# Patient Record
Sex: FEMALE | Race: WHITE | NOT HISPANIC OR LATINO | Employment: UNEMPLOYED | URBAN - METROPOLITAN AREA
[De-identification: names, ages, dates, MRNs, and addresses within clinical notes are randomized per-mention and may not be internally consistent; named-entity substitution may affect disease eponyms.]

---

## 2019-05-26 ENCOUNTER — HOSPITAL ENCOUNTER (EMERGENCY)
Facility: HOSPITAL | Age: 44
Discharge: HOME | End: 2019-05-27
Attending: EMERGENCY MEDICINE

## 2019-05-26 VITALS
WEIGHT: 130 LBS | OXYGEN SATURATION: 98 % | BODY MASS INDEX: 23.04 KG/M2 | RESPIRATION RATE: 16 BRPM | HEIGHT: 63 IN | TEMPERATURE: 98 F | SYSTOLIC BLOOD PRESSURE: 138 MMHG | HEART RATE: 94 BPM | DIASTOLIC BLOOD PRESSURE: 92 MMHG

## 2019-05-26 DIAGNOSIS — S92.421A CLOSED DISPLACED FRACTURE OF DISTAL PHALANX OF RIGHT GREAT TOE, INITIAL ENCOUNTER: Primary | ICD-10-CM

## 2019-05-26 DIAGNOSIS — T14.90XA TRAUMA: ICD-10-CM

## 2019-05-26 PROCEDURE — 99284 EMERGENCY DEPT VISIT MOD MDM: CPT

## 2019-05-27 RX ORDER — BUPIVACAINE HYDROCHLORIDE 2.5 MG/ML
10 INJECTION, SOLUTION EPIDURAL; INFILTRATION; INTRACAUDAL
Status: DISCONTINUED | OUTPATIENT
Start: 2019-05-27 | End: 2019-05-27 | Stop reason: HOSPADM

## 2019-05-27 RX ORDER — HYDROCODONE BITARTRATE AND ACETAMINOPHEN 10; 325 MG/1; MG/1
1 TABLET ORAL EVERY 4 HOURS PRN
Qty: 18 TABLET | Refills: 0 | Status: SHIPPED | OUTPATIENT
Start: 2019-05-27 | End: 2020-10-08 | Stop reason: SDUPTHER

## 2019-05-27 RX ORDER — CEPHALEXIN 500 MG/1
500 CAPSULE ORAL EVERY 6 HOURS
Qty: 28 CAPSULE | Refills: 0 | Status: SHIPPED | OUTPATIENT
Start: 2019-05-27 | End: 2019-06-03

## 2019-05-27 NOTE — ED PROVIDER NOTES
Encounter Date: 5/26/2019       History     Chief Complaint   Patient presents with    Toe Injury     Rt great toe pain and top of foot pain     A 44-year-old female complains of a sudden onset of right great toe pain after she was picked up and fell directly on the toe.  She complains of a sharp severe pain that is been constant for 2 hr since the accident.  Also complains of distal foot pain on the right-hand side.  She has been drinking alcohol this evening.        Review of patient's allergies indicates:  No Known Allergies  Past Medical History:   Diagnosis Date    Sciatic nerve pain      Past Surgical History:   Procedure Laterality Date    TUBAL LIGATION       History reviewed. No pertinent family history.  Social History     Tobacco Use    Smoking status: Current Every Day Smoker     Packs/day: 1.00    Smokeless tobacco: Never Used   Substance Use Topics    Alcohol use: No    Drug use: No     Review of Systems   Constitutional: Negative for activity change and appetite change.   HENT: Negative for rhinorrhea.    Respiratory: Negative for shortness of breath.    Cardiovascular: Negative for chest pain.   Musculoskeletal:        Positive for right great toe pain and foot pain.       Physical Exam     Initial Vitals [05/26/19 2319]   BP Pulse Resp Temp SpO2   (!) 138/92 94 16 98.1 °F (36.7 °C) 98 %      MAP       --         Physical Exam    Nursing note and vitals reviewed.  Constitutional: Vital signs are normal. She appears well-developed and well-nourished. She does not have a sickly appearance.   HENT:   Head: Normocephalic and atraumatic.   Eyes: Conjunctivae and EOM are normal.   Neck: Normal range of motion.   Musculoskeletal:   Patient has bleeding around the paronychia of the right great toe.  Appears to be intact.  There is no lacerations of the paronychia or the margins of the toe.  She does have tenderness to palpation of the distal 1st metatarsal on the right foot.  She has a good dorsalis  pedis pulse.  She is neurovascularly intact.   Neurological: She is alert and oriented to person, place, and time.   Skin: Skin is warm, dry and intact. Capillary refill takes less than 2 seconds.         ED Course   Procedures  Labs Reviewed - No data to display       Imaging Results          X-Ray Toe 2 or More Views Right (Final result)  Result time 05/27/19 09:35:21    Final result by Oscar Joyner MD (05/27/19 09:35:21)                 Impression:      There is a fracture of the great toe distal phalanx with a few mm distraction between fragments.      Electronically signed by: Oscar Joyner  Date:    05/27/2019  Time:    09:35             Narrative:    EXAMINATION:  XR TOE 2 OR MORE VIEWS RIGHT    CLINICAL HISTORY:  Rt Great toe;    TECHNIQUE:  Frontal oblique and lateral views of the right toes    COMPARISON:  None    FINDINGS:  There are bipartite medial and lateral hallux sesamoid, anatomic variant.  There is a transverse fracture along the great toe distal phalanx.  No intra-articular involvement.  On lateral view there is 2 mm of distraction between fragments.  Preserved joint spaces.  Soft tissue swelling about the great toe and medial forefoot.                               X-Ray Foot Complete Right (Final result)  Result time 05/27/19 09:33:06    Final result by Oscar Joyner MD (05/27/19 09:33:06)                 Impression:      Nondisplaced 1st distal phalangeal fracture.  This is considered open due to nail bed proximity.      Electronically signed by: Oscar Joyner  Date:    05/27/2019  Time:    09:33             Narrative:    EXAMINATION:  XR FOOT COMPLETE 3 VIEW RIGHT    CLINICAL HISTORY:  . Injury, unspecified, initial encounter    TECHNIQUE:  AP, lateral, and oblique views of the right foot were performed.    COMPARISON:  None    FINDINGS:  Bipartite medial and lateral hallux sesamoid, anatomic variants.  There is an accessory ossicle or less likely remote trauma at the base of  the 5th metatarsal.  There is a transverse nondisplaced fracture in the great toe distal phalanx without intra-articular involvement.  No malalignment.  Preserved joint spaces.                                 Medical Decision Making:   ED Management:  43 yo female with toe injury. xrays ordered. Signed out to Dr Power for followup of results.    This is the extent to the patients complaints today here in the emergency department.                      Clinical Impression:     1. Closed displaced fracture of distal phalanx of right great toe, initial encounter    2. Trauma                                  Jorge Pimentel,   05/28/19 0603

## 2020-04-06 ENCOUNTER — HOSPITAL ENCOUNTER (EMERGENCY)
Facility: HOSPITAL | Age: 45
Discharge: HOME | End: 2020-04-06
Attending: EMERGENCY MEDICINE
Payer: OTHER GOVERNMENT

## 2020-04-06 VITALS
OXYGEN SATURATION: 98 % | DIASTOLIC BLOOD PRESSURE: 93 MMHG | TEMPERATURE: 99 F | SYSTOLIC BLOOD PRESSURE: 151 MMHG | RESPIRATION RATE: 18 BRPM | HEART RATE: 67 BPM

## 2020-04-06 DIAGNOSIS — J02.9 VIRAL PHARYNGITIS: ICD-10-CM

## 2020-04-06 DIAGNOSIS — S60.221A CONTUSION OF RIGHT HAND, INITIAL ENCOUNTER: ICD-10-CM

## 2020-04-06 DIAGNOSIS — B34.9 VIRAL SYNDROME: Primary | ICD-10-CM

## 2020-04-06 LAB
GROUP A STREP, MOLECULAR: NEGATIVE
SARS-COV-2 RNA AMPLIFICATION, QUAL: NEGATIVE

## 2020-04-06 PROCEDURE — 99283 EMERGENCY DEPT VISIT LOW MDM: CPT | Mod: 25

## 2020-04-06 PROCEDURE — U0002 COVID-19 LAB TEST NON-CDC: HCPCS

## 2020-04-06 PROCEDURE — 87651 STREP A DNA AMP PROBE: CPT

## 2020-04-06 NOTE — ED PROVIDER NOTES
Encounter Date: 4/6/2020    SCRIBE #1 NOTE: I, Megan Paz, am scribing for, and in the presence of, Niurka Falcon PA-C.       History     Chief Complaint   Patient presents with    COVID-19 Concerns     possible exposure at work - sore throat       Time seen by provider: 10:51 AM on 04/06/2020    Socorro Willis is a 44 y.o. female who presents to the ED with the c/o a cough and sore throat x2 days. Patient states she was possibly exposed to COVID at work and would like to be tested. No fever, n/v/d, abdominal pain, CP or SOB. She also c/o right hand pain after becoming intoxicated and falling 2 nights ago. She smokes 1/5 ppd. No pulmonary PMHx or PSHx. NKDA.     The history is provided by the patient.     Review of patient's allergies indicates:  No Known Allergies  Past Medical History:   Diagnosis Date    Sciatic nerve pain      Past Surgical History:   Procedure Laterality Date    TUBAL LIGATION       No family history on file.  Social History     Tobacco Use    Smoking status: Current Every Day Smoker     Packs/day: 1.00    Smokeless tobacco: Never Used   Substance Use Topics    Alcohol use: No    Drug use: No     Review of Systems   Constitutional: Negative for activity change, appetite change, chills and fever.   HENT: Positive for sore throat. Negative for congestion and rhinorrhea.    Eyes: Negative for redness and visual disturbance.   Respiratory: Positive for cough. Negative for chest tightness and shortness of breath.    Cardiovascular: Negative for chest pain.   Gastrointestinal: Negative for abdominal pain, diarrhea, nausea and vomiting.   Genitourinary: Negative for dysuria and frequency.   Musculoskeletal: Positive for myalgias (right hand). Negative for back pain, neck pain and neck stiffness.   Skin: Negative for rash.   Neurological: Negative for dizziness, syncope, numbness and headaches.       Physical Exam     Initial Vitals [04/06/20 1042]   BP Pulse Resp Temp SpO2   (!) 151/93 67  18 98.6 °F (37 °C) 98 %      MAP       --         Physical Exam    Nursing note and vitals reviewed.  Constitutional: She appears well-developed and well-nourished. She is cooperative.  Non-toxic appearance. She does not have a sickly appearance.   HENT:   Head: Normocephalic and atraumatic.   Right Ear: Tympanic membrane and external ear normal.   Left Ear: Tympanic membrane and external ear normal.   Nose: Nose normal.   Mouth/Throat: Uvula is midline and oropharynx is clear and moist.   Eyes: Conjunctivae and lids are normal.   Neck: Normal range of motion and full passive range of motion without pain. Neck supple.   Cardiovascular: Normal rate, regular rhythm and normal heart sounds. Exam reveals no gallop and no friction rub.    No murmur heard.  Pulses:       Radial pulses are 2+ on the right side, and 2+ on the left side.   Pulmonary/Chest: Breath sounds normal. She has no wheezes. She has no rhonchi. She has no rales.   Abdominal: Normal appearance.   Musculoskeletal:        Right hand: She exhibits tenderness. She exhibits normal range of motion.   Tenderness and swelling to the right 5th MCP joint. Full ROM. No erythema. 5/5  strength in the right hand.    Neurological: She is alert.   Sensation in the right hand is intact.    Skin: Skin is warm, dry and intact. No rash noted.         ED Course   Procedures  Labs Reviewed   GROUP A STREP, MOLECULAR   SARS-COV-2 RNA AMPLIFICATION, QUAL          Imaging Results          X-Ray Hand 3 view Right (Final result)  Result time 04/06/20 11:37:18    Final result by Germania Grace MD (04/06/20 11:37:18)                 Impression:      As above      Electronically signed by: Germania Grace MD  Date:    04/06/2020  Time:    11:37             Narrative:    EXAMINATION:  XR HAND COMPLETE 3 VIEW RIGHT    CLINICAL HISTORY:  hand pain;    TECHNIQUE:  Three views right hand    COMPARISON:  None    FINDINGS:  No acute fracture or dislocation right hand.                                  Medical Decision Making:   History:   Old Medical Records: I decided to obtain old medical records.  Clinical Tests:   Lab Tests: Ordered and Reviewed  Radiological Study: Ordered and Reviewed       APC / Resident Notes:   Urgent evaluation of a well appearing 44 year old who presents with sore throat, cough and right hand pain. Vital signs reviewed. No tonsillar swelling or exudate noted.  Uvula is midline. I doubt strep. Breath sounds are clear and equal bilaterally. I doubt pneumonia. Rapid testing performed which was negative. She also reports pain and swelling to right hand after falling. She is neurovascularly intact. She has no erythema or concern for septic jiont. Recommend self quarantine. Symptomatic treatment. Discussed results with patient. Return precautions given. Patient is to follow up with their primary care provider.          Scribe Attestation:   Scribe #1: I performed the above scribed service and the documentation accurately describes the services I performed. I attest to the accuracy of the note.                   I, Niurka Falcon PA-C, personally performed the services described in this documentation. All medical record entries made by the scribe were at my direction and in my presence.  I have reviewed the chart and agree that the record reflects my personal performance and is accurate and complete. Niurka Falcon PA-C.  1:55 PM 04/06/2020          Clinical Impression:       ICD-10-CM ICD-9-CM   1. Viral syndrome B34.9 079.99   2. Contusion of right hand, initial encounter S60.221A 923.20   3. Viral pharyngitis J02.9 462         Disposition:   Disposition: Discharged  Condition: Stable     ED Disposition Condition    Discharge Stable        ED Prescriptions     None        Follow-up Information     Follow up With Specialties Details Why Contact Info    [address]   [place] [address]  [phone]      [facility]  Emergency Medicine  As needed [address] [place] [id number]  [phone]                                     Niurka Falcon PA-C  04/06/20 1400

## 2020-07-13 ENCOUNTER — HOSPITAL ENCOUNTER (EMERGENCY)
Facility: HOSPITAL | Age: 45
Discharge: HOME | End: 2020-07-13
Attending: EMERGENCY MEDICINE

## 2020-07-13 VITALS
WEIGHT: 135 LBS | SYSTOLIC BLOOD PRESSURE: 168 MMHG | TEMPERATURE: 98 F | HEART RATE: 72 BPM | HEIGHT: 63 IN | RESPIRATION RATE: 16 BRPM | OXYGEN SATURATION: 99 % | DIASTOLIC BLOOD PRESSURE: 88 MMHG | BODY MASS INDEX: 23.92 KG/M2

## 2020-07-13 DIAGNOSIS — K02.9 DENTAL CARIES: Primary | ICD-10-CM

## 2020-07-13 DIAGNOSIS — K04.7 DENTAL ABSCESS: ICD-10-CM

## 2020-07-13 LAB
B-HCG UR QL: NEGATIVE
CTP QC/QA: YES

## 2020-07-13 PROCEDURE — 99284 EMERGENCY DEPT VISIT MOD MDM: CPT | Mod: 25

## 2020-07-13 PROCEDURE — 25000003 PHARM REV CODE 250: Performed by: NURSE PRACTITIONER

## 2020-07-13 PROCEDURE — 96372 THER/PROPH/DIAG INJ SC/IM: CPT | Mod: 59

## 2020-07-13 PROCEDURE — 81025 URINE PREGNANCY TEST: CPT | Performed by: NURSE PRACTITIONER

## 2020-07-13 RX ORDER — CLINDAMYCIN HYDROCHLORIDE 300 MG/1
300 CAPSULE ORAL 4 TIMES DAILY
Qty: 40 CAPSULE | Refills: 0 | Status: SHIPPED | OUTPATIENT
Start: 2020-07-13 | End: 2020-07-23

## 2020-07-13 RX ORDER — DICLOFENAC SODIUM 50 MG/1
50 TABLET, DELAYED RELEASE ORAL 3 TIMES DAILY PRN
Qty: 20 TABLET | Refills: 2 | Status: ON HOLD | OUTPATIENT
Start: 2020-07-13 | End: 2022-11-08 | Stop reason: HOSPADM

## 2020-07-13 RX ORDER — CLINDAMYCIN PHOSPHATE 150 MG/ML
600 INJECTION, SOLUTION INTRAVENOUS
Status: COMPLETED | OUTPATIENT
Start: 2020-07-13 | End: 2020-07-13

## 2020-07-13 RX ADMIN — CLINDAMYCIN PHOSPHATE 600 MG: 150 INJECTION, SOLUTION INTRAMUSCULAR; INTRAVENOUS at 04:07

## 2020-07-13 NOTE — Clinical Note
Socorro Willis was seen and treated in our emergency department on 7/13/2020.  She may return to work on 07/16/2020.       If you have any questions or concerns, please don't hesitate to call.      Davina Hargrove NP

## 2020-07-13 NOTE — ED PROVIDER NOTES
Encounter Date: 7/13/2020       History     Chief Complaint   Patient presents with    Oral Swelling     right upper     Presents with complaint of dental pain Onset 2 days  Mild facial swelling right side        Review of patient's allergies indicates:  No Known Allergies  Past Medical History:   Diagnosis Date    Sciatic nerve pain      Past Surgical History:   Procedure Laterality Date    TUBAL LIGATION       No family history on file.  Social History     Tobacco Use    Smoking status: Current Every Day Smoker     Packs/day: 1.00    Smokeless tobacco: Never Used   Substance Use Topics    Alcohol use: No    Drug use: No     Review of Systems   Constitutional: Negative for fever.   HENT: Positive for dental problem. Negative for trouble swallowing.    Respiratory: Negative for cough, shortness of breath and wheezing.    Cardiovascular: Negative for chest pain, palpitations and leg swelling.   Gastrointestinal: Negative for abdominal pain, diarrhea, nausea and vomiting.   Genitourinary: Negative for dysuria.   Musculoskeletal: Negative for back pain.   Skin: Negative for rash.   Neurological: Negative for weakness.       Physical Exam     Initial Vitals [07/13/20 1642]   BP Pulse Resp Temp SpO2   (!) 170/89 77 16 98 °F (36.7 °C) 99 %      MAP       --         Physical Exam    Constitutional: She appears well-developed and well-nourished.   HENT:   Head: Normocephalic and atraumatic.   Mouth/Throat: Oropharynx is clear and moist.   Mild right side facial swelling  Severe dental caries.  Moderate gum swelling right upper gums   Eyes: Conjunctivae are normal.   Neck: Normal range of motion. Neck supple.   Cardiovascular: Normal rate and regular rhythm.   Pulmonary/Chest: Breath sounds normal. No respiratory distress. She has no wheezes. She has no rhonchi.   Musculoskeletal: Normal range of motion.   Neurological: She is alert and oriented to person, place, and time. No sensory deficit. GCS score is 15. GCS eye  subscore is 4. GCS verbal subscore is 5. GCS motor subscore is 6.   Skin: Skin is warm and dry. Capillary refill takes less than 2 seconds.   Psychiatric: She has a normal mood and affect. Thought content normal.         ED Course   Procedures  Labs Reviewed - No data to display       Imaging Results    None          Medical Decision Making:   Initial Assessment:   Dental pain  Have discussed this pt with Dr. Hughes                   ED Course as of Jul 13 1648   Mon Jul 13, 2020   1631 Dental pain Onset 2 days    [KN]      ED Course User Index  [KN] Davina Hargrove NP                Clinical Impression:       ICD-10-CM ICD-9-CM   1. Dental caries  K02.9 521.00   2. Dental abscess  K04.7 522.5                                Davina Hargrove NP  07/13/20 1656       Davina Hargrove NP  07/13/20 1703       Davina Hargrove NP  07/13/20 1824

## 2020-07-13 NOTE — Clinical Note
Socorro Willis was seen and treated in our emergency department on 7/13/2020.  She may return to work on 07/16/2020.       If you have any questions or concerns, please don't hesitate to call.      Hadley Reid MD

## 2020-10-08 ENCOUNTER — HOSPITAL ENCOUNTER (EMERGENCY)
Facility: HOSPITAL | Age: 45
Discharge: HOME | End: 2020-10-08
Attending: EMERGENCY MEDICINE

## 2020-10-08 VITALS
BODY MASS INDEX: 23.91 KG/M2 | TEMPERATURE: 98 F | DIASTOLIC BLOOD PRESSURE: 72 MMHG | RESPIRATION RATE: 16 BRPM | WEIGHT: 135 LBS | OXYGEN SATURATION: 98 % | SYSTOLIC BLOOD PRESSURE: 136 MMHG | HEART RATE: 77 BPM

## 2020-10-08 DIAGNOSIS — S62.645A CLOSED NONDISPLACED FRACTURE OF PROXIMAL PHALANX OF LEFT RING FINGER, INITIAL ENCOUNTER: Primary | ICD-10-CM

## 2020-10-08 LAB
B-HCG UR QL: NEGATIVE
CTP QC/QA: YES

## 2020-10-08 PROCEDURE — 99284 EMERGENCY DEPT VISIT MOD MDM: CPT | Mod: 25

## 2020-10-08 PROCEDURE — 29125 APPL SHORT ARM SPLINT STATIC: CPT | Mod: LT

## 2020-10-08 PROCEDURE — 81025 URINE PREGNANCY TEST: CPT | Performed by: EMERGENCY MEDICINE

## 2020-10-08 PROCEDURE — 63600175 PHARM REV CODE 636 W HCPCS: Performed by: EMERGENCY MEDICINE

## 2020-10-08 PROCEDURE — 25000003 PHARM REV CODE 250: Performed by: EMERGENCY MEDICINE

## 2020-10-08 PROCEDURE — 96372 THER/PROPH/DIAG INJ SC/IM: CPT

## 2020-10-08 RX ORDER — HYDROCODONE BITARTRATE AND ACETAMINOPHEN 10; 325 MG/1; MG/1
1 TABLET ORAL EVERY 6 HOURS PRN
Qty: 12 TABLET | Refills: 0 | Status: SHIPPED | OUTPATIENT
Start: 2020-10-08 | End: 2020-10-11

## 2020-10-08 RX ORDER — ORPHENADRINE CITRATE 100 MG/1
100 TABLET, EXTENDED RELEASE ORAL
Status: COMPLETED | OUTPATIENT
Start: 2020-10-08 | End: 2020-10-08

## 2020-10-08 RX ORDER — KETOROLAC TROMETHAMINE 30 MG/ML
30 INJECTION, SOLUTION INTRAMUSCULAR; INTRAVENOUS
Status: COMPLETED | OUTPATIENT
Start: 2020-10-08 | End: 2020-10-08

## 2020-10-08 RX ADMIN — KETOROLAC TROMETHAMINE 30 MG: 30 INJECTION, SOLUTION INTRAMUSCULAR at 03:10

## 2020-10-08 RX ADMIN — ORPHENADRINE CITRATE 100 MG: 100 TABLET, EXTENDED RELEASE ORAL at 03:10

## 2020-10-08 NOTE — Clinical Note
"Socorro Cruz"Lazaro was seen and treated in our emergency department on 10/8/2020.  She may return to work on 10/13/2020.       If you have any questions or concerns, please don't hesitate to call.      ДМИТРИЙ Sterling LPN"

## 2020-10-09 NOTE — ED PROVIDER NOTES
Encounter Date: 10/8/2020       History     Chief Complaint   Patient presents with    Hand Injury     Pt states she tripped and fell onto left hand; presents with pain and swelling     This patient is a 45-year-old female with a past history of tubal ligation, sciatic nerve pain, previous finger fracture presenting with complaints of pain in the left hand after a trip and fall yesterday.  She reports pain and swelling primarily localized to the area of the proximal middle and ring finger.  She denies change in strength or sensation not inhibited by pain.  She denies open wounds.  She reports taking over-the-counter pain medication yesterday prior to arrival.         Review of patient's allergies indicates:  No Known Allergies  Past Medical History:   Diagnosis Date    Sciatic nerve pain      Past Surgical History:   Procedure Laterality Date    TUBAL LIGATION       History reviewed. No pertinent family history.  Social History     Tobacco Use    Smoking status: Current Every Day Smoker     Packs/day: 1.00    Smokeless tobacco: Never Used   Substance Use Topics    Alcohol use: No    Drug use: No     Review of Systems   Musculoskeletal: Positive for arthralgias and myalgias.   Skin: Negative for wound.   Neurological: Negative for weakness.       Physical Exam     Initial Vitals [10/08/20 1448]   BP Pulse Resp Temp SpO2   136/72 77 16 98 °F (36.7 °C) 98 %      MAP       --         Physical Exam    Nursing note and vitals reviewed.  Constitutional: She appears well-developed and well-nourished. No distress.   HENT:   Head: Normocephalic and atraumatic.   Eyes: Conjunctivae and EOM are normal.   Neck: Normal range of motion.   Musculoskeletal: Tenderness and edema present.      Comments: Tenderness and swelling localized to the proximal 3rd and 4th left digit, no open wounds, soft finger and hand compartments.  Capillary refill less than 3 sec, no duran wrist or proximal arm involvement.  There is ecchymosis on  the palmar aspect of the hand distally.  Full range of motion inhibited by pain.   Neurological: She is alert and oriented to person, place, and time. She has normal strength. No sensory deficit. GCS score is 15. GCS eye subscore is 4. GCS verbal subscore is 5. GCS motor subscore is 6.   Skin: Skin is warm and dry.   Psychiatric: She has a normal mood and affect. Thought content normal.         ED Course   Splint Application    Date/Time: 10/9/2020 11:57 AM  Performed by: Jorge Power MD  Authorized by: Jorge Power MD   Consent Done: Not Needed  Location details: left hand  Splint type: volar short arm (With extension to the finger tips)  Supplies used: cotton padding,  elastic bandage and Ortho-Glass  Post-procedure: The splinted body part was neurovascularly unchanged following the procedure.  Patient tolerance: Patient tolerated the procedure well with no immediate complications        Labs Reviewed   POCT URINE PREGNANCY          Imaging Results          X-Ray Hand 3 view Left (Final result)  Result time 10/08/20 15:05:54    Final result by Kourtney Pierre MD (10/08/20 15:05:54)                 Impression:      There is no bleed clear oriented fracture of the proximal aspect of the proximal phalanx of the ring finger.      Electronically signed by: Kourtney Pierre MD  Date:    10/08/2020  Time:    15:05             Narrative:    EXAMINATION:  XR HAND COMPLETE 3 VIEW LEFT    CLINICAL HISTORY:  fall from stumbling;.    TECHNIQUE:  PA, lateral, and oblique views of the left hand were performed.    COMPARISON:  None    FINDINGS:  Is joint space narrowing of DIP and PIP joints.    There is an obliquely oriented fracture of the proximal aspect of the proximal phalanx ring finger.  No other fractures are identified.                                 Medical Decision Making:   ED Management:  This patient was interviewed and assessed emergently.  She has neurovascularly intact complaint fingers and hand.  No  open wounds.  Radiographs indicate an obliquely oriented fracture of the proximal aspect of the proximal phalanx of the ring finger without significant displacement.  Patient is provided pain medication and here will be discharged with a course of this.  She is provided a volar short-arm splint that extends to the finger tips, leaving the digits in neutral position.  She is instructed to keep this on until evaluated by Orthopedic Services or hand surgery.  She is asked to ice and elevate the affected area.  She is asked return to the ER immediately for any new, concerning, or worsening symptoms including pain out of proportion, change in temperature or or discoloration of the fingers.  Patient is agreeable with this plan and discharged in stable condition.                             Clinical Impression:       ICD-10-CM ICD-9-CM   1. Closed nondisplaced fracture of proximal phalanx of left ring finger, initial encounter  S62.645A 816.01                      Disposition:   Disposition: Discharged  Condition: Stable     ED Disposition Condition    Discharge Stable        ED Prescriptions     Medication Sig Dispense Start Date End Date Auth. Provider    HYDROcodone-acetaminophen (NORCO)  mg per tablet Take 1 tablet by mouth every 6 (six) hours as needed for Pain. 12 tablet 10/8/2020 10/11/2020 Jorge Power MD        Follow-up Information     Follow up With Specialties Details Why Contact Info    Morgan Mcconnell MD Orthopedic Surgery   [address] [place] [address] ORTHOPEDICS & SPORTS MEDICINE  [facility] [address]  [phone]      [facility] Surgical Oncology, Orthopedic Surgery, Genetics, Physical Medicine and Rehabilitation, Occupational Therapy, Radiology   [address] [facility] [address]  [phone]                                         Jorge Power MD  10/09/20 2883

## 2022-11-05 ENCOUNTER — HOSPITAL ENCOUNTER (INPATIENT)
Facility: HOSPITAL | Age: 47
LOS: 3 days | Discharge: HOME | DRG: 482 | End: 2022-11-08
Attending: EMERGENCY MEDICINE | Admitting: STUDENT IN AN ORGANIZED HEALTH CARE EDUCATION/TRAINING PROGRAM
Payer: MEDICAID

## 2022-11-05 ENCOUNTER — HOSPITAL ENCOUNTER (EMERGENCY)
Facility: HOSPITAL | Age: 47
Discharge: ANOTHER HOSPITAL | End: 2022-11-05
Attending: EMERGENCY MEDICINE
Payer: MEDICAID

## 2022-11-05 VITALS
BODY MASS INDEX: 31.01 KG/M2 | OXYGEN SATURATION: 95 % | WEIGHT: 175 LBS | HEART RATE: 82 BPM | HEIGHT: 63 IN | SYSTOLIC BLOOD PRESSURE: 172 MMHG | RESPIRATION RATE: 20 BRPM | DIASTOLIC BLOOD PRESSURE: 86 MMHG | TEMPERATURE: 99 F

## 2022-11-05 DIAGNOSIS — R60.9 SWELLING: ICD-10-CM

## 2022-11-05 DIAGNOSIS — S72.002A CLOSED FRACTURE OF LEFT HIP, INITIAL ENCOUNTER: Primary | ICD-10-CM

## 2022-11-05 DIAGNOSIS — R52 PAIN AGGRAVATED BY ACTIVITIES OF DAILY LIVING: ICD-10-CM

## 2022-11-05 DIAGNOSIS — R07.9 CHEST PAIN: ICD-10-CM

## 2022-11-05 DIAGNOSIS — S72.002A CLOSED FRACTURE OF NECK OF LEFT FEMUR, INITIAL ENCOUNTER: Primary | ICD-10-CM

## 2022-11-05 DIAGNOSIS — S72.009A HIP FRACTURE: ICD-10-CM

## 2022-11-05 PROBLEM — K02.9 DENTAL CARIES: Status: RESOLVED | Noted: 2020-07-13 | Resolved: 2022-11-05

## 2022-11-05 PROBLEM — E66.9 OBESITY (BMI 30.0-34.9): Status: ACTIVE | Noted: 2022-11-05

## 2022-11-05 PROBLEM — F17.200 CURRENT EVERY DAY SMOKER: Status: ACTIVE | Noted: 2022-11-05

## 2022-11-05 PROBLEM — K04.7 DENTAL ABSCESS: Status: RESOLVED | Noted: 2020-07-13 | Resolved: 2022-11-05

## 2022-11-05 PROBLEM — F10.20 ALCOHOL USE DISORDER, MODERATE, DEPENDENCE: Status: ACTIVE | Noted: 2022-11-05

## 2022-11-05 PROBLEM — E66.811 OBESITY (BMI 30.0-34.9): Status: ACTIVE | Noted: 2022-11-05

## 2022-11-05 LAB
ALBUMIN SERPL BCP-MCNC: 3.8 G/DL (ref 3.5–5.2)
ALP SERPL-CCNC: 121 U/L (ref 55–135)
ALT SERPL W/O P-5'-P-CCNC: 22 U/L (ref 10–44)
ANION GAP SERPL CALC-SCNC: 8 MMOL/L (ref 8–16)
AST SERPL-CCNC: 25 U/L (ref 10–40)
BASOPHILS # BLD AUTO: 0.03 K/UL (ref 0–0.2)
BASOPHILS NFR BLD: 0.3 % (ref 0–1.9)
BILIRUB SERPL-MCNC: 0.7 MG/DL (ref 0.1–1)
BILIRUB UR QL STRIP: NEGATIVE
BUN SERPL-MCNC: 8 MG/DL (ref 6–20)
CALCIUM SERPL-MCNC: 9 MG/DL (ref 8.7–10.5)
CHLORIDE SERPL-SCNC: 98 MMOL/L (ref 95–110)
CLARITY UR: ABNORMAL
CO2 SERPL-SCNC: 28 MMOL/L (ref 23–29)
COLOR UR: YELLOW
CREAT SERPL-MCNC: 0.6 MG/DL (ref 0.5–1.4)
DIFFERENTIAL METHOD: ABNORMAL
EOSINOPHIL # BLD AUTO: 0 K/UL (ref 0–0.5)
EOSINOPHIL NFR BLD: 0.2 % (ref 0–8)
ERYTHROCYTE [DISTWIDTH] IN BLOOD BY AUTOMATED COUNT: 14.5 % (ref 11.5–14.5)
EST. GFR  (NO RACE VARIABLE): >60 ML/MIN/1.73 M^2
ETHANOL SERPL-MCNC: <10 MG/DL
GLUCOSE SERPL-MCNC: 113 MG/DL (ref 70–110)
GLUCOSE UR QL STRIP: NEGATIVE
HCT VFR BLD AUTO: 42.1 % (ref 37–48.5)
HGB BLD-MCNC: 13.9 G/DL (ref 12–16)
HGB UR QL STRIP: NEGATIVE
IMM GRANULOCYTES # BLD AUTO: 0.05 K/UL (ref 0–0.04)
IMM GRANULOCYTES NFR BLD AUTO: 0.4 % (ref 0–0.5)
INR PPP: 1
KETONES UR QL STRIP: NEGATIVE
LEUKOCYTE ESTERASE UR QL STRIP: NEGATIVE
LYMPHOCYTES # BLD AUTO: 1.3 K/UL (ref 1–4.8)
LYMPHOCYTES NFR BLD: 11.5 % (ref 18–48)
MCH RBC QN AUTO: 31.6 PG (ref 27–31)
MCHC RBC AUTO-ENTMCNC: 33 G/DL (ref 32–36)
MCV RBC AUTO: 96 FL (ref 82–98)
MONOCYTES # BLD AUTO: 1.1 K/UL (ref 0.3–1)
MONOCYTES NFR BLD: 9.8 % (ref 4–15)
NEUTROPHILS # BLD AUTO: 9.1 K/UL (ref 1.8–7.7)
NEUTROPHILS NFR BLD: 77.8 % (ref 38–73)
NITRITE UR QL STRIP: NEGATIVE
NRBC BLD-RTO: 0 /100 WBC
PH UR STRIP: 8 [PH] (ref 5–8)
PLATELET # BLD AUTO: 210 K/UL (ref 150–450)
PMV BLD AUTO: 10.5 FL (ref 9.2–12.9)
POTASSIUM SERPL-SCNC: 3.6 MMOL/L (ref 3.5–5.1)
PROT SERPL-MCNC: 7.7 G/DL (ref 6–8.4)
PROT UR QL STRIP: NEGATIVE
PROTHROMBIN TIME: 12.5 SEC (ref 11.4–13.7)
RBC # BLD AUTO: 4.4 M/UL (ref 4–5.4)
SODIUM SERPL-SCNC: 134 MMOL/L (ref 136–145)
SP GR UR STRIP: 1.01 (ref 1–1.03)
URN SPEC COLLECT METH UR: ABNORMAL
UROBILINOGEN UR STRIP-ACNC: NEGATIVE EU/DL
WBC # BLD AUTO: 11.69 K/UL (ref 3.9–12.7)

## 2022-11-05 PROCEDURE — 99285 EMERGENCY DEPT VISIT HI MDM: CPT | Mod: 25

## 2022-11-05 PROCEDURE — 85025 COMPLETE CBC W/AUTO DIFF WBC: CPT | Performed by: EMERGENCY MEDICINE

## 2022-11-05 PROCEDURE — 85610 PROTHROMBIN TIME: CPT | Performed by: EMERGENCY MEDICINE

## 2022-11-05 PROCEDURE — 96374 THER/PROPH/DIAG INJ IV PUSH: CPT

## 2022-11-05 PROCEDURE — 25000003 PHARM REV CODE 250: Performed by: STUDENT IN AN ORGANIZED HEALTH CARE EDUCATION/TRAINING PROGRAM

## 2022-11-05 PROCEDURE — 93005 ELECTROCARDIOGRAM TRACING: CPT | Performed by: INTERNAL MEDICINE

## 2022-11-05 PROCEDURE — 80053 COMPREHEN METABOLIC PANEL: CPT | Performed by: EMERGENCY MEDICINE

## 2022-11-05 PROCEDURE — 36415 COLL VENOUS BLD VENIPUNCTURE: CPT | Performed by: STUDENT IN AN ORGANIZED HEALTH CARE EDUCATION/TRAINING PROGRAM

## 2022-11-05 PROCEDURE — 81003 URINALYSIS AUTO W/O SCOPE: CPT | Performed by: EMERGENCY MEDICINE

## 2022-11-05 PROCEDURE — 93010 EKG 12-LEAD: ICD-10-PCS | Mod: ,,, | Performed by: INTERNAL MEDICINE

## 2022-11-05 PROCEDURE — 63600175 PHARM REV CODE 636 W HCPCS: Performed by: EMERGENCY MEDICINE

## 2022-11-05 PROCEDURE — 63600175 PHARM REV CODE 636 W HCPCS: Performed by: STUDENT IN AN ORGANIZED HEALTH CARE EDUCATION/TRAINING PROGRAM

## 2022-11-05 PROCEDURE — 93010 ELECTROCARDIOGRAM REPORT: CPT | Mod: ,,, | Performed by: INTERNAL MEDICINE

## 2022-11-05 PROCEDURE — 12000002 HC ACUTE/MED SURGE SEMI-PRIVATE ROOM

## 2022-11-05 PROCEDURE — 82077 ASSAY SPEC XCP UR&BREATH IA: CPT | Performed by: STUDENT IN AN ORGANIZED HEALTH CARE EDUCATION/TRAINING PROGRAM

## 2022-11-05 RX ORDER — ACETAMINOPHEN 325 MG/1
650 TABLET ORAL EVERY 8 HOURS PRN
Status: DISCONTINUED | OUTPATIENT
Start: 2022-11-05 | End: 2022-11-08 | Stop reason: HOSPADM

## 2022-11-05 RX ORDER — HYDROMORPHONE HYDROCHLORIDE 1 MG/ML
0.5 INJECTION, SOLUTION INTRAMUSCULAR; INTRAVENOUS; SUBCUTANEOUS
Status: COMPLETED | OUTPATIENT
Start: 2022-11-05 | End: 2022-11-05

## 2022-11-05 RX ORDER — SODIUM CHLORIDE 0.9 % (FLUSH) 0.9 %
10 SYRINGE (ML) INJECTION
Status: DISCONTINUED | OUTPATIENT
Start: 2022-11-05 | End: 2022-11-08 | Stop reason: HOSPADM

## 2022-11-05 RX ORDER — ONDANSETRON 2 MG/ML
4 INJECTION INTRAMUSCULAR; INTRAVENOUS EVERY 8 HOURS PRN
Status: DISCONTINUED | OUTPATIENT
Start: 2022-11-05 | End: 2022-11-08 | Stop reason: HOSPADM

## 2022-11-05 RX ORDER — FOLIC ACID 1 MG/1
1 TABLET ORAL DAILY
Status: DISCONTINUED | OUTPATIENT
Start: 2022-11-06 | End: 2022-11-08 | Stop reason: HOSPADM

## 2022-11-05 RX ORDER — IBUPROFEN 200 MG
1 TABLET ORAL DAILY
Status: DISCONTINUED | OUTPATIENT
Start: 2022-11-06 | End: 2022-11-08 | Stop reason: HOSPADM

## 2022-11-05 RX ORDER — KETOROLAC TROMETHAMINE 30 MG/ML
15 INJECTION, SOLUTION INTRAMUSCULAR; INTRAVENOUS EVERY 6 HOURS PRN
Status: DISCONTINUED | OUTPATIENT
Start: 2022-11-05 | End: 2022-11-08 | Stop reason: HOSPADM

## 2022-11-05 RX ORDER — THIAMINE HCL 100 MG
100 TABLET ORAL DAILY
Status: DISCONTINUED | OUTPATIENT
Start: 2022-11-06 | End: 2022-11-08 | Stop reason: HOSPADM

## 2022-11-05 RX ORDER — SODIUM CHLORIDE 9 MG/ML
INJECTION, SOLUTION INTRAVENOUS CONTINUOUS
Status: DISCONTINUED | OUTPATIENT
Start: 2022-11-05 | End: 2022-11-06

## 2022-11-05 RX ORDER — NALOXONE HCL 0.4 MG/ML
0.02 VIAL (ML) INJECTION
Status: DISCONTINUED | OUTPATIENT
Start: 2022-11-05 | End: 2022-11-08 | Stop reason: HOSPADM

## 2022-11-05 RX ORDER — LORAZEPAM 2 MG/ML
1 INJECTION INTRAMUSCULAR EVERY 4 HOURS PRN
Status: DISCONTINUED | OUTPATIENT
Start: 2022-11-05 | End: 2022-11-08 | Stop reason: HOSPADM

## 2022-11-05 RX ORDER — HYDROCODONE BITARTRATE AND ACETAMINOPHEN 5; 325 MG/1; MG/1
1 TABLET ORAL EVERY 4 HOURS PRN
Status: DISCONTINUED | OUTPATIENT
Start: 2022-11-05 | End: 2022-11-08 | Stop reason: HOSPADM

## 2022-11-05 RX ORDER — MUPIROCIN 20 MG/G
OINTMENT TOPICAL 2 TIMES DAILY
Status: DISCONTINUED | OUTPATIENT
Start: 2022-11-05 | End: 2022-11-08 | Stop reason: HOSPADM

## 2022-11-05 RX ORDER — MORPHINE SULFATE 4 MG/ML
4 INJECTION, SOLUTION INTRAMUSCULAR; INTRAVENOUS EVERY 4 HOURS PRN
Status: DISCONTINUED | OUTPATIENT
Start: 2022-11-05 | End: 2022-11-08 | Stop reason: HOSPADM

## 2022-11-05 RX ADMIN — HYDROMORPHONE HYDROCHLORIDE 0.5 MG: 0.5 INJECTION, SOLUTION INTRAMUSCULAR; INTRAVENOUS; SUBCUTANEOUS at 02:11

## 2022-11-05 RX ADMIN — SODIUM CHLORIDE: 0.9 INJECTION, SOLUTION INTRAVENOUS at 07:11

## 2022-11-05 RX ADMIN — MORPHINE SULFATE 4 MG: 4 INJECTION INTRAVENOUS at 07:11

## 2022-11-05 RX ADMIN — MUPIROCIN: 20 OINTMENT TOPICAL at 08:11

## 2022-11-05 NOTE — PROGRESS NOTES
Full consult to follow. Pt may eat today. NPO at midnight. Plan for IM nailing of left hip fracture delvin morning about 9:30.

## 2022-11-05 NOTE — ED PROVIDER NOTES
Encounter Date: 11/5/2022       History     Chief Complaint   Patient presents with    Leg Pain     LEFT    Groin Pain     LEFT SIDE     47-year-old female presents with left hip pain patient reports that pain started 3-4 days ago patient reports that yesterday her leg gave out on her and she reports it will not bear weight.  Patient rates pain as 8/10 and describes it as sharp worsened with movement partially alleviated by rest.  Patient has no other injuries or complaints at this time.    Review of patient's allergies indicates:  No Known Allergies  Past Medical History:   Diagnosis Date    Back injury     Sciatic nerve pain      Past Surgical History:   Procedure Laterality Date    TUBAL LIGATION       Family History   Problem Relation Age of Onset    Heart disease Father      Social History     Tobacco Use    Smoking status: Every Day     Packs/day: 1.00     Types: Cigarettes    Smokeless tobacco: Never   Substance Use Topics    Alcohol use: Yes     Alcohol/week: 14.0 standard drinks     Types: 14 Cans of beer per week    Drug use: Not Currently     Review of Systems   Constitutional:  Negative for fever.   HENT:  Negative for congestion, rhinorrhea, sore throat and trouble swallowing.    Eyes:  Negative for visual disturbance.   Respiratory:  Negative for cough, chest tightness, shortness of breath and wheezing.    Cardiovascular:  Negative for chest pain, palpitations and leg swelling.   Gastrointestinal:  Negative for abdominal distention, abdominal pain, constipation, diarrhea, nausea and vomiting.   Genitourinary:  Negative for difficulty urinating, dysuria, flank pain and frequency.   Musculoskeletal:  Positive for arthralgias. Negative for back pain, joint swelling and neck pain.   Skin:  Negative for color change and rash.   Neurological:  Negative for dizziness, syncope, speech difficulty, weakness, numbness and headaches.   All other systems reviewed and are negative.    Physical Exam     Initial  Vitals [11/05/22 1242]   BP Pulse Resp Temp SpO2   (!) 174/90 110 18 98.6 °F (37 °C) 99 %      MAP       --         Physical Exam    Nursing note and vitals reviewed.  Constitutional: She appears well-developed and well-nourished. She is not diaphoretic. No distress.   HENT:   Head: Normocephalic and atraumatic.   Right Ear: External ear normal.   Left Ear: External ear normal.   Nose: Nose normal.   Mouth/Throat: Oropharynx is clear and moist. No oropharyngeal exudate.   Eyes: Conjunctivae and EOM are normal. Pupils are equal, round, and reactive to light. Right eye exhibits no discharge. Left eye exhibits no discharge. No scleral icterus.   Neck: Neck supple. No thyromegaly present. No tracheal deviation present. No JVD present.   Normal range of motion.  Cardiovascular:  Normal rate, regular rhythm, normal heart sounds and intact distal pulses.     Exam reveals no gallop and no friction rub.       No murmur heard.  Pulmonary/Chest: Breath sounds normal. No stridor. No respiratory distress. She has no wheezes. She has no rhonchi. She has no rales. She exhibits no tenderness.   Abdominal: Abdomen is soft. Bowel sounds are normal. She exhibits no distension and no mass. There is no abdominal tenderness. There is no rebound and no guarding.   Musculoskeletal:         General: Tenderness present. No edema. Normal range of motion.      Cervical back: Normal range of motion and neck supple.      Comments: Tenderness to palpation to the left hip, full range of motion     Lymphadenopathy:     She has no cervical adenopathy.   Neurological: She is alert and oriented to person, place, and time. She has normal strength. No cranial nerve deficit or sensory deficit.   Skin: Skin is warm and dry. No rash and no abscess noted. No erythema. No pallor.       ED Course   Procedures  Labs Reviewed   CBC W/ AUTO DIFFERENTIAL - Abnormal; Notable for the following components:       Result Value    MCH 31.6 (*)     Gran # (ANC) 9.1 (*)      Immature Grans (Abs) 0.05 (*)     Mono # 1.1 (*)     Gran % 77.8 (*)     Lymph % 11.5 (*)     All other components within normal limits   COMPREHENSIVE METABOLIC PANEL - Abnormal; Notable for the following components:    Sodium 134 (*)     Glucose 113 (*)     All other components within normal limits   URINALYSIS - Abnormal; Notable for the following components:    Appearance, UA Hazy (*)     All other components within normal limits    Narrative:     Collection Type->Urine, Clean Catch   PROTIME-INR        ECG Results              EKG 12-lead (In process)  Result time 11/05/22 14:45:54      In process by Interface, Lab In [facility] (11/05/22 14:45:54)                   Narrative:    Test Reason : S72.009A,    Vent. Rate : 084 BPM     Atrial Rate : 084 BPM     P-R Int : 160 ms          QRS Dur : 074 ms      QT Int : 354 ms       P-R-T Axes : 030 040 023 degrees     QTc Int : 418 ms    Normal sinus rhythm  Normal ECG  No previous ECGs available    Referred By: [email or web address]   SELF           Confirmed By:                                   Imaging Results              US Lower Extremity Veins Left (Final result)  Result time 11/05/22 14:25:47      Final result by Mango Lambert MD (11/05/22 14:25:47)                   Narrative:    REASON: Swelling.    FINDINGS:    Grayscale, color and spectral Doppler analysis of the left lower extremity deep venous system was performed.    There is normal compressibility, color and spectral Doppler analysis, and augmentation in the left lower extremity deep venous system.    IMPRESSION:    No DVT of the left lower extremity veins.    Electronically signed by:  Mango Lambert DO  11/5/2022 2:25 PM CDT Workstation: [id number]                                     X-Ray Chest AP Portable (Final result)  Result time 11/05/22 14:11:49      Final result by Mango Lambert MD (11/05/22 14:11:49)                   Narrative:    Reason: fall    FINDINGS:    Portable chest without comparisons  show normal cardiomediastinal silhouette.  Lungs are clear. Pulmonary vasculature is normal. No acute osseous abnormality.    IMPRESSION:    No acute cardiopulmonary abnormality.    Electronically signed by:  Mango Lambert DO  11/5/2022 2:11 PM CDT Workstation: [id number]                                     X-Ray Hip 2 or 3 views Left (with Pelvis when performed) (Final result)  Result time 11/05/22 14:11:24      Final result by Mango Lambert MD (11/05/22 14:11:24)                   Narrative:    Reason: Pain status post fall.    FINDINGS:    AP pelvis with two views of the left hip acute basicervical fracture of the femoral neck demonstrated with mild varus angulation. Fracture appears comminuted. No dislocation. Soft tissues are unremarkable.    IMPRESSION:    Acute comminuted basicervical fracture of the femoral neck with mild varus angulation.    Electronically signed by:  Mango Lambert DO  11/5/2022 2:11 PM CDT Workstation: [id number]                                     Medications   HYDROmorphone injection 0.5 mg (0.5 mg Intravenous Given 11/5/22 1458)     Medical Decision Making:   History:   Old Medical Records: I decided to obtain old medical records.  Initial Assessment:   Emergent evaluation of a 47-year-old female presenting with left hip fracture patient consulted to Internal Medicine with Orthopedics follow    Case discussed with Orthopedics they recommend transfer to [facility] because he has cases scheduled there.                        Clinical Impression:   Final diagnoses:  [R52] Pain aggravated by activities of daily living  [R60.9] Swelling  [S72.009A] Hip fracture  [S72.002A] Closed fracture of left hip, initial encounter (Primary)        ED Disposition Condition    Transfer to Another Facility Stable                López Adrian MD  11/05/22 [id number]

## 2022-11-05 NOTE — NURSING
Nurses Note -- 4 Eyes      11/5/2022   6:18 PM      Skin assessed during: Admit      [x] No Pressure Injuries Present    []Prevention Measures Documented      [] Yes- Altered Skin Integrity Present or Discovered   [] LDA Added if Not in Epic (Describe Wound)   [] New Altered Skin Integrity was Present on Admit and Documented in LDA   [] Wound Image Taken    Wound Care Consulted? No    Attending Nurse:  Vikki Guadalupe RN     Second RN/Staff Member:  Carmina Lopez

## 2022-11-05 NOTE — PROVIDER TRANSFER
Outside Transfer Acceptance Note / Regional Referral Center    Referring facility: [facility]   Referring provider: LORAINE DARDEN  Accepting facility: [facility]  Accepting provider: Indira Mckay MD  Admitting provider: Indira Mckay MD  Reason for transfer:  Hip fracture  Transfer diagnosis: Hip fracture  Transfer specialty requested: Orthopedic Surgery  Transfer specialty notified: yes  Transfer level: NUMBER 1-5: 2  Bed type requested: Med Surg  Isolation status: No active isolations   Admission class or status: IP- Inpatient      Narrative     Transfer Diagnosis:  Left hip fracture  Reason for Transfer:  Left hip fracture  Consultants:  Ortho  Transferring Facility:  [facility] ED  Transferring Destination: [facility]    Ms. Socorro Willis is a 47 y.o. female who presented to the [facility] ED for evaluation of left hip pain.  She was intoxicated on 11/4, and ended up falling onto her left hip.  She feels like she missed a step when she was coming out of her tailor, and ended up falling.  The following day, she has been ambulating with a limp, but thought she just pulled a muscle with her fall.     Upon arrival to the ER, vitals were temp 98.6F, , /90.  Labs were normal.  Imaging showed an acute comminuted basicervical fracture of the left femoral neck with mild varus angulation.  LE US was negative for DVT.  Her case was discussed with Dr. Norris of Ortho, who suggested transfer to [facility] as he has other ortho cases at that facility on 11/6.  She will be transferred to [facility] for further management.    Consult Ortho, tentative plan for surgery tomorrow      Objective     Vitals: Temp: 98.6 °F (37 °C) (11/05/22 1242)  Pulse: 82 (11/05/22 1500)  Resp: 20 (11/05/22 1458)  BP: (!) 198/93 (11/05/22 1500)  SpO2: 99 % (11/05/22 1500)  Recent Labs: All pertinent labs within the past 24 hours have been reviewed.  CBC:   Recent Labs   Lab 11/05/22  1458   WBC 11.69   HGB 13.9   HCT 42.1        CMP:  No results for input(s): NA, K, CL, CO2, GLU, BUN, CREATININE, CALCIUM, PROT, ALBUMIN, BILITOT, ALKPHOS, AST, ALT, ANIONGAP, EGFRNONAA in the last 48 hours.    Invalid input(s): ESTGFAFRICA    IV access:        Peripheral IV - Single Lumen 11/05/22 1510 18 G Right Antecubital (Active)   Site Assessment Clean;Dry;Intact;No redness;No swelling 11/05/22 1510   Extremity Assessment Distal to IV Warm;Dry 11/05/22 1510   Line Status Blood return noted;Flushed;Saline locked 11/05/22 1510   Dressing Intervention First dressing 11/05/22 1510     Allergies: Review of patient's allergies indicates:  No Known Allergies   NPO: No    Anticoagulation:   Anticoagulants       None             Instructions      Gureo Garnica-  Admit to Hospital Medicine  Upon patient arrival to floor, please send SecureChat to [facility] or call extension [phone] (if no answer, this will flip to a beeper, so enter your call back number) for Hospital Medicine admit team assignment and for additional admit orders for the patient.  Do not page the attending physician associated with the patient on arrival (this physician may not be on duty at the time of arrival).  Rather, always call [phone] to reach the triage physician for orders and team assignment.      Indira Mckay MD, [facility]  Senior Physician  Hospital Medicine

## 2022-11-05 NOTE — ED NOTES
Pt here for left groin pain, she states she was walking and felt a pop. Later that night she went to the bathroom and twisted her body and the pain has escalated. She now feels it in her back and butt.

## 2022-11-06 ENCOUNTER — ANESTHESIA (OUTPATIENT)
Dept: SURGERY | Facility: HOSPITAL | Age: 47
DRG: 482 | End: 2022-11-06
Payer: MEDICAID

## 2022-11-06 ENCOUNTER — ANESTHESIA EVENT (OUTPATIENT)
Dept: SURGERY | Facility: HOSPITAL | Age: 47
DRG: 482 | End: 2022-11-06
Payer: MEDICAID

## 2022-11-06 LAB
ALBUMIN SERPL BCP-MCNC: 2.9 G/DL (ref 3.5–5.2)
ALP SERPL-CCNC: 112 U/L (ref 55–135)
ALT SERPL W/O P-5'-P-CCNC: 16 U/L (ref 10–44)
ANION GAP SERPL CALC-SCNC: 10 MMOL/L (ref 8–16)
AST SERPL-CCNC: 19 U/L (ref 10–40)
BASOPHILS # BLD AUTO: 0.02 K/UL (ref 0–0.2)
BASOPHILS NFR BLD: 0.2 % (ref 0–1.9)
BILIRUB SERPL-MCNC: 0.7 MG/DL (ref 0.1–1)
BUN SERPL-MCNC: 7 MG/DL (ref 6–20)
CALCIUM SERPL-MCNC: 8.3 MG/DL (ref 8.7–10.5)
CHLORIDE SERPL-SCNC: 102 MMOL/L (ref 95–110)
CO2 SERPL-SCNC: 26 MMOL/L (ref 23–29)
CREAT SERPL-MCNC: 0.7 MG/DL (ref 0.5–1.4)
DIFFERENTIAL METHOD: ABNORMAL
EOSINOPHIL # BLD AUTO: 0.2 K/UL (ref 0–0.5)
EOSINOPHIL NFR BLD: 1.7 % (ref 0–8)
ERYTHROCYTE [DISTWIDTH] IN BLOOD BY AUTOMATED COUNT: 14.6 % (ref 11.5–14.5)
EST. GFR  (NO RACE VARIABLE): >60 ML/MIN/1.73 M^2
GLUCOSE SERPL-MCNC: 108 MG/DL (ref 70–110)
HCT VFR BLD AUTO: 39.9 % (ref 37–48.5)
HGB BLD-MCNC: 12.7 G/DL (ref 12–16)
IMM GRANULOCYTES # BLD AUTO: 0.05 K/UL (ref 0–0.04)
IMM GRANULOCYTES NFR BLD AUTO: 0.6 % (ref 0–0.5)
LYMPHOCYTES # BLD AUTO: 1.8 K/UL (ref 1–4.8)
LYMPHOCYTES NFR BLD: 20.5 % (ref 18–48)
MAGNESIUM SERPL-MCNC: 2.1 MG/DL (ref 1.6–2.6)
MCH RBC QN AUTO: 31.3 PG (ref 27–31)
MCHC RBC AUTO-ENTMCNC: 31.8 G/DL (ref 32–36)
MCV RBC AUTO: 98 FL (ref 82–98)
MONOCYTES # BLD AUTO: 0.8 K/UL (ref 0.3–1)
MONOCYTES NFR BLD: 9.3 % (ref 4–15)
NEUTROPHILS # BLD AUTO: 5.8 K/UL (ref 1.8–7.7)
NEUTROPHILS NFR BLD: 67.7 % (ref 38–73)
NRBC BLD-RTO: 0 /100 WBC
PHOSPHATE SERPL-MCNC: 2.7 MG/DL (ref 2.7–4.5)
PLATELET # BLD AUTO: 208 K/UL (ref 150–450)
PMV BLD AUTO: 10.6 FL (ref 9.2–12.9)
POTASSIUM SERPL-SCNC: 3.4 MMOL/L (ref 3.5–5.1)
PROT SERPL-MCNC: 6.4 G/DL (ref 6–8.4)
RBC # BLD AUTO: 4.06 M/UL (ref 4–5.4)
SODIUM SERPL-SCNC: 138 MMOL/L (ref 136–145)
WBC # BLD AUTO: 8.6 K/UL (ref 3.9–12.7)

## 2022-11-06 PROCEDURE — 25000003 PHARM REV CODE 250: Performed by: NURSE PRACTITIONER

## 2022-11-06 PROCEDURE — 27236 TREAT THIGH FRACTURE: CPT | Mod: LT,,, | Performed by: ORTHOPAEDIC SURGERY

## 2022-11-06 PROCEDURE — 63600175 PHARM REV CODE 636 W HCPCS: Performed by: NURSE ANESTHETIST, CERTIFIED REGISTERED

## 2022-11-06 PROCEDURE — 83735 ASSAY OF MAGNESIUM: CPT | Performed by: STUDENT IN AN ORGANIZED HEALTH CARE EDUCATION/TRAINING PROGRAM

## 2022-11-06 PROCEDURE — S4991 NICOTINE PATCH NONLEGEND: HCPCS | Performed by: NURSE PRACTITIONER

## 2022-11-06 PROCEDURE — 27236 PR FEMORAL FX, OPEN TX: ICD-10-PCS | Mod: LT,,, | Performed by: ORTHOPAEDIC SURGERY

## 2022-11-06 PROCEDURE — C1769 GUIDE WIRE: HCPCS | Performed by: ORTHOPAEDIC SURGERY

## 2022-11-06 PROCEDURE — 27201423 OPTIME MED/SURG SUP & DEVICES STERILE SUPPLY: Performed by: ORTHOPAEDIC SURGERY

## 2022-11-06 PROCEDURE — 37000009 HC ANESTHESIA EA ADD 15 MINS: Performed by: ORTHOPAEDIC SURGERY

## 2022-11-06 PROCEDURE — D9220A PRA ANESTHESIA: ICD-10-PCS | Mod: CRNA,,, | Performed by: NURSE ANESTHETIST, CERTIFIED REGISTERED

## 2022-11-06 PROCEDURE — D9220A PRA ANESTHESIA: Mod: ANES,,, | Performed by: ANESTHESIOLOGY

## 2022-11-06 PROCEDURE — 27200651 HC AIRWAY, LMA: Performed by: ANESTHESIOLOGY

## 2022-11-06 PROCEDURE — 84100 ASSAY OF PHOSPHORUS: CPT | Performed by: STUDENT IN AN ORGANIZED HEALTH CARE EDUCATION/TRAINING PROGRAM

## 2022-11-06 PROCEDURE — D9220A PRA ANESTHESIA: Mod: CRNA,,, | Performed by: NURSE ANESTHETIST, CERTIFIED REGISTERED

## 2022-11-06 PROCEDURE — 36000711: Performed by: ORTHOPAEDIC SURGERY

## 2022-11-06 PROCEDURE — 63600175 PHARM REV CODE 636 W HCPCS: Performed by: ANESTHESIOLOGY

## 2022-11-06 PROCEDURE — 71000033 HC RECOVERY, INTIAL HOUR: Performed by: ORTHOPAEDIC SURGERY

## 2022-11-06 PROCEDURE — 36000710: Performed by: ORTHOPAEDIC SURGERY

## 2022-11-06 PROCEDURE — 12000002 HC ACUTE/MED SURGE SEMI-PRIVATE ROOM

## 2022-11-06 PROCEDURE — 85025 COMPLETE CBC W/AUTO DIFF WBC: CPT | Performed by: STUDENT IN AN ORGANIZED HEALTH CARE EDUCATION/TRAINING PROGRAM

## 2022-11-06 PROCEDURE — 80053 COMPREHEN METABOLIC PANEL: CPT | Performed by: STUDENT IN AN ORGANIZED HEALTH CARE EDUCATION/TRAINING PROGRAM

## 2022-11-06 PROCEDURE — D9220A PRA ANESTHESIA: ICD-10-PCS | Mod: ANES,,, | Performed by: ANESTHESIOLOGY

## 2022-11-06 PROCEDURE — 99223 PR INITIAL HOSPITAL CARE,LEVL III: ICD-10-PCS | Mod: 57,,, | Performed by: ORTHOPAEDIC SURGERY

## 2022-11-06 PROCEDURE — 71000039 HC RECOVERY, EACH ADD'L HOUR: Performed by: ORTHOPAEDIC SURGERY

## 2022-11-06 PROCEDURE — 25000003 PHARM REV CODE 250: Performed by: NURSE ANESTHETIST, CERTIFIED REGISTERED

## 2022-11-06 PROCEDURE — 63600175 PHARM REV CODE 636 W HCPCS: Performed by: ORTHOPAEDIC SURGERY

## 2022-11-06 PROCEDURE — 63600175 PHARM REV CODE 636 W HCPCS: Performed by: STUDENT IN AN ORGANIZED HEALTH CARE EDUCATION/TRAINING PROGRAM

## 2022-11-06 PROCEDURE — 99223 1ST HOSP IP/OBS HIGH 75: CPT | Mod: 57,,, | Performed by: ORTHOPAEDIC SURGERY

## 2022-11-06 PROCEDURE — 25000003 PHARM REV CODE 250: Performed by: ANESTHESIOLOGY

## 2022-11-06 PROCEDURE — 36415 COLL VENOUS BLD VENIPUNCTURE: CPT | Performed by: STUDENT IN AN ORGANIZED HEALTH CARE EDUCATION/TRAINING PROGRAM

## 2022-11-06 PROCEDURE — C1713 ANCHOR/SCREW BN/BN,TIS/BN: HCPCS | Performed by: ORTHOPAEDIC SURGERY

## 2022-11-06 PROCEDURE — 25000003 PHARM REV CODE 250: Performed by: STUDENT IN AN ORGANIZED HEALTH CARE EDUCATION/TRAINING PROGRAM

## 2022-11-06 PROCEDURE — 37000008 HC ANESTHESIA 1ST 15 MINUTES: Performed by: ORTHOPAEDIC SURGERY

## 2022-11-06 DEVICE — NAIL IM CANN 130 DEG 10X170: Type: IMPLANTABLE DEVICE | Site: HIP | Status: FUNCTIONAL

## 2022-11-06 DEVICE — SCREW TFN ADVANCE 90MM: Type: IMPLANTABLE DEVICE | Site: HIP | Status: FUNCTIONAL

## 2022-11-06 DEVICE — SCREW LOCKING 34MM: Type: IMPLANTABLE DEVICE | Site: HIP | Status: FUNCTIONAL

## 2022-11-06 RX ORDER — MEPERIDINE HYDROCHLORIDE 50 MG/ML
12.5 INJECTION INTRAMUSCULAR; INTRAVENOUS; SUBCUTANEOUS ONCE
Status: DISCONTINUED | OUTPATIENT
Start: 2022-11-06 | End: 2022-11-07

## 2022-11-06 RX ORDER — LIDOCAINE HCL/PF 100 MG/5ML
SYRINGE (ML) INTRAVENOUS
Status: DISCONTINUED | OUTPATIENT
Start: 2022-11-06 | End: 2022-11-06

## 2022-11-06 RX ORDER — CEFAZOLIN SODIUM 1 G/3ML
INJECTION, POWDER, FOR SOLUTION INTRAMUSCULAR; INTRAVENOUS
Status: DISCONTINUED | OUTPATIENT
Start: 2022-11-06 | End: 2022-11-06

## 2022-11-06 RX ORDER — DEXAMETHASONE SODIUM PHOSPHATE 4 MG/ML
INJECTION, SOLUTION INTRA-ARTICULAR; INTRALESIONAL; INTRAMUSCULAR; INTRAVENOUS; SOFT TISSUE
Status: DISCONTINUED | OUTPATIENT
Start: 2022-11-06 | End: 2022-11-06

## 2022-11-06 RX ORDER — HYDRALAZINE HYDROCHLORIDE 20 MG/ML
5 INJECTION INTRAMUSCULAR; INTRAVENOUS ONCE
Status: COMPLETED | OUTPATIENT
Start: 2022-11-06 | End: 2022-11-06

## 2022-11-06 RX ORDER — ONDANSETRON 2 MG/ML
INJECTION INTRAMUSCULAR; INTRAVENOUS
Status: DISCONTINUED | OUTPATIENT
Start: 2022-11-06 | End: 2022-11-06

## 2022-11-06 RX ORDER — KETOROLAC TROMETHAMINE 30 MG/ML
INJECTION, SOLUTION INTRAMUSCULAR; INTRAVENOUS
Status: DISCONTINUED | OUTPATIENT
Start: 2022-11-06 | End: 2022-11-06

## 2022-11-06 RX ORDER — MIDAZOLAM HYDROCHLORIDE 1 MG/ML
INJECTION INTRAMUSCULAR; INTRAVENOUS
Status: DISCONTINUED | OUTPATIENT
Start: 2022-11-06 | End: 2022-11-06

## 2022-11-06 RX ORDER — LANOLIN ALCOHOL/MO/W.PET/CERES
800 CREAM (GRAM) TOPICAL
Status: DISCONTINUED | OUTPATIENT
Start: 2022-11-06 | End: 2022-11-08 | Stop reason: HOSPADM

## 2022-11-06 RX ORDER — CEFAZOLIN SODIUM 2 G/50ML
2 SOLUTION INTRAVENOUS
Status: COMPLETED | OUTPATIENT
Start: 2022-11-06 | End: 2022-11-07

## 2022-11-06 RX ORDER — LIDOCAINE HYDROCHLORIDE 10 MG/ML
0.5 INJECTION, SOLUTION EPIDURAL; INFILTRATION; INTRACAUDAL; PERINEURAL ONCE
Status: CANCELLED | OUTPATIENT
Start: 2022-11-06 | End: 2022-11-06

## 2022-11-06 RX ORDER — HYDRALAZINE HYDROCHLORIDE 20 MG/ML
10 INJECTION INTRAMUSCULAR; INTRAVENOUS EVERY 6 HOURS PRN
Status: DISCONTINUED | OUTPATIENT
Start: 2022-11-06 | End: 2022-11-08 | Stop reason: HOSPADM

## 2022-11-06 RX ORDER — DIPHENHYDRAMINE HYDROCHLORIDE 50 MG/ML
25 INJECTION INTRAMUSCULAR; INTRAVENOUS EVERY 6 HOURS PRN
Status: DISCONTINUED | OUTPATIENT
Start: 2022-11-06 | End: 2022-11-07

## 2022-11-06 RX ORDER — PROPOFOL 10 MG/ML
VIAL (ML) INTRAVENOUS
Status: DISCONTINUED | OUTPATIENT
Start: 2022-11-06 | End: 2022-11-06

## 2022-11-06 RX ORDER — SCOLOPAMINE TRANSDERMAL SYSTEM 1 MG/1
1 PATCH, EXTENDED RELEASE TRANSDERMAL
Status: DISCONTINUED | OUTPATIENT
Start: 2022-11-06 | End: 2022-11-07

## 2022-11-06 RX ORDER — OXYCODONE HYDROCHLORIDE 5 MG/1
5 TABLET ORAL
Status: DISCONTINUED | OUTPATIENT
Start: 2022-11-06 | End: 2022-11-07

## 2022-11-06 RX ORDER — SODIUM,POTASSIUM PHOSPHATES 280-250MG
2 POWDER IN PACKET (EA) ORAL
Status: DISCONTINUED | OUTPATIENT
Start: 2022-11-06 | End: 2022-11-08 | Stop reason: HOSPADM

## 2022-11-06 RX ORDER — FENTANYL CITRATE 50 UG/ML
25 INJECTION, SOLUTION INTRAMUSCULAR; INTRAVENOUS EVERY 5 MIN PRN
Status: DISCONTINUED | OUTPATIENT
Start: 2022-11-06 | End: 2022-11-07

## 2022-11-06 RX ORDER — ONDANSETRON 2 MG/ML
4 INJECTION INTRAMUSCULAR; INTRAVENOUS ONCE
Status: COMPLETED | OUTPATIENT
Start: 2022-11-06 | End: 2022-11-06

## 2022-11-06 RX ORDER — SODIUM CHLORIDE, SODIUM LACTATE, POTASSIUM CHLORIDE, CALCIUM CHLORIDE 600; 310; 30; 20 MG/100ML; MG/100ML; MG/100ML; MG/100ML
INJECTION, SOLUTION INTRAVENOUS CONTINUOUS
Status: CANCELLED | OUTPATIENT
Start: 2022-11-06

## 2022-11-06 RX ORDER — HYDROMORPHONE HYDROCHLORIDE 2 MG/ML
0.2 INJECTION, SOLUTION INTRAMUSCULAR; INTRAVENOUS; SUBCUTANEOUS EVERY 5 MIN PRN
Status: DISCONTINUED | OUTPATIENT
Start: 2022-11-06 | End: 2022-11-07

## 2022-11-06 RX ORDER — ASPIRIN 81 MG/1
81 TABLET ORAL 2 TIMES DAILY
Status: DISCONTINUED | OUTPATIENT
Start: 2022-11-06 | End: 2022-11-08 | Stop reason: HOSPADM

## 2022-11-06 RX ORDER — ACETAMINOPHEN 10 MG/ML
INJECTION, SOLUTION INTRAVENOUS
Status: DISCONTINUED | OUTPATIENT
Start: 2022-11-06 | End: 2022-11-06

## 2022-11-06 RX ORDER — FENTANYL CITRATE 50 UG/ML
INJECTION, SOLUTION INTRAMUSCULAR; INTRAVENOUS
Status: DISCONTINUED | OUTPATIENT
Start: 2022-11-06 | End: 2022-11-06

## 2022-11-06 RX ADMIN — DEXAMETHASONE SODIUM PHOSPHATE 4 MG: 4 INJECTION, SOLUTION INTRA-ARTICULAR; INTRALESIONAL; INTRAMUSCULAR; INTRAVENOUS; SOFT TISSUE at 10:11

## 2022-11-06 RX ADMIN — Medication 1 PATCH: at 02:11

## 2022-11-06 RX ADMIN — SODIUM CHLORIDE: 0.9 INJECTION, SOLUTION INTRAVENOUS at 02:11

## 2022-11-06 RX ADMIN — FENTANYL CITRATE 50 MCG: 50 INJECTION, SOLUTION INTRAMUSCULAR; INTRAVENOUS at 10:11

## 2022-11-06 RX ADMIN — OXYCODONE 5 MG: 5 TABLET ORAL at 11:11

## 2022-11-06 RX ADMIN — HYDROMORPHONE HYDROCHLORIDE 0.2 MG: 2 INJECTION INTRAMUSCULAR; INTRAVENOUS; SUBCUTANEOUS at 11:11

## 2022-11-06 RX ADMIN — SCOPALAMINE 1 PATCH: 1 PATCH, EXTENDED RELEASE TRANSDERMAL at 09:11

## 2022-11-06 RX ADMIN — KETOROLAC TROMETHAMINE 30 MG: 30 INJECTION, SOLUTION INTRAMUSCULAR; INTRAVENOUS at 11:11

## 2022-11-06 RX ADMIN — ASPIRIN 81 MG: 81 TABLET, COATED ORAL at 09:11

## 2022-11-06 RX ADMIN — HYDROMORPHONE HYDROCHLORIDE 0.2 MG: 2 INJECTION INTRAMUSCULAR; INTRAVENOUS; SUBCUTANEOUS at 12:11

## 2022-11-06 RX ADMIN — MUPIROCIN: 20 OINTMENT TOPICAL at 09:11

## 2022-11-06 RX ADMIN — SODIUM CHLORIDE, SODIUM GLUCONATE, SODIUM ACETATE, POTASSIUM CHLORIDE, MAGNESIUM CHLORIDE, SODIUM PHOSPHATE, DIBASIC, AND POTASSIUM PHOSPHATE: .53; .5; .37; .037; .03; .012; .00082 INJECTION, SOLUTION INTRAVENOUS at 10:11

## 2022-11-06 RX ADMIN — ONDANSETRON 4 MG: 2 INJECTION INTRAMUSCULAR; INTRAVENOUS at 12:11

## 2022-11-06 RX ADMIN — FENTANYL CITRATE 25 MCG: 50 INJECTION INTRAMUSCULAR; INTRAVENOUS at 11:11

## 2022-11-06 RX ADMIN — MORPHINE SULFATE 4 MG: 4 INJECTION INTRAVENOUS at 06:11

## 2022-11-06 RX ADMIN — PROPOFOL 150 MG: 10 INJECTION, EMULSION INTRAVENOUS at 10:11

## 2022-11-06 RX ADMIN — LIDOCAINE HYDROCHLORIDE 100 MG: 20 INJECTION INTRAVENOUS at 10:11

## 2022-11-06 RX ADMIN — MUPIROCIN: 20 OINTMENT TOPICAL at 02:11

## 2022-11-06 RX ADMIN — HYDRALAZINE HYDROCHLORIDE 5 MG: 20 INJECTION INTRAMUSCULAR; INTRAVENOUS at 01:11

## 2022-11-06 RX ADMIN — HYDRALAZINE HYDROCHLORIDE 5 MG: 20 INJECTION INTRAMUSCULAR; INTRAVENOUS at 12:11

## 2022-11-06 RX ADMIN — MORPHINE SULFATE 4 MG: 4 INJECTION INTRAVENOUS at 07:11

## 2022-11-06 RX ADMIN — MORPHINE SULFATE 4 MG: 4 INJECTION INTRAVENOUS at 02:11

## 2022-11-06 RX ADMIN — FOLIC ACID 1 MG: 1 TABLET ORAL at 02:11

## 2022-11-06 RX ADMIN — ACETAMINOPHEN 1000 MG: 10 INJECTION, SOLUTION INTRAVENOUS at 10:11

## 2022-11-06 RX ADMIN — FENTANYL CITRATE 50 MCG: 50 INJECTION, SOLUTION INTRAMUSCULAR; INTRAVENOUS at 11:11

## 2022-11-06 RX ADMIN — ONDANSETRON 4 MG: 2 INJECTION INTRAMUSCULAR; INTRAVENOUS at 10:11

## 2022-11-06 RX ADMIN — GLYCOPYRROLATE 0.2 MG: 0.2 INJECTION, SOLUTION INTRAMUSCULAR; INTRAVITREAL at 10:11

## 2022-11-06 RX ADMIN — THIAMINE HCL TAB 100 MG 100 MG: 100 TAB at 02:11

## 2022-11-06 RX ADMIN — CEFAZOLIN SODIUM 2 G: 2 SOLUTION INTRAVENOUS at 06:11

## 2022-11-06 RX ADMIN — MIDAZOLAM HYDROCHLORIDE 2 MG: 1 INJECTION, SOLUTION INTRAMUSCULAR; INTRAVENOUS at 10:11

## 2022-11-06 RX ADMIN — MORPHINE SULFATE 4 MG: 4 INJECTION INTRAVENOUS at 10:11

## 2022-11-06 RX ADMIN — CEFAZOLIN 2 G: 1 INJECTION, POWDER, FOR SOLUTION INTRAVENOUS at 10:11

## 2022-11-06 NOTE — ASSESSMENT & PLAN NOTE
Body mass index is 31.01 kg/m². Obesity complicates all aspects of disease management from diagnostic modalities to treatment. Weight loss encouraged and health benefits explained to patient.

## 2022-11-06 NOTE — ANESTHESIA PREPROCEDURE EVALUATION
11/06/2022  Socorro Willis is a 47 y.o., female.      Pre-op Assessment    I have reviewed the Patient Summary Reports.     I have reviewed the Nursing Notes. I have reviewed the NPO Status.   I have reviewed the Medications.     Review of Systems  Anesthesia Hx:  No problems with previous Anesthesia    Social:  Smoker, Alcohol Use    Cardiovascular:  Cardiovascular Normal     Pulmonary:  Pulmonary Normal    Renal/:  Renal/ Normal     Musculoskeletal:   Back injury  Sciatic nerve pain   Neurological:   Neuromuscular Disease,    Endocrine:  Endocrine Normal  Obesity / BMI > 30      Physical Exam  General: Well nourished, Cooperative, Alert and Oriented    Airway:  Mallampati: II   Mouth Opening: Normal  TM Distance: Normal  Neck ROM: Normal ROM    Dental:  Multiple missing teeth up and down.  Mostly secure.      Anesthesia Plan  Type of Anesthesia, risks & benefits discussed:    Anesthesia Type: Gen ETT, Gen Supraglottic Airway, Gen Natural Airway, MAC  Intra-op Monitoring Plan: Standard ASA Monitors  Post Op Pain Control Plan: multimodal analgesia  Induction:  IV  Airway Plan: Direct, Video and Fiberoptic, Post-Induction  Informed Consent: Informed consent signed with the Patient and all parties understand the risks and agree with anesthesia plan.  All questions answered.   ASA Score: 2 Emergent    Ready For Surgery From Anesthesia Perspective.     .

## 2022-11-06 NOTE — ANESTHESIA PROCEDURE NOTES
Intubation    Date/Time: 11/6/2022 10:27 AM  Performed by: Morgan Willis CRNA  Authorized by: Ham Kenyon MD     Intubation:     Induction:  Intravenous    Mask Ventilation:  Easy mask    Attempts:  1    Attempted By:  CRNA    Difficult Airway Encountered?: No      Complications:  None    Airway Device:  Supraglottic airway/LMA    Airway Device Size:  3.0    Style/Cuff Inflation:  Cuffed (inflated to minimal occlusive pressure)    Placement Verified By:  Capnometry    Complicating Factors:  None

## 2022-11-06 NOTE — SUBJECTIVE & OBJECTIVE
Past Medical History:   Diagnosis Date    Back injury     Sciatic nerve pain        Past Surgical History:   Procedure Laterality Date    TUBAL LIGATION         Review of patient's allergies indicates:  No Known Allergies    Current Facility-Administered Medications on File Prior to Encounter   Medication    [COMPLETED] HYDROmorphone injection 0.5 mg     Current Outpatient Medications on File Prior to Encounter   Medication Sig    diclofenac (VOLTAREN) 50 MG EC tablet Take 1 tablet (50 mg total) by mouth 3 (three) times daily as needed.    oxycodone-acetaminophen 5-325 mg (PERCOCET) 5-325 mg per tablet Take 1 tablet by mouth every 4 (four) hours as needed for Pain.     Family History       Problem Relation (Age of Onset)    Heart disease Father          Tobacco Use    Smoking status: Every Day     Packs/day: 1.00     Types: Cigarettes    Smokeless tobacco: Never   Substance and Sexual Activity    Alcohol use: Yes     Alcohol/week: 14.0 standard drinks     Types: 14 Cans of beer per week    Drug use: Not Currently    Sexual activity: Not on file     Review of Systems   Constitutional:  Negative for activity change, appetite change, chills, diaphoresis, fatigue, fever and unexpected weight change.   HENT:  Negative for congestion, ear pain, facial swelling, hearing loss, sore throat and trouble swallowing.    Eyes:  Negative for pain and discharge.   Respiratory:  Negative for cough, chest tightness, shortness of breath and wheezing.    Cardiovascular:  Negative for chest pain, palpitations and leg swelling.   Gastrointestinal:  Negative for abdominal pain, blood in stool, diarrhea, nausea and vomiting.   Endocrine: Negative for polydipsia, polyphagia and polyuria.   Genitourinary:  Negative for difficulty urinating, dysuria, flank pain, frequency and urgency.   Musculoskeletal:  Positive for arthralgias and gait problem. Negative for back pain, joint swelling, neck pain and neck stiffness.   Skin:  Negative for rash  and wound.   Allergic/Immunologic: Negative for environmental allergies and immunocompromised state.   Neurological:  Negative for dizziness, seizures, syncope, speech difficulty, weakness, light-headedness, numbness and headaches.   Hematological:  Negative for adenopathy.   Psychiatric/Behavioral:  Negative for sleep disturbance and suicidal ideas. The patient is not nervous/anxious.    All other systems reviewed and are negative.  Objective:     Vital Signs (Most Recent):  Temp: 98.9 °F (37.2 °C) (11/05/22 1830)  Pulse: 76 (11/05/22 1830)  Resp: 20 (11/05/22 1941)  BP: (!) 145/77 (11/05/22 1830)  SpO2: 95 % (11/05/22 1830)   Vital Signs (24h Range):  Temp:  [98.6 °F (37 °C)-98.9 °F (37.2 °C)] 98.9 °F (37.2 °C)  Pulse:  [] 76  Resp:  [18-20] 20  SpO2:  [95 %-99 %] 95 %  BP: (141-198)/(65-93) 145/77     Weight: 79.4 kg (175 lb 0.7 oz)  Body mass index is 31.01 kg/m².    Physical Exam  Vitals and nursing note reviewed.   Constitutional:       Appearance: Normal appearance. She is obese.   HENT:      Head: Normocephalic and atraumatic.      Nose: Nose normal.      Mouth/Throat:      Mouth: Mucous membranes are moist.      Pharynx: Oropharynx is clear.      Comments: Poor oral hygiene, missing many teeth, others with significant caries  Eyes:      Extraocular Movements: Extraocular movements intact.      Pupils: Pupils are equal, round, and reactive to light.   Cardiovascular:      Rate and Rhythm: Normal rate and regular rhythm.      Pulses: Normal pulses.      Heart sounds: Normal heart sounds.   Pulmonary:      Effort: Pulmonary effort is normal.      Breath sounds: Normal breath sounds.   Abdominal:      General: Bowel sounds are normal.      Palpations: Abdomen is soft.   Musculoskeletal:         General: Tenderness present.      Cervical back: Normal range of motion and neck supple.      Left lower leg: Edema present.      Comments: Left leg shortened, painful with manipulation 1+ edema in LLE   Skin:      General: Skin is warm and dry.      Capillary Refill: Capillary refill takes less than 2 seconds.   Neurological:      General: No focal deficit present.      Mental Status: She is alert and oriented to person, place, and time.   Psychiatric:         Mood and Affect: Mood normal.         Behavior: Behavior normal.         CRANIAL NERVES     CN III, IV, VI   Pupils are equal, round, and reactive to light.     Significant Labs: All pertinent labs within the past 24 hours have been reviewed.  CBC:   Recent Labs   Lab 11/05/22  1458   WBC 11.69   HGB 13.9   HCT 42.1        CMP:   Recent Labs   Lab 11/05/22  1458   *   K 3.6   CL 98   CO2 28   *   BUN 8   CREATININE 0.6   CALCIUM 9.0   PROT 7.7   ALBUMIN 3.8   BILITOT 0.7   ALKPHOS 121   AST 25   ALT 22   ANIONGAP 8     Urine Studies:   Recent Labs   Lab 11/05/22  1458   COLORU Yellow   APPEARANCEUA Hazy*   PHUR 8.0   SPECGRAV 1.010   PROTEINUA Negative   GLUCUA Negative   KETONESU Negative   BILIRUBINUA Negative   OCCULTUA Negative   NITRITE Negative   UROBILINOGEN Negative   LEUKOCYTESUR Negative       Significant Imaging: I have reviewed all pertinent imaging results/findings within the past 24 hours.  EKG: I have reviewed all pertinent results/findings within the past 24 hours and my personal findings are: NSR, no acute ischemic changes  X-Ray Hip 2 or 3 views Left (with Pelvis when performed)    Result Date: 11/5/2022  Reason: Pain status post fall. FINDINGS: AP pelvis with two views of the left hip acute basicervical fracture of the femoral neck demonstrated with mild varus angulation. Fracture appears comminuted. No dislocation. Soft tissues are unremarkable. IMPRESSION: Acute comminuted basicervical fracture of the femoral neck with mild varus angulation. Electronically signed by:  Mango Lambert DO  11/5/2022 2:11 PM CDT Workstation: [id number]    X-Ray Chest AP Portable    Result Date: 11/5/2022  Reason: fall FINDINGS: Portable chest  without comparisons show normal cardiomediastinal silhouette. Lungs are clear. Pulmonary vasculature is normal. No acute osseous abnormality. IMPRESSION: No acute cardiopulmonary abnormality. Electronically signed by:  Mango Lambert DO  11/5/2022 2:11 PM CDT Workstation: [id number]    US Lower Extremity Veins Left    Result Date: 11/5/2022  REASON: Swelling. FINDINGS: Grayscale, color and spectral Doppler analysis of the left lower extremity deep venous system was performed. There is normal compressibility, color and spectral Doppler analysis, and augmentation in the left lower extremity deep venous system. IMPRESSION: No DVT of the left lower extremity veins. Electronically signed by:  Mango Lambert DO  11/5/2022 2:25 PM CDT Workstation: [id number]

## 2022-11-06 NOTE — ASSESSMENT & PLAN NOTE
POD 0 s/p Left Intramedullary Nailing of left hip with Dr. Sauceda  Continue to follow Orthopedic recommendations.  Needs aggressive incentive spirometry.  Follow hemoglobin and hematocrit closely.  Pain control with IV narcotics and antiemetics as needed.  Physical therapy as per Orthopedics protocol with fall precautions.  ASA 81 mg PO BID for DVT prophylaxis per orthopedic recommendations.

## 2022-11-06 NOTE — ASSESSMENT & PLAN NOTE
To the OR for open reduction and internal fixation of the left basicervical femoral neck fracture.  Patient will likely need skilled nursing or rehab upon discharge due to current home environment.    Risks, benefits, and alternatives to the procedure were explained to the patient including but not limited to damage to nerves, arteries, blood vessels, bones, tendons, ligaments, stiffness, instability, infection, DVT, PE, as well as general anesthetic complications including seizure, stroke, heart attack and even death. The patient understood these risks and wished to proceed and signed the informed consent.

## 2022-11-06 NOTE — CONSULTS
[facility]  Orthopedics  Consult Note    Patient Name: Socorro Willis  MRN: [id number]  Admission Date: 11/5/2022  Hospital Length of Stay: 1 days  Attending Provider: Sourav Estrella MD  Primary Care Provider: López Adrian MD    Patient information was obtained from patient, past medical records and ER records.     Inpatient consult to Orthopedics  Consult performed by: Mango Sauceda MD  Consult ordered by: Sourav Estrella MD  Reason for consult: L basicervical femoral neck fracture        Subjective:     Principal Problem:Fracture of femoral neck, left    Chief Complaint:   Chief Complaint   Patient presents with    left hip pain        HPI: Ms. Willis is a 47 year old female with a history of alcohol use and 1ppd smoker who presents today following a fall last night now with left hip pain. It is severe. She has been unable to bear weight on the left leg. She denies fever, chills, N/V/D, chest pain, SOB, dizziness, or LOC. She is currently living in a camper, was drinking last night and was coming out of her camper and missed a step falling on her left side. She initially presented to an [facility] and was diagnosed with Acute comminuted basicervical fracture of the femoral neck with mild varus angulation on xray.  CXR without acute consolidation, EKG NSR with no acute ischemic changes. She reports a history of heavy alcohol use and has recently began drinking again. She drinks 2 beers that are 10% APV nightly.       Past Medical History:   Diagnosis Date    Back injury     Sciatic nerve pain        Past Surgical History:   Procedure Laterality Date    TUBAL LIGATION         Review of patient's allergies indicates:  No Known Allergies    Current Facility-Administered Medications   Medication    0.9%  NaCl infusion    acetaminophen tablet 650 mg    folic acid tablet 1 mg    HYDROcodone-acetaminophen 5-325 mg per tablet 1 tablet    ketorolac injection 15 mg    LORazepam injection 1 mg     "morphine injection 4 mg    mupirocin 2 % ointment    naloxone 0.4 mg/mL injection 0.02 mg    nicotine 21 mg/24 hr 1 patch    ondansetron injection 4 mg    sodium chloride 0.9% flush 10 mL    thiamine tablet 100 mg     Family History       Problem Relation (Age of Onset)    Heart disease Father          Tobacco Use    Smoking status: Every Day     Packs/day: 1.00     Types: Cigarettes    Smokeless tobacco: Never   Substance and Sexual Activity    Alcohol use: Yes     Alcohol/week: 14.0 standard drinks     Types: 14 Cans of beer per week    Drug use: Not Currently    Sexual activity: Not on file     Review of Systems   Constitutional: Negative for chills and fever.   HENT:  Negative for congestion.    Eyes:  Negative for blurred vision.   Cardiovascular:  Negative for chest pain and syncope.   Respiratory:  Negative for cough and shortness of breath.    Endocrine: Negative for polyuria.   Hematologic/Lymphatic: Negative for bleeding problem. Does not bruise/bleed easily.   Skin:  Negative for rash.   Musculoskeletal:  Positive for falls, joint pain and joint swelling. Negative for muscle cramps, muscle weakness and myalgias.   Gastrointestinal:  Negative for abdominal pain, nausea and vomiting.   Genitourinary:  Negative for flank pain.   Neurological:  Negative for numbness and seizures.   Psychiatric/Behavioral:  Negative for altered mental status.    Allergic/Immunologic: Negative for persistent infections.   Objective:     Vital Signs (Most Recent):  Temp: 97.2 °F (36.2 °C) (11/06/22 0402)  Pulse: 76 (11/06/22 0402)  Resp: 18 (11/06/22 0712)  BP: 126/64 (11/06/22 0402)  SpO2: (!) 93 % (11/06/22 0402)   Vital Signs (24h Range):  Temp:  [97.2 °F (36.2 °C)-98.9 °F (37.2 °C)] 97.2 °F (36.2 °C)  Pulse:  [] 76  Resp:  [16-20] 18  SpO2:  [93 %-99 %] 93 %  BP: (113-198)/(58-93) 126/64     Weight: 79.4 kg (175 lb 0.7 oz)  Height: 5' 3" (160 cm)  Body mass index is 31.01 kg/m².      Intake/Output Summary " (Last 24 hours) at 11/6/2022 0742  Last data filed at 11/6/2022 0532  Gross per 24 hour   Intake 380 ml   Output 550 ml   Net -170 ml       General    Nursing note and vitals reviewed.  Constitutional: She is oriented to person, place, and time. She appears well-developed and well-nourished. No distress.   HENT:   Head: Atraumatic.   Eyes: EOM are normal.   Cardiovascular:  Normal rate.            Pulmonary/Chest: Effort normal.   Abdominal: Soft.   Neurological: She is alert and oriented to person, place, and time.   Psychiatric: Her behavior is normal.             Right Hip Exam   Right hip exam is normal.   Left Hip Exam     Inspection   Swelling: present  Erythema: absent    Tenderness   The patient tender to palpation of the trochanteric bursa.    Range of Motion   Extension:  abnormal   Flexion:  abnormal     Tests   Log Roll: positive    Other   Sensation: normal          Vascular Exam       Left Pulses  Dorsalis Pedis:      2+          Significant Labs: BMP:   Recent Labs   Lab 11/06/22  0352         K 3.4*      CO2 26   BUN 7   CREATININE 0.7   CALCIUM 8.3*   MG 2.1     CBC:   Recent Labs   Lab 11/05/22  1458 11/06/22 0352   WBC 11.69 8.60   HGB 13.9 12.7   HCT 42.1 39.9    208     CMP:   Recent Labs   Lab 11/05/22  1458 11/06/22 0352   * 138   K 3.6 3.4*   CL 98 102   CO2 28 26   * 108   BUN 8 7   CREATININE 0.6 0.7   CALCIUM 9.0 8.3*   PROT 7.7 6.4   ALBUMIN 3.8 2.9*   BILITOT 0.7 0.7   ALKPHOS 121 112   AST 25 19   ALT 22 16   ANIONGAP 8 10     Coagulation:   Recent Labs   Lab 11/05/22 1458   INR 1.0     All pertinent labs within the past 24 hours have been reviewed.    Significant Imaging: X-Ray: I have reviewed all pertinent results/findings and my personal findings are:  X-rays left femur and hip show a basicervical femoral neck fracture.    Assessment/Plan:     * Fracture of femoral neck, left  To the OR for open reduction and internal fixation of the left  basicervical femoral neck fracture.  Patient will likely need skilled nursing or rehab upon discharge due to current home environment.    Risks, benefits, and alternatives to the procedure were explained to the patient including but not limited to damage to nerves, arteries, blood vessels, bones, tendons, ligaments, stiffness, instability, infection, DVT, PE, as well as general anesthetic complications including seizure, stroke, heart attack and even death. The patient understood these risks and wished to proceed and signed the informed consent.           Thank you for your consult. I will follow-up with patient. Please contact us if you have any additional questions.    Mango Sauceda MD  Orthopedics  [facility]

## 2022-11-06 NOTE — ASSESSMENT & PLAN NOTE
Admit to med/surg  Consult orthopedist - plans for OR in AM   NPO after MN  Pain control   PT/OT

## 2022-11-06 NOTE — ASSESSMENT & PLAN NOTE
Dangers of cigarette smoking were reviewed with patient in detail for 10 minutes and patient was encouraged to quit. Nicotine replacement options were discussed.    Nicotine patch ordered

## 2022-11-06 NOTE — OP NOTE
[facility]  Orthopedic Surgery  Operative Note    SUMMARY     Date of Procedure: 11/6/2022     Procedure: Procedure(s) (LRB):  ORIF, FRACTURE, FEMUR, INTERTROCHANTERIC (Left)       Surgeon(s) and Role:     * Mango Sauceda MD - Primary    1st Assist: SMA Carey    Pre-Operative Diagnosis: Closed fracture of left hip, initial encounter [S72.002A]    Post-Operative Diagnosis: Post-Op Diagnosis Codes:     * Closed fracture of left hip, initial encounter [S72.002A]    Anesthesia: Choice    Description of the Findings of the Procedure:  Displaced left basicervical femoral neck fracture    Estimated Blood Loss (EBL): 50ml  Implants:   Implant Name Type Inv. Item Serial No.  Lot No. LRB No. Used Action   SCREW TFN ADVANCE 90MM - [id number]  SCREW TFN ADVANCE 90MM  [employer] [id number] Left 1 Implanted   NAIL IM ALONSO 130 DEG 10X170 - [id number]  NAIL IM ALONSO 130 DEG 10X170  [employer] [id number] Left 1 Implanted   VERENA REAM W/BL TP 2.5MM D 950 - [id number]  VERENA REAM W/BL TP 2.5MM D 950  [employer] [id number] Left 1 Implanted and Explanted   SCREW LOCKING 34MM - [id number]  SCREW LOCKING 34MM  [employer]  Left 1 Implanted       Specimens:   Specimen (24h ago, onward)      None                    Condition: Good    Disposition: PACU - hemodynamically stable.    Attestation: I was present and scrubbed for the entire procedure.    INDICATIONS FOR THE PROCEDURE: A 47year-old patient sustained a fall. The patient sustained a hip fracture. The patient was medically cleared and after discussion with the family proceeded with the procedure above.    PROCEDURE IN DETAIL: Risks, benefits and alternatives of the procedure were   explained to the patient and family including, but not limited to damage to   nerves, arteries, blood vessels. Also explained the risk of nonunion, malunion,  hardware prominence, hardware failure, cut out as well as infection, DVT, PE as  well as anesthetic complications including  seizure, stroke, heart attack and   death. They understood this and signed informed consent. The patient's left   hip was marked prior to coming to the Operating Room. Once a formal timeout was  done in which correct patient, procedure and op site were all correctly   identified and confirmed by the entire operating team. Then, 2 grams of Ancef were given prior to surgical incision.  General anesthesia was induced. The patient's  left lower extremity was prepped and draped in normal   sterile fashion. Fluoro was brought in to make sure that we could adequately   reduce the fracture closed which we could with traction on the fracture table.   A 3 cm incision was made proximal to the tip of the greater trochanter onto skin  in the fascia usman. A guidewire was inserted right into the tip of the greater  trochanter and in the center of the femoral neck. This was confirmed on lateral fluoro and then   the entry reamer was used to ream our starting point. A guidewire was placed   with the ball-tipped going distally. We then reamed up to an 11-,1/2 so that the size 10 [employer] short TFN nail could be placed without resistance. After  this was done, the nail was then inserted all the way down until the lag screw would go into the center of the head.    THe lag guide was placed and skin was incised and then the guidewire was inserted into the center of the   femoral head on the AP and lateral view. We then   drilled for a lag screw and then placed a lag screw in acceptable position.   After this was done, we then placed the locking set screw up top, and then   removed the guide. I placed a single distal locking screw through the targeter and it was bicortical.  After this was done, all guides were taken out. Fluoro shots were taken at   the hip in both AP and lateral planes confirming appropriate lag screw position   and 1 distally to confirm bicortical locking screw fixation. After this was   done, we proceeded with closing.   Wounds were irrigated with normal   saline. Deep tissue was closed using 0 Vicryl, subcutaneous tissue was closed   using 2-0 Vicryl and then skin staples. Sterile dressing was applied. They were  extubated, awakened, and was transferred from the Operating Room to the Recovery  Room in stable condition.

## 2022-11-06 NOTE — PLAN OF CARE
Problem: Adult Inpatient Plan of Care  Goal: Plan of Care Review  Outcome: Ongoing, Progressing   Supine with HOB up 45. POC and medications reviewed with pt. Verbalized understanding.  NS infusing at 125cc/hr into right ac without difficulty. DDI. No redness or swelling at site. Esquivel cath intact and draining clear yellow urine in drainage  bag. SR up x 2. Call light in reach. Bed in lowest position with brakes locked. Will continue to monitor.   Problem: Adult Inpatient Plan of Care  Goal: Optimal Comfort and Wellbeing  Outcome: Ongoing, Progressing   Q 2 hourly rounds made and IV site, pain and position monitored through out shift.   Problem: Adult Inpatient Plan of Care  Goal: Patient-Specific Goal (Individualized)  Outcome: Ongoing, Progressing   Pt to surgery  today and post op ORIF of left hip . DDI. No drainage.

## 2022-11-06 NOTE — ASSESSMENT & PLAN NOTE
CIWA scale with PRN ativan  States she's never experienced withdrawal, last drink was last night  Thiamine/folic acid replacement

## 2022-11-06 NOTE — HPI
Ms. Willis is a 47 year old female with a history of alcohol use and 1ppd smoker who presents today following a fall last night now with left hip pain. It is severe. She has been unable to bear weight on the left leg. She denies fever, chills, N/V/D, chest pain, SOB, dizziness, or LOC. She is currently living in a camper, was drinking last night and was coming out of her camper and missed a step falling on her left side. She initially presented to an OSH and was diagnosed with Acute comminuted basicervical fracture of the femoral neck with mild varus angulation on xray. Orthopedist was consulted and recommended transfer to this facility for surgical intervention in the morning. CXR without acute consolidation, EKG NSR with no acute ischemic changes. She reports a history of heavy alcohol use and has recently began drinking again. She drinks 2 beers that 10% alcohol nightly.

## 2022-11-06 NOTE — SUBJECTIVE & OBJECTIVE
Past Medical History:   Diagnosis Date    Back injury     Sciatic nerve pain        Past Surgical History:   Procedure Laterality Date    TUBAL LIGATION         Review of patient's allergies indicates:  No Known Allergies    Current Facility-Administered Medications   Medication    0.9%  NaCl infusion    acetaminophen tablet 650 mg    folic acid tablet 1 mg    HYDROcodone-acetaminophen 5-325 mg per tablet 1 tablet    ketorolac injection 15 mg    LORazepam injection 1 mg    morphine injection 4 mg    mupirocin 2 % ointment    naloxone 0.4 mg/mL injection 0.02 mg    nicotine 21 mg/24 hr 1 patch    ondansetron injection 4 mg    sodium chloride 0.9% flush 10 mL    thiamine tablet 100 mg     Family History       Problem Relation (Age of Onset)    Heart disease Father          Tobacco Use    Smoking status: Every Day     Packs/day: 1.00     Types: Cigarettes    Smokeless tobacco: Never   Substance and Sexual Activity    Alcohol use: Yes     Alcohol/week: 14.0 standard drinks     Types: 14 Cans of beer per week    Drug use: Not Currently    Sexual activity: Not on file     Review of Systems   Constitutional: Negative for chills and fever.   HENT:  Negative for congestion.    Eyes:  Negative for blurred vision.   Cardiovascular:  Negative for chest pain and syncope.   Respiratory:  Negative for cough and shortness of breath.    Endocrine: Negative for polyuria.   Hematologic/Lymphatic: Negative for bleeding problem. Does not bruise/bleed easily.   Skin:  Negative for rash.   Musculoskeletal:  Positive for falls, joint pain and joint swelling. Negative for muscle cramps, muscle weakness and myalgias.   Gastrointestinal:  Negative for abdominal pain, nausea and vomiting.   Genitourinary:  Negative for flank pain.   Neurological:  Negative for numbness and seizures.   Psychiatric/Behavioral:  Negative for altered mental status.    Allergic/Immunologic: Negative for persistent infections.   Objective:     Vital Signs (Most  "Recent):  Temp: 97.2 °F (36.2 °C) (11/06/22 0402)  Pulse: 76 (11/06/22 0402)  Resp: 18 (11/06/22 0712)  BP: 126/64 (11/06/22 0402)  SpO2: (!) 93 % (11/06/22 0402)   Vital Signs (24h Range):  Temp:  [97.2 °F (36.2 °C)-98.9 °F (37.2 °C)] 97.2 °F (36.2 °C)  Pulse:  [] 76  Resp:  [16-20] 18  SpO2:  [93 %-99 %] 93 %  BP: (113-198)/(58-93) 126/64     Weight: 79.4 kg (175 lb 0.7 oz)  Height: 5' 3" (160 cm)  Body mass index is 31.01 kg/m².      Intake/Output Summary (Last 24 hours) at 11/6/2022 0742  Last data filed at 11/6/2022 0532  Gross per 24 hour   Intake 380 ml   Output 550 ml   Net -170 ml       General    Nursing note and vitals reviewed.  Constitutional: She is oriented to person, place, and time. She appears well-developed and well-nourished. No distress.   HENT:   Head: Atraumatic.   Eyes: EOM are normal.   Cardiovascular:  Normal rate.            Pulmonary/Chest: Effort normal.   Abdominal: Soft.   Neurological: She is alert and oriented to person, place, and time.   Psychiatric: Her behavior is normal.             Right Hip Exam   Right hip exam is normal.   Left Hip Exam     Inspection   Swelling: present  Erythema: absent    Tenderness   The patient tender to palpation of the trochanteric bursa.    Range of Motion   Extension:  abnormal   Flexion:  abnormal     Tests   Log Roll: positive    Other   Sensation: normal          Vascular Exam       Left Pulses  Dorsalis Pedis:      2+          Significant Labs: BMP:   Recent Labs   Lab 11/06/22  0352         K 3.4*      CO2 26   BUN 7   CREATININE 0.7   CALCIUM 8.3*   MG 2.1     CBC:   Recent Labs   Lab 11/05/22  1458 11/06/22  0352   WBC 11.69 8.60   HGB 13.9 12.7   HCT 42.1 39.9    208     CMP:   Recent Labs   Lab 11/05/22  1458 11/06/22  0352   * 138   K 3.6 3.4*   CL 98 102   CO2 28 26   * 108   BUN 8 7   CREATININE 0.6 0.7   CALCIUM 9.0 8.3*   PROT 7.7 6.4   ALBUMIN 3.8 2.9*   BILITOT 0.7 0.7   ALKPHOS 121 112 "   AST 25 19   ALT 22 16   ANIONGAP 8 10     Coagulation:   Recent Labs   Lab 11/05/22  1458   INR 1.0     All pertinent labs within the past 24 hours have been reviewed.    Significant Imaging: X-Ray: I have reviewed all pertinent results/findings and my personal findings are:  X-rays left femur and hip show a basicervical femoral neck fracture.

## 2022-11-06 NOTE — PROGRESS NOTES
Dr Kenyon notified of 's Pt reporting pain level 4. Orders for Hydralazine 5 mg IV wait 20 minutes and notify if still elevated    1255  /105  Dr Kenyon notified Additional Hydralazine 5 mg IV ordered wait 15 minutes after administrating    1320 's Critieria met per anesthesia for transfer back to room. Pt alert oriented on room air. Pain controlled with po and IV analgesics. Tolerating po liquids. Transferred per bed to [facility] report given to Lisa Pt's significant other at bedside

## 2022-11-06 NOTE — TRANSFER OF CARE
"Anesthesia Transfer of Care Note    Patient: Socorro Willis    Procedure(s) Performed: Procedure(s) (LRB):  ORIF, FRACTURE, FEMUR, INTERTROCHANTERIC (Left)    Patient location: PACU    Anesthesia Type: general    Transport from OR: Transported from OR on 2-3 L/min O2 by NC with adequate spontaneous ventilation    Post pain: adequate analgesia    Post assessment: no apparent anesthetic complications    Post vital signs: stable    Level of consciousness: awake    Nausea/Vomiting: no nausea/vomiting    Complications: none    Transfer of care protocol was followed      Last vitals:   Visit Vitals  BP (!) 155/91   Pulse 64   Temp 36.3 °C (97.4 °F)   Resp 16   Ht 5' 3" (1.6 m)   Wt 79.4 kg (175 lb 0.7 oz)   LMP  (LMP Unknown)   SpO2 95%   Breastfeeding No   BMI 31.01 kg/m²     "

## 2022-11-06 NOTE — PROGRESS NOTES
[facility] Medicine  Progress Note    Patient Name: Socorro Willis  MRN: [id number]  Patient Class: IP- Inpatient   Admission Date: 11/5/2022  Length of Stay: 1 days  Attending Physician: Sourav Estrella MD  Primary Care Provider: López Adrian MD        Subjective:     Principal Problem:Fracture of femoral neck, left        HPI:  Ms. Willis is a 47 year old female with a history of alcohol use and 1ppd smoker who presents today following a fall last night now with left hip pain. It is severe. She has been unable to bear weight on the left leg. She denies fever, chills, N/V/D, chest pain, SOB, dizziness, or LOC. She is currently living in a camper, was drinking last night and was coming out of her camper and missed a step falling on her left side. She initially presented to an OSH and was diagnosed with Acute comminuted basicervical fracture of the femoral neck with mild varus angulation on xray. Orthopedist was consulted and recommended transfer to this facility for surgical intervention in the morning. CXR without acute consolidation, EKG NSR with no acute ischemic changes. She reports a history of heavy alcohol use and has recently began drinking again. She drinks 2 beers that 10% alcohol nightly.       Overview/Hospital Course:  No notes on file    Interval History: Patient seen and examined post operatively. Doing well.  Reports pain is 6/10 scale and relieved with pain medications.     Review of Systems   Constitutional:  Negative for chills and fever.   Respiratory:  Negative for cough, shortness of breath and wheezing.    Cardiovascular:  Negative for chest pain and palpitations.   Gastrointestinal:  Negative for abdominal pain, nausea and vomiting.   Musculoskeletal:  Positive for arthralgias and gait problem.   Skin:  Negative for color change and pallor.   Neurological:  Negative for dizziness, weakness and light-headedness.   Objective:     Vital Signs (Most Recent):  Temp: 97.4  °F (36.3 °C) (11/06/22 0800)  Pulse: 64 (11/06/22 1020)  Resp: (!) 22 (11/06/22 1020)  BP: (!) 155/91 (11/06/22 1020)  SpO2: 95 % (11/06/22 1020)   Vital Signs (24h Range):  Temp:  [97.2 °F (36.2 °C)-98.9 °F (37.2 °C)] 97.4 °F (36.3 °C)  Pulse:  [] 64  Resp:  [16-22] 22  SpO2:  [92 %-99 %] 95 %  BP: (113-198)/(58-93) 155/91     Weight: 79.4 kg (175 lb 0.7 oz)  Body mass index is 31.01 kg/m².    Intake/Output Summary (Last 24 hours) at 11/6/2022 1108  Last data filed at 11/6/2022 0948  Gross per 24 hour   Intake 380 ml   Output 650 ml   Net -270 ml      Physical Exam  Constitutional:       General: She is not in acute distress.     Appearance: She is not toxic-appearing.   Cardiovascular:      Rate and Rhythm: Normal rate and regular rhythm.      Pulses: Normal pulses.      Heart sounds: Normal heart sounds.   Pulmonary:      Effort: Pulmonary effort is normal. No respiratory distress.      Breath sounds: Normal breath sounds.   Abdominal:      General: Bowel sounds are normal. There is no distension.      Palpations: Abdomen is soft.      Tenderness: There is no abdominal tenderness. There is no guarding.   Musculoskeletal:         General: Tenderness (Left hip) present.      Comments: Left hip dressing C/D/I   Skin:     General: Skin is warm and dry.      Capillary Refill: Capillary refill takes less than 2 seconds.      Coloration: Skin is not jaundiced.      Findings: Bruising (left hip) present.   Neurological:      Mental Status: She is alert.       Significant Labs: All pertinent labs within the past 24 hours have been reviewed.  CBC:   Recent Labs   Lab 11/05/22  1458 11/06/22  0352   WBC 11.69 8.60   HGB 13.9 12.7   HCT 42.1 39.9    208     CMP:   Recent Labs   Lab 11/05/22  1458 11/06/22  0352   * 138   K 3.6 3.4*   CL 98 102   CO2 28 26   * 108   BUN 8 7   CREATININE 0.6 0.7   CALCIUM 9.0 8.3*   PROT 7.7 6.4   ALBUMIN 3.8 2.9*   BILITOT 0.7 0.7   ALKPHOS 121 112   AST 25 19   ALT  22 16   ANIONGAP 8 10       Significant Imaging: I have reviewed all pertinent imaging results/findings within the past 24 hours.      Assessment/Plan:      * Fracture of femoral neck, left  POD 0 s/p Left Intramedullary Nailing of left hip with Dr. Sauceda  Continue to follow Orthopedic recommendations.  Needs aggressive incentive spirometry.  Follow hemoglobin and hematocrit closely.  Pain control with IV narcotics and antiemetics as needed.  Physical therapy as per Orthopedics protocol with fall precautions.  ASA 81 mg PO BID for DVT prophylaxis per orthopedic recommendations.          Current every day smoker  Dangers of cigarette smoking were reviewed with patient in detail for 10 minutes and patient was encouraged to quit. Nicotine replacement options were discussed.    Nicotine patch ordered      Alcohol use disorder  CIWA scale with PRN ativan  States she's never experienced withdrawal, last drink was last night  Thiamine/folic acid replacement      Obesity (BMI 30.0-34.9)  Body mass index is 31.01 kg/m². Obesity complicates all aspects of disease management from diagnostic modalities to treatment. Weight loss encouraged and health benefits explained to patient.          VTE Risk Mitigation (From admission, onward)         Ordered     IP VTE HIGH RISK PATIENT  Once         11/05/22 1854     Place sequential compression device  Until discontinued         11/05/22 1854     Reason for No Pharmacological VTE Prophylaxis  Once        Question:  Reasons:  Answer:  Risk of Bleeding    11/05/22 1854                Discharge Planning   ALONZO:      Code Status: Full Code   Is the patient medically ready for discharge?:     Reason for patient still in hospital (select all that apply): Patient trending condition, Treatment, Consult recommendations and PT / OT recommendations                     Elva Jameson NP  Department of Hospital Medicine   [facility]

## 2022-11-06 NOTE — H&P
[facility] Medicine  History & Physical    Patient Name: Socorro Willis  MRN: [id number]  Patient Class: IP- Inpatient  Admission Date: 11/5/2022  Attending Physician: Dr. Estrella  Primary Care Provider: López Adrian MD         Patient information was obtained from patient, spouse/SO, past medical records and ER records.     Subjective:     Principal Problem:Fracture of femoral neck, left    Chief Complaint:   Chief Complaint   Patient presents with    left hip pain        HPI: Ms. Willis is a 47 year old female with a history of alcohol use and 1ppd smoker who presents today following a fall last night now with left hip pain. It is severe. She has been unable to bear weight on the left leg. She denies fever, chills, N/V/D, chest pain, SOB, dizziness, or LOC. She is currently living in a camper, was drinking last night and was coming out of her camper and missed a step falling on her left side. She initially presented to an OSH and was diagnosed with Acute comminuted basicervical fracture of the femoral neck with mild varus angulation on xray. Orthopedist was consulted and recommended transfer to this facility for surgical intervention in the morning. CXR without acute consolidation, EKG NSR with no acute ischemic changes. She reports a history of heavy alcohol use and has recently began drinking again. She drinks 2 beers that 10% alcohol nightly.       Past Medical History:   Diagnosis Date    Back injury     Sciatic nerve pain        Past Surgical History:   Procedure Laterality Date    TUBAL LIGATION         Review of patient's allergies indicates:  No Known Allergies    Current Facility-Administered Medications on File Prior to Encounter   Medication    [COMPLETED] HYDROmorphone injection 0.5 mg     Current Outpatient Medications on File Prior to Encounter   Medication Sig    diclofenac (VOLTAREN) 50 MG EC tablet Take 1 tablet (50 mg total) by mouth 3 (three) times daily as needed.     oxycodone-acetaminophen 5-325 mg (PERCOCET) 5-325 mg per tablet Take 1 tablet by mouth every 4 (four) hours as needed for Pain.     Family History       Problem Relation (Age of Onset)    Heart disease Father          Tobacco Use    Smoking status: Every Day     Packs/day: 1.00     Types: Cigarettes    Smokeless tobacco: Never   Substance and Sexual Activity    Alcohol use: Yes     Alcohol/week: 14.0 standard drinks     Types: 14 Cans of beer per week    Drug use: Not Currently    Sexual activity: Not on file     Review of Systems   Constitutional:  Negative for activity change, appetite change, chills, diaphoresis, fatigue, fever and unexpected weight change.   HENT:  Negative for congestion, ear pain, facial swelling, hearing loss, sore throat and trouble swallowing.    Eyes:  Negative for pain and discharge.   Respiratory:  Negative for cough, chest tightness, shortness of breath and wheezing.    Cardiovascular:  Negative for chest pain, palpitations and leg swelling.   Gastrointestinal:  Negative for abdominal pain, blood in stool, diarrhea, nausea and vomiting.   Endocrine: Negative for polydipsia, polyphagia and polyuria.   Genitourinary:  Negative for difficulty urinating, dysuria, flank pain, frequency and urgency.   Musculoskeletal:  Positive for arthralgias and gait problem. Negative for back pain, joint swelling, neck pain and neck stiffness.   Skin:  Negative for rash and wound.   Allergic/Immunologic: Negative for environmental allergies and immunocompromised state.   Neurological:  Negative for dizziness, seizures, syncope, speech difficulty, weakness, light-headedness, numbness and headaches.   Hematological:  Negative for adenopathy.   Psychiatric/Behavioral:  Negative for sleep disturbance and suicidal ideas. The patient is not nervous/anxious.    All other systems reviewed and are negative.  Objective:     Vital Signs (Most Recent):  Temp: 98.9 °F (37.2 °C) (11/05/22 1830)  Pulse: 76  (11/05/22 1830)  Resp: 20 (11/05/22 1941)  BP: (!) 145/77 (11/05/22 1830)  SpO2: 95 % (11/05/22 1830)   Vital Signs (24h Range):  Temp:  [98.6 °F (37 °C)-98.9 °F (37.2 °C)] 98.9 °F (37.2 °C)  Pulse:  [] 76  Resp:  [18-20] 20  SpO2:  [95 %-99 %] 95 %  BP: (141-198)/(65-93) 145/77     Weight: 79.4 kg (175 lb 0.7 oz)  Body mass index is 31.01 kg/m².    Physical Exam  Vitals and nursing note reviewed.   Constitutional:       Appearance: Normal appearance. She is obese.   HENT:      Head: Normocephalic and atraumatic.      Nose: Nose normal.      Mouth/Throat:      Mouth: Mucous membranes are moist.      Pharynx: Oropharynx is clear.      Comments: Poor oral hygiene, missing many teeth, others with significant caries  Eyes:      Extraocular Movements: Extraocular movements intact.      Pupils: Pupils are equal, round, and reactive to light.   Cardiovascular:      Rate and Rhythm: Normal rate and regular rhythm.      Pulses: Normal pulses.      Heart sounds: Normal heart sounds.   Pulmonary:      Effort: Pulmonary effort is normal.      Breath sounds: Normal breath sounds.   Abdominal:      General: Bowel sounds are normal.      Palpations: Abdomen is soft.   Musculoskeletal:         General: Tenderness present.      Cervical back: Normal range of motion and neck supple.      Left lower leg: Edema present.      Comments: Left leg shortened, painful with manipulation 1+ edema in LLE   Skin:     General: Skin is warm and dry.      Capillary Refill: Capillary refill takes less than 2 seconds.   Neurological:      General: No focal deficit present.      Mental Status: She is alert and oriented to person, place, and time.   Psychiatric:         Mood and Affect: Mood normal.         Behavior: Behavior normal.         CRANIAL NERVES     CN III, IV, VI   Pupils are equal, round, and reactive to light.     Significant Labs: All pertinent labs within the past 24 hours have been reviewed.  CBC:   Recent Labs   Lab  11/05/22  1458   WBC 11.69   HGB 13.9   HCT 42.1        CMP:   Recent Labs   Lab 11/05/22  1458   *   K 3.6   CL 98   CO2 28   *   BUN 8   CREATININE 0.6   CALCIUM 9.0   PROT 7.7   ALBUMIN 3.8   BILITOT 0.7   ALKPHOS 121   AST 25   ALT 22   ANIONGAP 8     Urine Studies:   Recent Labs   Lab 11/05/22  1458   COLORU Yellow   APPEARANCEUA Hazy*   PHUR 8.0   SPECGRAV 1.010   PROTEINUA Negative   GLUCUA Negative   KETONESU Negative   BILIRUBINUA Negative   OCCULTUA Negative   NITRITE Negative   UROBILINOGEN Negative   LEUKOCYTESUR Negative       Significant Imaging: I have reviewed all pertinent imaging results/findings within the past 24 hours.  EKG: I have reviewed all pertinent results/findings within the past 24 hours and my personal findings are: NSR, no acute ischemic changes  X-Ray Hip 2 or 3 views Left (with Pelvis when performed)    Result Date: 11/5/2022  Reason: Pain status post fall. FINDINGS: AP pelvis with two views of the left hip acute basicervical fracture of the femoral neck demonstrated with mild varus angulation. Fracture appears comminuted. No dislocation. Soft tissues are unremarkable. IMPRESSION: Acute comminuted basicervical fracture of the femoral neck with mild varus angulation. Electronically signed by:  Mango Lambert DO  11/5/2022 2:11 PM CDT Workstation: [id number]    X-Ray Chest AP Portable    Result Date: 11/5/2022  Reason: fall FINDINGS: Portable chest without comparisons show normal cardiomediastinal silhouette. Lungs are clear. Pulmonary vasculature is normal. No acute osseous abnormality. IMPRESSION: No acute cardiopulmonary abnormality. Electronically signed by:  Mango Lambert DO  11/5/2022 2:11 PM CDT Workstation: [id number]    US Lower Extremity Veins Left    Result Date: 11/5/2022  REASON: Swelling. FINDINGS: Grayscale, color and spectral Doppler analysis of the left lower extremity deep venous system was performed. There is normal compressibility, color and  spectral Doppler analysis, and augmentation in the left lower extremity deep venous system. IMPRESSION: No DVT of the left lower extremity veins. Electronically signed by:  Mango Lambert DO  11/5/2022 2:25 PM CDT Workstation: [id number]       Assessment/Plan:     * Fracture of femoral neck, left  Admit to med/surg  Consult orthopedist - plans for OR in AM   NPO after MN  Pain control   PT/OT         Current every day smoker  Dangers of cigarette smoking were reviewed with patient in detail for 10 minutes and patient was encouraged to quit. Nicotine replacement options were discussed.    Nicotine patch ordered      Alcohol use disorder  CIWA scale with PRN ativan  States she's never experienced withdrawal, last drink was last night  Thiamine/folic acid replacement      Obesity (BMI 30.0-34.9)  Body mass index is 31.01 kg/m². Obesity complicates all aspects of disease management from diagnostic modalities to treatment. Weight loss encouraged and health benefits explained to patient.          VTE Risk Mitigation (From admission, onward)         Ordered     IP VTE HIGH RISK PATIENT  Once         11/05/22 1854     Place sequential compression device  Until discontinued         11/05/22 1854     Reason for No Pharmacological VTE Prophylaxis  Once        Question:  Reasons:  Answer:  Risk of Bleeding    11/05/22 1854                   Lillian Manning NP  Department of Hospital Medicine   [facility]

## 2022-11-06 NOTE — PLAN OF CARE
[facility]  Initial Discharge Assessment       Primary Care Provider: López Adrian MD    Admission Diagnosis: Hip fracture [S72.009A]    Admission Date: 11/5/2022  Expected Discharge Date:     Discharge Barriers Identified: None    Payor: MEDICAID / Plan: [employer] / Product Type: Managed Medicaid /     Extended Emergency Contact Information  Primary Emergency Contact: FERNANDA ORTEGA  Mobile Phone: [phone]  Relation: Significant other  Preferred language: English   needed? No    Discharge Plan A: Other (OP PT/OT)  Discharge Plan B: Rehab      [id number] [place], [place] - [address] & [facility]  [address] [facility] [address]  Phone: [phone] Fax: [phone]    SW met with patient at bedside to complete discharge planning assessment.  Patient alert and oriented xs 4.  Patient verified all demographic information on facesheet is correct.  Patient verified PCP is Dr. Adrian.  Patient [place] [employer] [place].  Patient with NO home health or DME.  Patient with NO POA or Living Will.  Patient not on dialysis or medication coumadin.  Patient with no 30 day admission.  Patient with no financial issues at this time.  Patient family will provide transportation upon discharge from facility.  Patient independent with ADLs, live with significant other, drives self.  SW discussed possible Rehab vs home with Outpatient PT/OT.  Patient in agreement with PT/OT recommendations once evaluated.  Patient choice signed for [facility] and [facility].    Initial Assessment (most recent)       Adult Discharge Assessment - 11/06/22 7419          Discharge Assessment    Assessment Type Discharge Planning Assessment     Confirmed/corrected address, phone number and insurance Yes     Confirmed Demographics Correct on Facesheet     Source of Information patient     Communicated ALONZO with patient/caregiver  Date not available/Unable to determine     Lives With significant other     Facility Arrived From: home     Do you expect to return to your current living situation? Yes     Do you have help at home or someone to help you manage your care at home? Yes     Who are your caregiver(s) and their phone number(s)? SO     Prior to hospitilization cognitive status: Alert/Oriented     Current cognitive status: Alert/Oriented     Walking or Climbing Stairs Difficulty none     Dressing/Bathing Difficulty none     Equipment Currently Used at Home none     Readmission within 30 days? No     Patient currently being followed by outpatient case management? No     Do you currently have service(s) that help you manage your care at home? No     Do you take prescription medications? Yes     Do you have prescription coverage? Yes     Do you have any problems affording any of your prescribed medications? No     Is the patient taking medications as prescribed? yes     Who is going to help you get home at discharge? SO     How do you get to doctors appointments? health plan transportation;family or friend will provide     Are you on dialysis? No     Do you take coumadin? No     Discharge Plan A Other   OP PT/OT    Discharge Plan B Rehab     DME Needed Upon Discharge  none     Discharge Plan discussed with: Patient     Discharge Barriers Identified None        Physical Activity    On average, how many days per week do you engage in moderate to strenuous exercise (like a brisk walk)? Patient refused     On average, how many minutes do you engage in exercise at this level? Patient refused        Financial Resource Strain    How hard is it for you to pay for the very basics like food, housing, medical care, and heating? Patient refused        Housing Stability    In the last 12 months, was there a time when you were not able to pay the mortgage or rent on time? Patient refused     In the last 12 months, was there a time when you did not have a  steady place to sleep or slept in a shelter (including now)? Patient refused        Transportation Needs    In the past 12 months, has lack of transportation kept you from medical appointments or from getting medications? Patient refused     In the past 12 months, has lack of transportation kept you from meetings, work, or from getting things needed for daily living? Patient refused        Food Insecurity    Within the past 12 months, you worried that your food would run out before you got the money to buy more. Patient refused     Within the past 12 months, the food you bought just didn't last and you didn't have money to get more. Patient refused        Stress    Do you feel stress - tense, restless, nervous, or anxious, or unable to sleep at night because your mind is troubled all the time - these days? Patient refused        Social Connections    In a typical week, how many times do you talk on the phone with family, friends, or neighbors? Patient refused     How often do you get together with friends or relatives? Patient refused     How often do you attend Buddhism or Denominational services? Patient refused     Do you belong to any clubs or organizations such as Buddhism groups, unions, fraternal or athletic groups, or school groups? Patient refused     How often do you attend meetings of the clubs or organizations you belong to? Patient refused     Are you , , , , never , or living with a partner? Patient refused        Alcohol Use    Q1: How often do you have a drink containing alcohol? Patient refused     Q2: How many drinks containing alcohol do you have on a typical day when you are drinking? Patient refused     Q3: How often do you have six or more drinks on one occasion? Patient refused

## 2022-11-06 NOTE — HPI
Ms. Willis is a 47 year old female with a history of alcohol use and 1ppd smoker who presents today following a fall last night now with left hip pain. It is severe. She has been unable to bear weight on the left leg. She denies fever, chills, N/V/D, chest pain, SOB, dizziness, or LOC. She is currently living in a camper, was drinking last night and was coming out of her camper and missed a step falling on her left side. She initially presented to an [facility] and was diagnosed with Acute comminuted basicervical fracture of the femoral neck with mild varus angulation on xray.  CXR without acute consolidation, EKG NSR with no acute ischemic changes. She reports a history of heavy alcohol use and has recently began drinking again. She drinks 2 beers that are 10% APV nightly.

## 2022-11-06 NOTE — PLAN OF CARE
POC discussed with patient, verbalized understanding. IV to right ac remains intact and patent with iv fluids infusing without difficulty. Esquivel cath remains intact draining clear yellow urine. N/V checks remain intact. Pain is well controlled with iv morphine. Safety measures maintained with side rails up and slip resistant socks on, call light in reach, pt instructed to call for needs.

## 2022-11-06 NOTE — SUBJECTIVE & OBJECTIVE
Interval History: Patient seen and examined post operatively. Doing well.  Reports pain is 6/10 scale and relieved with pain medications.     Review of Systems   Constitutional:  Negative for chills and fever.   Respiratory:  Negative for cough, shortness of breath and wheezing.    Cardiovascular:  Negative for chest pain and palpitations.   Gastrointestinal:  Negative for abdominal pain, nausea and vomiting.   Musculoskeletal:  Positive for arthralgias and gait problem.   Skin:  Negative for color change and pallor.   Neurological:  Negative for dizziness, weakness and light-headedness.   Objective:     Vital Signs (Most Recent):  Temp: 97.4 °F (36.3 °C) (11/06/22 0800)  Pulse: 64 (11/06/22 1020)  Resp: (!) 22 (11/06/22 1020)  BP: (!) 155/91 (11/06/22 1020)  SpO2: 95 % (11/06/22 1020)   Vital Signs (24h Range):  Temp:  [97.2 °F (36.2 °C)-98.9 °F (37.2 °C)] 97.4 °F (36.3 °C)  Pulse:  [] 64  Resp:  [16-22] 22  SpO2:  [92 %-99 %] 95 %  BP: (113-198)/(58-93) 155/91     Weight: 79.4 kg (175 lb 0.7 oz)  Body mass index is 31.01 kg/m².    Intake/Output Summary (Last 24 hours) at 11/6/2022 1108  Last data filed at 11/6/2022 0948  Gross per 24 hour   Intake 380 ml   Output 650 ml   Net -270 ml      Physical Exam  Constitutional:       General: She is not in acute distress.     Appearance: She is not toxic-appearing.   Cardiovascular:      Rate and Rhythm: Normal rate and regular rhythm.      Pulses: Normal pulses.      Heart sounds: Normal heart sounds.   Pulmonary:      Effort: Pulmonary effort is normal. No respiratory distress.      Breath sounds: Normal breath sounds.   Abdominal:      General: Bowel sounds are normal. There is no distension.      Palpations: Abdomen is soft.      Tenderness: There is no abdominal tenderness. There is no guarding.   Musculoskeletal:         General: Tenderness (Left hip) present.      Comments: Left hip dressing C/D/I   Skin:     General: Skin is warm and dry.      Capillary  Refill: Capillary refill takes less than 2 seconds.      Coloration: Skin is not jaundiced.      Findings: Bruising (left hip) present.   Neurological:      Mental Status: She is alert.       Significant Labs: All pertinent labs within the past 24 hours have been reviewed.  CBC:   Recent Labs   Lab 11/05/22  1458 11/06/22  0352   WBC 11.69 8.60   HGB 13.9 12.7   HCT 42.1 39.9    208     CMP:   Recent Labs   Lab 11/05/22  1458 11/06/22  0352   * 138   K 3.6 3.4*   CL 98 102   CO2 28 26   * 108   BUN 8 7   CREATININE 0.6 0.7   CALCIUM 9.0 8.3*   PROT 7.7 6.4   ALBUMIN 3.8 2.9*   BILITOT 0.7 0.7   ALKPHOS 121 112   AST 25 19   ALT 22 16   ANIONGAP 8 10       Significant Imaging: I have reviewed all pertinent imaging results/findings within the past 24 hours.

## 2022-11-07 LAB
ALBUMIN SERPL BCP-MCNC: 2.7 G/DL (ref 3.5–5.2)
ALP SERPL-CCNC: 100 U/L (ref 55–135)
ALT SERPL W/O P-5'-P-CCNC: 20 U/L (ref 10–44)
ANION GAP SERPL CALC-SCNC: 9 MMOL/L (ref 8–16)
AST SERPL-CCNC: 18 U/L (ref 10–40)
BASOPHILS # BLD AUTO: 0.02 K/UL (ref 0–0.2)
BASOPHILS NFR BLD: 0.2 % (ref 0–1.9)
BILIRUB SERPL-MCNC: 0.2 MG/DL (ref 0.1–1)
BUN SERPL-MCNC: 7 MG/DL (ref 6–20)
CALCIUM SERPL-MCNC: 8.5 MG/DL (ref 8.7–10.5)
CHLORIDE SERPL-SCNC: 102 MMOL/L (ref 95–110)
CO2 SERPL-SCNC: 26 MMOL/L (ref 23–29)
CREAT SERPL-MCNC: 0.7 MG/DL (ref 0.5–1.4)
DIFFERENTIAL METHOD: ABNORMAL
EOSINOPHIL # BLD AUTO: 0 K/UL (ref 0–0.5)
EOSINOPHIL NFR BLD: 0 % (ref 0–8)
ERYTHROCYTE [DISTWIDTH] IN BLOOD BY AUTOMATED COUNT: 14.3 % (ref 11.5–14.5)
EST. GFR  (NO RACE VARIABLE): >60 ML/MIN/1.73 M^2
GLUCOSE SERPL-MCNC: 135 MG/DL (ref 70–110)
HCT VFR BLD AUTO: 36.2 % (ref 37–48.5)
HGB BLD-MCNC: 11.5 G/DL (ref 12–16)
IMM GRANULOCYTES # BLD AUTO: 0.08 K/UL (ref 0–0.04)
IMM GRANULOCYTES NFR BLD AUTO: 0.7 % (ref 0–0.5)
LYMPHOCYTES # BLD AUTO: 1.2 K/UL (ref 1–4.8)
LYMPHOCYTES NFR BLD: 10.1 % (ref 18–48)
MAGNESIUM SERPL-MCNC: 2.1 MG/DL (ref 1.6–2.6)
MCH RBC QN AUTO: 31.2 PG (ref 27–31)
MCHC RBC AUTO-ENTMCNC: 31.8 G/DL (ref 32–36)
MCV RBC AUTO: 98 FL (ref 82–98)
MONOCYTES # BLD AUTO: 1 K/UL (ref 0.3–1)
MONOCYTES NFR BLD: 8.5 % (ref 4–15)
NEUTROPHILS # BLD AUTO: 9.5 K/UL (ref 1.8–7.7)
NEUTROPHILS NFR BLD: 80.5 % (ref 38–73)
NRBC BLD-RTO: 0 /100 WBC
PHOSPHATE SERPL-MCNC: 2.1 MG/DL (ref 2.7–4.5)
PLATELET # BLD AUTO: 211 K/UL (ref 150–450)
PMV BLD AUTO: 10.5 FL (ref 9.2–12.9)
POTASSIUM SERPL-SCNC: 3.8 MMOL/L (ref 3.5–5.1)
PROT SERPL-MCNC: 6.2 G/DL (ref 6–8.4)
RBC # BLD AUTO: 3.69 M/UL (ref 4–5.4)
SODIUM SERPL-SCNC: 137 MMOL/L (ref 136–145)
WBC # BLD AUTO: 11.79 K/UL (ref 3.9–12.7)

## 2022-11-07 PROCEDURE — 85025 COMPLETE CBC W/AUTO DIFF WBC: CPT | Performed by: STUDENT IN AN ORGANIZED HEALTH CARE EDUCATION/TRAINING PROGRAM

## 2022-11-07 PROCEDURE — 12000002 HC ACUTE/MED SURGE SEMI-PRIVATE ROOM

## 2022-11-07 PROCEDURE — 97165 OT EVAL LOW COMPLEX 30 MIN: CPT

## 2022-11-07 PROCEDURE — 25000003 PHARM REV CODE 250: Performed by: ORTHOPAEDIC SURGERY

## 2022-11-07 PROCEDURE — 97161 PT EVAL LOW COMPLEX 20 MIN: CPT

## 2022-11-07 PROCEDURE — 84100 ASSAY OF PHOSPHORUS: CPT | Performed by: STUDENT IN AN ORGANIZED HEALTH CARE EDUCATION/TRAINING PROGRAM

## 2022-11-07 PROCEDURE — 80053 COMPREHEN METABOLIC PANEL: CPT | Performed by: STUDENT IN AN ORGANIZED HEALTH CARE EDUCATION/TRAINING PROGRAM

## 2022-11-07 PROCEDURE — 25000003 PHARM REV CODE 250: Performed by: NURSE PRACTITIONER

## 2022-11-07 PROCEDURE — 36415 COLL VENOUS BLD VENIPUNCTURE: CPT | Performed by: STUDENT IN AN ORGANIZED HEALTH CARE EDUCATION/TRAINING PROGRAM

## 2022-11-07 PROCEDURE — 97116 GAIT TRAINING THERAPY: CPT

## 2022-11-07 PROCEDURE — 94761 N-INVAS EAR/PLS OXIMETRY MLT: CPT

## 2022-11-07 PROCEDURE — 83735 ASSAY OF MAGNESIUM: CPT | Performed by: STUDENT IN AN ORGANIZED HEALTH CARE EDUCATION/TRAINING PROGRAM

## 2022-11-07 PROCEDURE — 63600175 PHARM REV CODE 636 W HCPCS: Performed by: ORTHOPAEDIC SURGERY

## 2022-11-07 PROCEDURE — 63600175 PHARM REV CODE 636 W HCPCS: Performed by: STUDENT IN AN ORGANIZED HEALTH CARE EDUCATION/TRAINING PROGRAM

## 2022-11-07 PROCEDURE — 97535 SELF CARE MNGMENT TRAINING: CPT

## 2022-11-07 PROCEDURE — S4991 NICOTINE PATCH NONLEGEND: HCPCS | Performed by: ORTHOPAEDIC SURGERY

## 2022-11-07 RX ORDER — SODIUM CHLORIDE 0.9 % (FLUSH) 0.9 %
10 SYRINGE (ML) INJECTION
Status: DISCONTINUED | OUTPATIENT
Start: 2022-11-07 | End: 2022-11-08 | Stop reason: HOSPADM

## 2022-11-07 RX ORDER — LOPERAMIDE HYDROCHLORIDE 2 MG/1
2 CAPSULE ORAL CONTINUOUS PRN
Status: DISCONTINUED | OUTPATIENT
Start: 2022-11-07 | End: 2022-11-08 | Stop reason: HOSPADM

## 2022-11-07 RX ORDER — HYDROCODONE BITARTRATE AND ACETAMINOPHEN 5; 325 MG/1; MG/1
1 TABLET ORAL EVERY 4 HOURS PRN
Status: DISCONTINUED | OUTPATIENT
Start: 2022-11-07 | End: 2022-11-07 | Stop reason: SDUPTHER

## 2022-11-07 RX ORDER — AMOXICILLIN 250 MG
1 CAPSULE ORAL 2 TIMES DAILY
Status: DISCONTINUED | OUTPATIENT
Start: 2022-11-07 | End: 2022-11-08 | Stop reason: HOSPADM

## 2022-11-07 RX ADMIN — MUPIROCIN: 20 OINTMENT TOPICAL at 09:11

## 2022-11-07 RX ADMIN — ASPIRIN 81 MG: 81 TABLET, COATED ORAL at 09:11

## 2022-11-07 RX ADMIN — KETOROLAC TROMETHAMINE 15 MG: 30 INJECTION, SOLUTION INTRAMUSCULAR; INTRAVENOUS at 03:11

## 2022-11-07 RX ADMIN — HYDROCODONE BITARTRATE AND ACETAMINOPHEN 1 TABLET: 5; 325 TABLET ORAL at 05:11

## 2022-11-07 RX ADMIN — HYDROCODONE BITARTRATE AND ACETAMINOPHEN 1 TABLET: 5; 325 TABLET ORAL at 11:11

## 2022-11-07 RX ADMIN — DOCUSATE SODIUM AND SENNOSIDES 1 TABLET: 8.6; 5 TABLET, FILM COATED ORAL at 09:11

## 2022-11-07 RX ADMIN — HYDROCODONE BITARTRATE AND ACETAMINOPHEN 1 TABLET: 5; 325 TABLET ORAL at 07:11

## 2022-11-07 RX ADMIN — FOLIC ACID 1 MG: 1 TABLET ORAL at 09:11

## 2022-11-07 RX ADMIN — CEFAZOLIN SODIUM 2 G: 2 SOLUTION INTRAVENOUS at 02:11

## 2022-11-07 RX ADMIN — Medication 1 PATCH: at 09:11

## 2022-11-07 RX ADMIN — THIAMINE HCL TAB 100 MG 100 MG: 100 TAB at 09:11

## 2022-11-07 RX ADMIN — HYDROCODONE BITARTRATE AND ACETAMINOPHEN 1 TABLET: 5; 325 TABLET ORAL at 10:11

## 2022-11-07 NOTE — HOSPITAL COURSE
Patient was admitted for left hip fracture S/P mechanical fall.  Patient was monitored closely during her stay.  Orthopedic surgery was consulted.  Patient underwent intramedullary nailing of left hip.  Patient participated with Physical therapy and occupational therapy postperatively.  Her pain was well controlled with p.r.n. narcotics.  Patient opted for discharge to home with outpatient physical therapy.  Discharge instructions as well as return precautions were discussed with patient with good understanding.    Physical exam:  Awake alert oriented x4, no acute distress  Cardiac:  RRR  Pulmonary:  Clear to auscultation  Abdomen:  Soft, nontender  Extremities: normal range of motion. Left hip dressing C/D/I, 2+ pedal pulses

## 2022-11-07 NOTE — ASSESSMENT & PLAN NOTE
Continue with PT.  Partial weight-bearing left lower extremity.    Pain control.    VTE prophylaxis.    Placement.

## 2022-11-07 NOTE — SUBJECTIVE & OBJECTIVE
Interval History:  Patient seen and examined.  Interim reviewed.  Has just ambulated with physical therapy in the room and is now sitting up in chair.  She is reporting her pain is currently 7/10 scale.  Nursing and Case Management at bedside for DIS charge planning discussion.  Patient prefers to go home once medically stable.  Discussed outpatient physical therapy to which patient is agreeable.  Patient reports she has good home support.    Review of Systems   Constitutional:  Negative for chills and fever.   Respiratory:  Negative for cough, shortness of breath and wheezing.    Cardiovascular:  Negative for chest pain and palpitations.   Gastrointestinal:  Negative for abdominal pain, nausea and vomiting.   Musculoskeletal:  Positive for arthralgias and gait problem.   Skin:  Negative for color change and pallor.   Neurological:  Negative for dizziness, weakness and light-headedness.   Objective:     Vital Signs (Most Recent):  Temp: 97.9 °F (36.6 °C) (11/07/22 0712)  Pulse: 73 (11/07/22 0712)  Resp: 18 (11/07/22 0745)  BP: (!) 170/82 (11/07/22 0712)  SpO2: (!) 94 % (11/07/22 0712)   Vital Signs (24h Range):  Temp:  [97.2 °F (36.2 °C)-98.7 °F (37.1 °C)] 97.9 °F (36.6 °C)  Pulse:  [56-98] 73  Resp:  [10-23] 18  SpO2:  [92 %-100 %] 94 %  BP: (120-237)/() 170/82     Weight: 79.4 kg (175 lb 0.7 oz)  Body mass index is 31.01 kg/m².    Intake/Output Summary (Last 24 hours) at 11/7/2022 1001  Last data filed at 11/7/2022 0946  Gross per 24 hour   Intake 3075.8 ml   Output 2850 ml   Net 225.8 ml        Physical Exam  Constitutional:       General: She is not in acute distress.     Appearance: She is not toxic-appearing.   Cardiovascular:      Rate and Rhythm: Normal rate and regular rhythm.      Pulses: Normal pulses.      Heart sounds: Normal heart sounds.   Pulmonary:      Effort: Pulmonary effort is normal. No respiratory distress.      Breath sounds: Normal breath sounds.   Abdominal:      General: Bowel sounds  are normal. There is no distension.      Palpations: Abdomen is soft.      Tenderness: There is no abdominal tenderness. There is no guarding.   Musculoskeletal:         General: Tenderness (Left hip) present.      Comments: Left hip dressing C/D/I   Skin:     General: Skin is warm and dry.      Capillary Refill: Capillary refill takes less than 2 seconds.      Coloration: Skin is not jaundiced.      Findings: Bruising (left hip) present.   Neurological:      Mental Status: She is alert.       Significant Labs: All pertinent labs within the past 24 hours have been reviewed.  CBC:   Recent Labs   Lab 11/05/22  1458 11/06/22  0352 11/07/22  0445   WBC 11.69 8.60 11.79   HGB 13.9 12.7 11.5*   HCT 42.1 39.9 36.2*    208 211       CMP:   Recent Labs   Lab 11/05/22  1458 11/06/22  0352 11/07/22  0445   * 138 137   K 3.6 3.4* 3.8   CL 98 102 102   CO2 28 26 26   * 108 135*   BUN 8 7 7   CREATININE 0.6 0.7 0.7   CALCIUM 9.0 8.3* 8.5*   PROT 7.7 6.4 6.2   ALBUMIN 3.8 2.9* 2.7*   BILITOT 0.7 0.7 0.2   ALKPHOS 121 112 100   AST 25 19 18   ALT 22 16 20   ANIONGAP 8 10 9         Significant Imaging: I have reviewed all pertinent imaging results/findings within the past 24 hours.

## 2022-11-07 NOTE — ASSESSMENT & PLAN NOTE
POD 1 s/p Left Intramedullary Nailing of left hip with Dr. Sauceda  Continue to follow Orthopedic recommendations.  Needs aggressive incentive spirometry.  Follow hemoglobin and hematocrit closely.  Pain control with IV narcotics and antiemetics as needed.  Physical therapy and Occupational Therapy as per Orthopedics protocol with fall precautions.  ASA 81 mg PO BID for DVT prophylaxis per orthopedic recommendations.

## 2022-11-07 NOTE — PROGRESS NOTES
[facility]  Orthopedics  Progress Note    Patient Name: Socorro Willis  MRN: [id number]  Admission Date: 11/5/2022  Hospital Length of Stay: 2 days  Attending Provider: Sourav Estrella MD  Primary Care Provider: López Adrian MD  Follow-up For: Procedure(s) (LRB):  ORIF, FRACTURE, FEMUR, INTERTROCHANTERIC (Left)    Post-Operative Day: 1 Day Post-Op  Subjective:     Principal Problem:Fracture of femoral neck, left    Interval History:  Just finished physical therapy.  Doing well.  Resting comfortably in the bed    Review of patient's allergies indicates:  No Known Allergies    Current Facility-Administered Medications   Medication    acetaminophen tablet 650 mg    aspirin EC tablet 81 mg    folic acid tablet 1 mg    hydrALAZINE injection 10 mg    HYDROcodone-acetaminophen 5-325 mg per tablet 1 tablet    ketorolac injection 15 mg    loperamide capsule 2 mg    LORazepam injection 1 mg    magnesium oxide tablet 800 mg    magnesium oxide tablet 800 mg    morphine injection 4 mg    mupirocin 2 % ointment    naloxone 0.4 mg/mL injection 0.02 mg    nicotine 21 mg/24 hr 1 patch    ondansetron injection 4 mg    potassium bicarbonate disintegrating tablet 35 mEq    potassium bicarbonate disintegrating tablet 50 mEq    potassium bicarbonate disintegrating tablet 60 mEq    potassium, sodium phosphates 280-160-250 mg packet 2 packet    potassium, sodium phosphates 280-160-250 mg packet 2 packet    potassium, sodium phosphates 280-160-250 mg packet 2 packet    senna-docusate 8.6-50 mg per tablet 1 tablet    sodium chloride 0.9% flush 10 mL    sodium chloride 0.9% flush 10 mL    thiamine tablet 100 mg     Objective:     Vital Signs (Most Recent):  Temp: 98.1 °F (36.7 °C) (11/07/22 1201)  Pulse: 67 (11/07/22 1201)  Resp: 18 (11/07/22 1201)  BP: (!) 179/75 (11/07/22 1201)  SpO2: 100 % (11/07/22 1201)   Vital Signs (24h Range):  Temp:  [97.2 °F (36.2 °C)-98.7 °F (37.1 °C)] 98.1 °F (36.7  "°C)  Pulse:  [56-88] 67  Resp:  [12-23] 18  SpO2:  [92 %-100 %] 100 %  BP: (120-208)/() 179/75     Weight: 79.4 kg (175 lb 0.7 oz)  Height: 5' 3" (160 cm)  Body mass index is 31.01 kg/m².      Intake/Output Summary (Last 24 hours) at 11/7/2022 1227  Last data filed at 11/7/2022 0946  Gross per 24 hour   Intake 2535.8 ml   Output 2700 ml   Net -164.2 ml       General    Nursing note and vitals reviewed.  Constitutional: She is oriented to person, place, and time. She appears well-developed and well-nourished. No distress.   HENT:   Head: Atraumatic.   Eyes: EOM are normal.   Cardiovascular:  Normal rate.            Pulmonary/Chest: Effort normal.   Abdominal: Soft.   Neurological: She is alert and oriented to person, place, and time.   Psychiatric: Her behavior is normal.             Right Hip Exam   Right hip exam is normal.   Left Hip Exam     Inspection   Swelling: present  Erythema: absent    Tenderness   The patient tender to palpation of the trochanteric bursa.    Range of Motion   Extension:  abnormal   Flexion:  abnormal     Tests   Log Roll: positive    Other   Sensation: normal    Comments:  Dsg c/d/i          Vascular Exam       Left Pulses  Dorsalis Pedis:      2+          Significant Labs: CBC:   Recent Labs   Lab 11/05/22  1458 11/06/22  0352 11/07/22  0445   WBC 11.69 8.60 11.79   HGB 13.9 12.7 11.5*   HCT 42.1 39.9 36.2*    208 211     CMP:   Recent Labs   Lab 11/05/22  1458 11/06/22  0352 11/07/22  0445   * 138 137   K 3.6 3.4* 3.8   CL 98 102 102   CO2 28 26 26   * 108 135*   BUN 8 7 7   CREATININE 0.6 0.7 0.7   CALCIUM 9.0 8.3* 8.5*   PROT 7.7 6.4 6.2   ALBUMIN 3.8 2.9* 2.7*   BILITOT 0.7 0.7 0.2   ALKPHOS 121 112 100   AST 25 19 18   ALT 22 16 20   ANIONGAP 8 10 9     Coagulation:   Recent Labs   Lab 11/05/22  1458   INR 1.0     All pertinent labs within the past 24 hours have been reviewed.    Significant Imaging: None    Assessment/Plan:     * Fracture of femoral neck, " left  Continue with PT.  Partial weight-bearing left lower extremity.    Pain control.    VTE prophylaxis.    Placement.            Mango Sauceda MD  Orthopedics  [facility]

## 2022-11-07 NOTE — PLAN OF CARE
Problem: Occupational Therapy  Goal: Occupational Therapy Goal  Description: Goals to be met by: 11/21/2022     Patient will increase functional independence with ADLs by performing:    UE Dressing with Modified [place].  LE Dressing with Minimal Assistance.  Grooming with Modified [place].  Toileting from toilet with Modified [place] for hygiene and clothing management.   Toilet transfer to toilet with Modified [place].    Outcome: Ongoing, Progressing     OT evaluation completed. POC established.

## 2022-11-07 NOTE — PLAN OF CARE
POC discussed with patient, verbalized understanding. IV to right ac remains intact and patent and saline locked. IV abx admin as ordered. Safety measures maintained with side rails up and slip resistant socks on, call light in reach, pt instructed to call for needs.

## 2022-11-07 NOTE — SUBJECTIVE & OBJECTIVE
"Principal Problem:Fracture of femoral neck, left    Interval History:  Just finished physical therapy.  Doing well.  Resting comfortably in the bed    Review of patient's allergies indicates:  No Known Allergies    Current Facility-Administered Medications   Medication    acetaminophen tablet 650 mg    aspirin EC tablet 81 mg    folic acid tablet 1 mg    hydrALAZINE injection 10 mg    HYDROcodone-acetaminophen 5-325 mg per tablet 1 tablet    ketorolac injection 15 mg    loperamide capsule 2 mg    LORazepam injection 1 mg    magnesium oxide tablet 800 mg    magnesium oxide tablet 800 mg    morphine injection 4 mg    mupirocin 2 % ointment    naloxone 0.4 mg/mL injection 0.02 mg    nicotine 21 mg/24 hr 1 patch    ondansetron injection 4 mg    potassium bicarbonate disintegrating tablet 35 mEq    potassium bicarbonate disintegrating tablet 50 mEq    potassium bicarbonate disintegrating tablet 60 mEq    potassium, sodium phosphates 280-160-250 mg packet 2 packet    potassium, sodium phosphates 280-160-250 mg packet 2 packet    potassium, sodium phosphates 280-160-250 mg packet 2 packet    senna-docusate 8.6-50 mg per tablet 1 tablet    sodium chloride 0.9% flush 10 mL    sodium chloride 0.9% flush 10 mL    thiamine tablet 100 mg     Objective:     Vital Signs (Most Recent):  Temp: 98.1 °F (36.7 °C) (11/07/22 1201)  Pulse: 67 (11/07/22 1201)  Resp: 18 (11/07/22 1201)  BP: (!) 179/75 (11/07/22 1201)  SpO2: 100 % (11/07/22 1201)   Vital Signs (24h Range):  Temp:  [97.2 °F (36.2 °C)-98.7 °F (37.1 °C)] 98.1 °F (36.7 °C)  Pulse:  [56-88] 67  Resp:  [12-23] 18  SpO2:  [92 %-100 %] 100 %  BP: (120-208)/() 179/75     Weight: 79.4 kg (175 lb 0.7 oz)  Height: 5' 3" (160 cm)  Body mass index is 31.01 kg/m².      Intake/Output Summary (Last 24 hours) at 11/7/2022 1227  Last data filed at 11/7/2022 0946  Gross per 24 hour   Intake 2535.8 ml   Output 2700 ml   Net -164.2 ml       General    Nursing note and vitals " reviewed.  Constitutional: She is oriented to person, place, and time. She appears well-developed and well-nourished. No distress.   HENT:   Head: Atraumatic.   Eyes: EOM are normal.   Cardiovascular:  Normal rate.            Pulmonary/Chest: Effort normal.   Abdominal: Soft.   Neurological: She is alert and oriented to person, place, and time.   Psychiatric: Her behavior is normal.             Right Hip Exam   Right hip exam is normal.   Left Hip Exam     Inspection   Swelling: present  Erythema: absent    Tenderness   The patient tender to palpation of the trochanteric bursa.    Range of Motion   Extension:  abnormal   Flexion:  abnormal     Tests   Log Roll: positive    Other   Sensation: normal    Comments:  Dsg c/d/i          Vascular Exam       Left Pulses  Dorsalis Pedis:      2+          Significant Labs: CBC:   Recent Labs   Lab 11/05/22  1458 11/06/22  0352 11/07/22  0445   WBC 11.69 8.60 11.79   HGB 13.9 12.7 11.5*   HCT 42.1 39.9 36.2*    208 211     CMP:   Recent Labs   Lab 11/05/22  1458 11/06/22  0352 11/07/22  0445   * 138 137   K 3.6 3.4* 3.8   CL 98 102 102   CO2 28 26 26   * 108 135*   BUN 8 7 7   CREATININE 0.6 0.7 0.7   CALCIUM 9.0 8.3* 8.5*   PROT 7.7 6.4 6.2   ALBUMIN 3.8 2.9* 2.7*   BILITOT 0.7 0.7 0.2   ALKPHOS 121 112 100   AST 25 19 18   ALT 22 16 20   ANIONGAP 8 10 9     Coagulation:   Recent Labs   Lab 11/05/22  1458   INR 1.0     All pertinent labs within the past 24 hours have been reviewed.    Significant Imaging: None

## 2022-11-07 NOTE — PT/OT/SLP EVAL
"Physical Therapy Evaluation    Patient Name:  Socorro Willis   MRN:  [id number]    Recommendations:     Discharge Recommendations:  home, outpatient PT   Discharge Equipment Recommendations: walker, rolling   Barriers to discharge: None    Assessment:     Socorro Willis is a 47 y.o. female admitted with a medical diagnosis of Fracture of femoral neck, left.  She presents with the following impairments/functional limitations:  weakness, impaired endurance, impaired balance, gait instability, impaired functional mobility, decreased lower extremity function, orthopedic precautions, pain  .    Rehab Prognosis: Good; patient would benefit from acute skilled PT services to address these deficits and reach maximum level of function.    Recent Surgery: Procedure(s) (LRB):  ORIF, FRACTURE, FEMUR, INTERTROCHANTERIC (Left) 1 Day Post-Op    Plan:     During this hospitalization, patient to be seen BID to address the identified rehab impairments via gait training, therapeutic activities, therapeutic exercises and progress toward the following goals:    Plan of Care Expires:  11/15/22    Subjective     Patient/Family Comments/goals: agrees to work with PT; wants to go home with outpatient PT  Pain/Comfort:  Pain Rating 1: 5/10  Location - Side 1: Left  Location 1: hip  Pain Addressed 1: Reposition, Cessation of Activity, Pre-medicate for activity  Pain Rating Post-Intervention 1: 5/10    Living Environment:  "Camper" with 2 steps (bilateral rails) to enter  Prior to admission, patients level of function was independent without AD.  Equipment used at home: none.  DME owned (not currently used): none.  Upon discharge, patient will have assistance from family.    Objective:     Communicated with nurse (Vikki) prior to session.  Patient found supine with bed alarm, laboy catheter  upon PT entry to room.    General Precautions: Standard, fall   Orthopedic Precautions:LLE partial weight bearing   Braces: N/A  Respiratory Status: Room " air    Exams:  LLE ROM: WFL except hip mod limited due to pain  LLE Strength: HIP: 2-/5; KNEE exten: 3/5    Functional Mobility training  Bed Mobility:     Rolling Right: moderate assistance  Supine to Sit: moderate assistance  Sit to Supine: moderate assistance and for BLEs  Transfers:     Sit to Stand:  minimum assistance and moderate assistance with rolling walker and x  2 reps  Gait: 12' x 1, 20' x 1 with RW with CGA  PWB LLE      AM-PAC 6 CLICK MOBILITY  Total Score:16       Treatment & Education:  Mobility training as above with cues for technique  Instructed PWB LLE (max 50% WB)  SEATED: LAQ x 5 reps;  hip flexion x 3 reps;  ankle DF/PF x 10 reps  SUPINE: SLR (L with assist) x 5 reps each; ankle DF/PF (rec'd to perform frequently to prevent DVTs)        Patient left up in chair with all lines intact, call button in reach, and chair alarm on.    GOALS:   Multidisciplinary Problems       Physical Therapy Goals          Problem: Physical Therapy    Goal Priority Disciplines Outcome Goal Variances Interventions   Physical Therapy Goal     PT, PT/OT Ongoing, Progressing     Description: Goals to be met by: 11/15/2022     Patient will increase functional independence with mobility by performin). Supine to sit with Modified [place]  2). Sit to supine with Modified [place]  3). Sit to stand transfer with Modified [place]  4). Gait  x > 100 feet with Modified [place] using Rolling Walker.                          History:     Past Medical History:   Diagnosis Date    Back injury     Sciatic nerve pain        Past Surgical History:   Procedure Laterality Date    TUBAL LIGATION         Time Tracking:     PT Received On: 22  PT Start Time: 839     PT Stop Time: 15  PT Total Time (min): 36 min     Billable Minutes: Evaluation 15 and Gait Training 15      2022

## 2022-11-07 NOTE — PT/OT/SLP EVAL
Occupational Therapy   Evaluation    Name: Socorro Willis  MRN: [id number]  Admitting Diagnosis:  Fracture of femoral neck, left  Recent Surgery: Procedure(s) (LRB):  ORIF, FRACTURE, FEMUR, INTERTROCHANTERIC (Left) 1 Day Post-Op    Recommendations:     Discharge Recommendations: outpatient PT  Discharge Equipment Recommendations:  None  Barriers to discharge:  None    Assessment:     Socorro Willis is a 47 y.o. female with a medical diagnosis of Fracture of femoral neck, left.  She presents with performance deficits affecting function: impaired endurance, impaired self care skills, impaired functional mobility, gait instability, decreased lower extremity function and orthopedic precautions. Pt was agreeable to participate in OT. Pt reported 7/10 pain in her L hip. Nurse notified. Pt requires mod A with bed mobility including scooting and supine <> sit. Pt requires min A and RW with sit <> stand transfer and bed <> BSC transfer with verbal cues for L LE PWB. Pt requires total A with LBD and toileting secondary to L LE PWB and L hip pain.     Rehab Prognosis: Good; patient would benefit from acute skilled OT services to address these deficits and reach maximum level of function.       Plan:     Patient to be seen 5 x/week to address the above listed problems via self-care/home management, therapeutic activities, therapeutic exercises  Plan of Care Expires: 11/21/22  Plan of Care Reviewed with: patient    Subjective     Chief Complaint: L hip pain  Patient/Family Comments/goals: Pain relief    Occupational Profile:  Living Environment: Pt lives in a camper with 3 steps to enter.  Previous level of function: Independent  Roles and Routines: Employed  Equipment Used at Home:  none  Assistance upon Discharge: Pt will have assistance from her family.     Pain/Comfort:  Pain Rating 1: 7/10  Location - Side 1: Left  Location 1: hip  Pain Addressed 1: Nurse notified    Patients cultural, spiritual, Gnosticist conflicts given the  current situation: yes    Objective:     Communicated with: nurse prior to session.  Patient found HOB elevated with peripheral IV upon OT entry to room.    General Precautions: Standard, fall   Orthopedic Precautions: L LE partial weight bearing   Braces: N/A  Respiratory Status: Room air    Occupational Performance:    Bed Mobility:    Patient completed Scooting/Bridging with moderate assistance  Patient completed Supine to Sit with moderate assistance  Patient completed Sit to Supine with moderate assistance    Functional Mobility/Transfers:  Patient completed Sit <> Stand Transfer with minimum assistance with rolling walker   Patient completed Chair <> BSC Transfer Stand Pivot technique with minimum assistance with rolling walker    Activities of Daily Living:  Feeding:  independence  Grooming: Pt requires set up A/stand by assistance to gather items to brush her teeth.  Upper Body Dressing: stand by assistance  Lower Body Dressing: total assistance  Toileting: total assistance    Cognitive/Visual Perceptual:  Cognitive/Psychosocial Skills:  -       Oriented to: Person, Place, Time, and Situation   -       Follows Commands/attention: Follows multistep commands  -       Communication: clear/fluent    Physical Exam:  Upper Extremity Range of Motion:  -       Right Upper Extremity: WFL  -       Left Upper Extremity: WFL  Upper Extremity Strength: -       Right Upper Extremity: WFL  -       Left Upper Extremity: WFL    AMPAC 6 Click ADL:  AMPAC Total Score: 16    Treatment & Education:  Pt was given education on role of OT and POC. Pt verbalized understanding.   Bed mobility: mod A  Grooming: set up A/SBA  LBD: total A to don socks  Bed <> BSC transfer: min A with RW and verbal cues for L LE PWB  Toileting: total A     Patient left HOB elevated with all lines intact, call button in reach and bed alarm on.    GOALS:   Multidisciplinary Problems       Occupational Therapy Goals          Problem: Occupational Therapy     Goal Priority Disciplines Outcome Interventions   Occupational Therapy Goal     OT, PT/OT Ongoing, Progressing    Description: Goals to be met by: 11/21/2022     Patient will increase functional independence with ADLs by performing:    UE Dressing with Modified [place].  LE Dressing with Minimal Assistance.  Grooming with Modified [place].  Toileting from toilet with Modified [place] for hygiene and clothing management.   Toilet transfer to toilet with Modified [place].                         History:     Past Medical History:   Diagnosis Date    Back injury     Sciatic nerve pain          Past Surgical History:   Procedure Laterality Date    TUBAL LIGATION         Time Tracking:     OT Date of Treatment: 11/07/22  OT Start Time: 1115  OT Stop Time: 1200  OT Total Time (min): 45 min    Billable Minutes:Evaluation 15  Self Care/Home Management 30    11/7/2022

## 2022-11-07 NOTE — PLAN OF CARE
Problem: Adult Inpatient Plan of Care  Goal: Plan of Care Review  Outcome: Ongoing, Progressing   Supine with HOB up 35. POC and medications reviewed with patient. Verbalized understanding. HL in right ac with DDI.Dsg to left hip dry and intact with out drainage No redness or swelling at site. SR up x 2. Call light in reach. Bed in lowest position with brakes locked. Will continue to monitor.   Problem: Adult Inpatient Plan of Care  Goal: Optimal Comfort and Wellbeing  Outcome: Ongoing, Progressing   Q 2 hourly rounds made and IV site, pain and position monitored through out shift. Pain meds given per MD order.   Problem: Adult Inpatient Plan of Care  Goal: Patient-Specific Goal (Individualized)  Outcome: Ongoing, Progressing   Surgical dsg to Left hip dry and intact. No drainage. Pt up and ambulated to [place] with PT today.

## 2022-11-07 NOTE — PLAN OF CARE
Problem: Physical Therapy  Goal: Physical Therapy Goal  Description: Goals to be met by: 11/15/2022     Patient will increase functional independence with mobility by performin). Supine to sit with Modified [place]  2). Sit to supine with Modified [place]  3). Sit to stand transfer with Modified [place]  4). Gait  x > 100 feet with Modified [place] using Rolling Walker.     Outcome: Ongoing, Progressing

## 2022-11-07 NOTE — PT/OT/SLP PROGRESS
Physical Therapy Treatment    Patient Name:  Socorro Willis   MRN:  [id number]    Recommendations:     Discharge Recommendations:  home, outpatient PT   Discharge Equipment Recommendations: walker, rolling   Barriers to discharge: None    Assessment:     Socorro Willis is a 47 y.o. female admitted with a medical diagnosis of Fracture of femoral neck, left.  She presents with the following impairments/functional limitations:  weakness, impaired endurance, orthopedic precautions, impaired balance, gait instability, impaired functional mobility, decreased lower extremity function, pain  .    Rehab Prognosis: Good; patient would benefit from acute skilled PT services to address these deficits and reach maximum level of function.    Recent Surgery: Procedure(s) (LRB):  ORIF, FRACTURE, FEMUR, INTERTROCHANTERIC (Left) 1 Day Post-Op    Plan:     During this hospitalization, patient to be seen BID to address the identified rehab impairments via gait training, therapeutic activities, therapeutic exercises and progress toward the following goals:    Plan of Care Expires:  11/15/22    Subjective     Chief Complaint: pain  Patient/Family Comments/goals: agrees to work with PT, wants to get back in bed  Pain/Comfort:  Pain Rating 1: 5/10  Location - Side 1: Left  Location 1: hip  Pain Addressed 1: Reposition, Cessation of Activity  Pain Rating Post-Intervention 1: 5/10      Objective:     Communicated with nurse (Vikki) prior to session.  Patient found up in chair with bed alarm, laboy catheter upon PT entry to room.     General Precautions: Standard, fall   Orthopedic Precautions:LLE partial weight bearing   Braces: N/A  Respiratory Status: Room air     Functional Mobility training:  Bed Mobility:     Sit to Supine: moderate assistance and for BLEs  Transfers:     Sit to Stand:  minimum assistance with rolling walker and x 2 reps  Gait: 25' (slow) with RW with CG/SBA PWB LLE      AM-PAC 6 CLICK MOBILITY  Turning over in bed (including  adjusting bedclothes, sheets and blankets)?: 2  Sitting down on and standing up from a chair with arms (e.g., wheelchair, bedside commode, etc.): 3  Moving from lying on back to sitting on the side of the bed?: 2  Moving to and from a bed to a chair (including a wheelchair)?: 3  Need to walk in hospital room?: 3  Climbing 3-5 steps with a railing?: 3 (est.)  Basic Mobility Total Score: 16       Treatment & Education:  Mobility training as above with cues for technique  SUPINE: SLR (L with assist), x 5 reps, heel slide x 1, attempted hip abd/add (unable)      Patient left supine with all lines intact, call button in reach, bed alarm on, and dgtr present.    GOALS:   Multidisciplinary Problems       Physical Therapy Goals          Problem: Physical Therapy    Goal Priority Disciplines Outcome Goal Variances Interventions   Physical Therapy Goal     PT, PT/OT Ongoing, Progressing     Description: Goals to be met by: 11/15/2022     Patient will increase functional independence with mobility by performin). Supine to sit with Modified [place]  2). Sit to supine with Modified [place]  3). Sit to stand transfer with Modified [place]  4). Gait  x > 100 feet with Modified [place] using Rolling Walker.                          Time Tracking:     PT Received On: 22  PT Start Time: 1045     PT Stop Time: 1100  PT Total Time (min): 15 min     Billable Minutes: Gait Training 15    Treatment Type: Treatment  PT/PTA: PT     PTA Visit Number: 0     2022

## 2022-11-07 NOTE — PROGRESS NOTES
[facility] Hospital Medicine  Progress Note    Patient Name: Socorro Willis  MRN: [id number]  Patient Class: IP- Inpatient   Admission Date: 11/5/2022  Length of Stay: 2 days  Attending Physician: Sourav Estrella MD  Primary Care Provider: López Adrian MD        Subjective:     Principal Problem:Fracture of femoral neck, left        HPI:  Ms. Willis is a 47 year old female with a history of alcohol use and 1ppd smoker who presents today following a fall last night now with left hip pain. It is severe. She has been unable to bear weight on the left leg. She denies fever, chills, N/V/D, chest pain, SOB, dizziness, or LOC. She is currently living in a camper, was drinking last night and was coming out of her camper and missed a step falling on her left side. She initially presented to an OSH and was diagnosed with Acute comminuted basicervical fracture of the femoral neck with mild varus angulation on xray. Orthopedist was consulted and recommended transfer to this facility for surgical intervention in the morning. CXR without acute consolidation, EKG NSR with no acute ischemic changes. She reports a history of heavy alcohol use and has recently began drinking again. She drinks 2 beers that 10% alcohol nightly.       Overview/Hospital Course:  No notes on file    Interval History:  Patient seen and examined.  Interim reviewed.  Has just ambulated with physical therapy in the room and is now sitting up in chair.  She is reporting her pain is currently 7/10 scale.  Nursing and Case Management at bedside for DIS charge planning discussion.  Patient prefers to go home once medically stable.  Discussed outpatient physical therapy to which patient is agreeable.  Patient reports she has good home support.    Review of Systems   Constitutional:  Negative for chills and fever.   Respiratory:  Negative for cough, shortness of breath and wheezing.    Cardiovascular:  Negative for chest pain and palpitations.    Gastrointestinal:  Negative for abdominal pain, nausea and vomiting.   Musculoskeletal:  Positive for arthralgias and gait problem.   Skin:  Negative for color change and pallor.   Neurological:  Negative for dizziness, weakness and light-headedness.   Objective:     Vital Signs (Most Recent):  Temp: 97.9 °F (36.6 °C) (11/07/22 0712)  Pulse: 73 (11/07/22 0712)  Resp: 18 (11/07/22 0745)  BP: (!) 170/82 (11/07/22 0712)  SpO2: (!) 94 % (11/07/22 0712)   Vital Signs (24h Range):  Temp:  [97.2 °F (36.2 °C)-98.7 °F (37.1 °C)] 97.9 °F (36.6 °C)  Pulse:  [56-98] 73  Resp:  [10-23] 18  SpO2:  [92 %-100 %] 94 %  BP: (120-237)/() 170/82     Weight: 79.4 kg (175 lb 0.7 oz)  Body mass index is 31.01 kg/m².    Intake/Output Summary (Last 24 hours) at 11/7/2022 1001  Last data filed at 11/7/2022 0946  Gross per 24 hour   Intake 3075.8 ml   Output 2850 ml   Net 225.8 ml        Physical Exam  Constitutional:       General: She is not in acute distress.     Appearance: She is not toxic-appearing.   Cardiovascular:      Rate and Rhythm: Normal rate and regular rhythm.      Pulses: Normal pulses.      Heart sounds: Normal heart sounds.   Pulmonary:      Effort: Pulmonary effort is normal. No respiratory distress.      Breath sounds: Normal breath sounds.   Abdominal:      General: Bowel sounds are normal. There is no distension.      Palpations: Abdomen is soft.      Tenderness: There is no abdominal tenderness. There is no guarding.   Musculoskeletal:         General: Tenderness (Left hip) present.      Comments: Left hip dressing C/D/I   Skin:     General: Skin is warm and dry.      Capillary Refill: Capillary refill takes less than 2 seconds.      Coloration: Skin is not jaundiced.      Findings: Bruising (left hip) present.   Neurological:      Mental Status: She is alert.       Significant Labs: All pertinent labs within the past 24 hours have been reviewed.  CBC:   Recent Labs   Lab 11/05/22  1458 11/06/22  0352  11/07/22  0445   WBC 11.69 8.60 11.79   HGB 13.9 12.7 11.5*   HCT 42.1 39.9 36.2*    208 211       CMP:   Recent Labs   Lab 11/05/22  1458 11/06/22  0352 11/07/22  0445   * 138 137   K 3.6 3.4* 3.8   CL 98 102 102   CO2 28 26 26   * 108 135*   BUN 8 7 7   CREATININE 0.6 0.7 0.7   CALCIUM 9.0 8.3* 8.5*   PROT 7.7 6.4 6.2   ALBUMIN 3.8 2.9* 2.7*   BILITOT 0.7 0.7 0.2   ALKPHOS 121 112 100   AST 25 19 18   ALT 22 16 20   ANIONGAP 8 10 9         Significant Imaging: I have reviewed all pertinent imaging results/findings within the past 24 hours.      Assessment/Plan:      * Fracture of femoral neck, left  POD 1 s/p Left Intramedullary Nailing of left hip with Dr. Sauceda  Continue to follow Orthopedic recommendations.  Needs aggressive incentive spirometry.  Follow hemoglobin and hematocrit closely.  Pain control with IV narcotics and antiemetics as needed.  Physical therapy and Occupational Therapy as per Orthopedics protocol with fall precautions.  ASA 81 mg PO BID for DVT prophylaxis per orthopedic recommendations.          Current every day smoker  Dangers of cigarette smoking were reviewed with patient in detail for 10 minutes and patient was encouraged to quit. Nicotine replacement options were discussed.    Nicotine patch ordered      Alcohol use disorder  CIWA scale with PRN ativan  States she's never experienced withdrawal, last drink was last night  Thiamine/folic acid replacement      Obesity (BMI 30.0-34.9)  Body mass index is 31.01 kg/m². Obesity complicates all aspects of disease management from diagnostic modalities to treatment. Weight loss encouraged and health benefits explained to patient.          VTE Risk Mitigation (From admission, onward)         Ordered     IP VTE HIGH RISK PATIENT  Once         11/05/22 1854     Place sequential compression device  Until discontinued         11/05/22 1854     Reason for No Pharmacological VTE Prophylaxis  Once        Question:  Reasons:   Answer:  Risk of Bleeding    11/05/22 4195                Discharge Planning   ALONZO:      Code Status: Full Code   Is the patient medically ready for discharge?:     Reason for patient still in hospital (select all that apply): Patient trending condition, Treatment and PT / OT recommendations  Discharge Plan A: Other (OP PT/OT)                  Elva Jameson NP  Department of Hospital Medicine   [facility]

## 2022-11-08 VITALS
HEART RATE: 85 BPM | SYSTOLIC BLOOD PRESSURE: 136 MMHG | TEMPERATURE: 99 F | OXYGEN SATURATION: 97 % | WEIGHT: 175.06 LBS | RESPIRATION RATE: 17 BRPM | HEIGHT: 63 IN | DIASTOLIC BLOOD PRESSURE: 77 MMHG | BODY MASS INDEX: 31.02 KG/M2

## 2022-11-08 LAB
ALBUMIN SERPL BCP-MCNC: 2.7 G/DL (ref 3.5–5.2)
ALP SERPL-CCNC: 100 U/L (ref 55–135)
ALT SERPL W/O P-5'-P-CCNC: 13 U/L (ref 10–44)
ANION GAP SERPL CALC-SCNC: 8 MMOL/L (ref 8–16)
AST SERPL-CCNC: 16 U/L (ref 10–40)
BASOPHILS # BLD AUTO: 0.04 K/UL (ref 0–0.2)
BASOPHILS NFR BLD: 0.4 % (ref 0–1.9)
BILIRUB SERPL-MCNC: 0.4 MG/DL (ref 0.1–1)
BUN SERPL-MCNC: 9 MG/DL (ref 6–20)
CALCIUM SERPL-MCNC: 8.7 MG/DL (ref 8.7–10.5)
CHLORIDE SERPL-SCNC: 102 MMOL/L (ref 95–110)
CO2 SERPL-SCNC: 28 MMOL/L (ref 23–29)
CREAT SERPL-MCNC: 0.7 MG/DL (ref 0.5–1.4)
DIFFERENTIAL METHOD: ABNORMAL
EOSINOPHIL # BLD AUTO: 0.3 K/UL (ref 0–0.5)
EOSINOPHIL NFR BLD: 3.4 % (ref 0–8)
ERYTHROCYTE [DISTWIDTH] IN BLOOD BY AUTOMATED COUNT: 14.5 % (ref 11.5–14.5)
EST. GFR  (NO RACE VARIABLE): >60 ML/MIN/1.73 M^2
GLUCOSE SERPL-MCNC: 96 MG/DL (ref 70–110)
HCT VFR BLD AUTO: 35.9 % (ref 37–48.5)
HGB BLD-MCNC: 11.6 G/DL (ref 12–16)
IMM GRANULOCYTES # BLD AUTO: 0.06 K/UL (ref 0–0.04)
IMM GRANULOCYTES NFR BLD AUTO: 0.7 % (ref 0–0.5)
LYMPHOCYTES # BLD AUTO: 2 K/UL (ref 1–4.8)
LYMPHOCYTES NFR BLD: 22 % (ref 18–48)
MCH RBC QN AUTO: 31.7 PG (ref 27–31)
MCHC RBC AUTO-ENTMCNC: 32.3 G/DL (ref 32–36)
MCV RBC AUTO: 98 FL (ref 82–98)
MONOCYTES # BLD AUTO: 0.8 K/UL (ref 0.3–1)
MONOCYTES NFR BLD: 8.9 % (ref 4–15)
NEUTROPHILS # BLD AUTO: 5.8 K/UL (ref 1.8–7.7)
NEUTROPHILS NFR BLD: 64.6 % (ref 38–73)
NRBC BLD-RTO: 0 /100 WBC
PLATELET # BLD AUTO: 230 K/UL (ref 150–450)
PMV BLD AUTO: 10.4 FL (ref 9.2–12.9)
POTASSIUM SERPL-SCNC: 4.2 MMOL/L (ref 3.5–5.1)
PROT SERPL-MCNC: 6.4 G/DL (ref 6–8.4)
RBC # BLD AUTO: 3.66 M/UL (ref 4–5.4)
SODIUM SERPL-SCNC: 138 MMOL/L (ref 136–145)
WBC # BLD AUTO: 8.94 K/UL (ref 3.9–12.7)

## 2022-11-08 PROCEDURE — 36415 COLL VENOUS BLD VENIPUNCTURE: CPT | Performed by: ORTHOPAEDIC SURGERY

## 2022-11-08 PROCEDURE — 25000003 PHARM REV CODE 250: Performed by: NURSE PRACTITIONER

## 2022-11-08 PROCEDURE — 25000003 PHARM REV CODE 250: Performed by: ORTHOPAEDIC SURGERY

## 2022-11-08 PROCEDURE — 94799 UNLISTED PULMONARY SVC/PX: CPT

## 2022-11-08 PROCEDURE — S4991 NICOTINE PATCH NONLEGEND: HCPCS | Performed by: ORTHOPAEDIC SURGERY

## 2022-11-08 PROCEDURE — 63600175 PHARM REV CODE 636 W HCPCS: Performed by: ORTHOPAEDIC SURGERY

## 2022-11-08 PROCEDURE — 80053 COMPREHEN METABOLIC PANEL: CPT | Performed by: ORTHOPAEDIC SURGERY

## 2022-11-08 PROCEDURE — 97110 THERAPEUTIC EXERCISES: CPT

## 2022-11-08 PROCEDURE — 85025 COMPLETE CBC W/AUTO DIFF WBC: CPT | Performed by: ORTHOPAEDIC SURGERY

## 2022-11-08 PROCEDURE — 97116 GAIT TRAINING THERAPY: CPT

## 2022-11-08 PROCEDURE — 94761 N-INVAS EAR/PLS OXIMETRY MLT: CPT

## 2022-11-08 RX ORDER — HYDROCODONE BITARTRATE AND ACETAMINOPHEN 5; 325 MG/1; MG/1
1 TABLET ORAL EVERY 4 HOURS PRN
Qty: 15 TABLET | Refills: 0 | Status: SHIPPED | OUTPATIENT
Start: 2022-11-08 | End: 2022-11-21 | Stop reason: SDUPTHER

## 2022-11-08 RX ORDER — ASPIRIN 81 MG/1
81 TABLET ORAL 2 TIMES DAILY
Qty: 60 TABLET | Refills: 0 | Status: SHIPPED | OUTPATIENT
Start: 2022-11-08 | End: 2023-03-31

## 2022-11-08 RX ADMIN — Medication 1 PATCH: at 10:11

## 2022-11-08 RX ADMIN — MORPHINE SULFATE 4 MG: 4 INJECTION INTRAVENOUS at 09:11

## 2022-11-08 RX ADMIN — ASPIRIN 81 MG: 81 TABLET, COATED ORAL at 10:11

## 2022-11-08 RX ADMIN — KETOROLAC TROMETHAMINE 15 MG: 30 INJECTION, SOLUTION INTRAMUSCULAR; INTRAVENOUS at 12:11

## 2022-11-08 RX ADMIN — HYDROCODONE BITARTRATE AND ACETAMINOPHEN 1 TABLET: 5; 325 TABLET ORAL at 05:11

## 2022-11-08 RX ADMIN — THIAMINE HCL TAB 100 MG 100 MG: 100 TAB at 10:11

## 2022-11-08 RX ADMIN — FOLIC ACID 1 MG: 1 TABLET ORAL at 10:11

## 2022-11-08 RX ADMIN — DOCUSATE SODIUM AND SENNOSIDES 1 TABLET: 8.6; 5 TABLET, FILM COATED ORAL at 10:11

## 2022-11-08 RX ADMIN — MUPIROCIN: 20 OINTMENT TOPICAL at 10:11

## 2022-11-08 NOTE — DISCHARGE INSTRUCTIONS
Please follow up with primary care physician next week and orthopedic doctor in 2 weeks.   Aspirin 81 mg by mouth twice a day is advised for post-operative anti-coagulation as per orthopedics recommendations.  In case of chest pain, shortness of breath, stroke or stroke like symptoms, high grade fever or any signs or symptoms of surgical site wound infection symptoms, please return to nearest emergency room as soon as possible.     Discharge Instructions, [facility] Medicine    Thank you for choosing [facility] for your medical care. The primary provider who is taking care of you at the time of your discharge is Elva Jameson NP    You were admitted to the hospital with Fracture of femoral neck, left.     Please note your discharge instructions, including diet/activity restrictions, follow-up appointments, and medication changes.  If you have any questions about your medical issues, prescriptions, or any other questions, please feel free to contact the [facility] Medicine Dept at [phone] and we will help.    If you are previously with Home health, outpatient PT/OT or under a therapy program, you are cleared to return to those programs.    Please direct all long term medication refills and follow up to your primary care provider, López Adrian MD  Thank you again for letting us take care of your health care needs.    Please note the following discharge instructions per your discharging provider -  Elva Jameson NP

## 2022-11-08 NOTE — PROGRESS NOTES
Pharmacist discharge counseling completed.     The patient/caregiver was educated on the purpose and major side effects of the following medications: aspirin and norco. Patient was also educated on stopping diclofenac and percocet. Patient was given handout. All questions were answered and the patient demonstrated understanding.    Amy Rodriguez, MosheD

## 2022-11-08 NOTE — PT/OT/SLP PROGRESS
Physical Therapy Treatment    Patient Name:  Socorro Willis   MRN:  [id number]    Recommendations:     Discharge Recommendations:  home, outpatient PT   Discharge Equipment Recommendations: walker, rolling   Barriers to discharge: None    Assessment:     Socorro Willis is a 47 y.o. female admitted with a medical diagnosis of Fracture of femoral neck, left.  She presents with the following impairments/functional limitations:  weakness, impaired endurance, orthopedic precautions, impaired balance, gait instability, impaired functional mobility, decreased lower extremity function  .    Rehab Prognosis: Good; patient would benefit from acute skilled PT services to address these deficits and reach maximum level of function.    Recent Surgery: Procedure(s) (LRB):  ORIF, FRACTURE, FEMUR, INTERTROCHANTERIC (Left) 2 Days Post-Op    Plan:     During this hospitalization, patient to be seen BID to address the identified rehab impairments via gait training, therapeutic activities, therapeutic exercises and progress toward the following goals:    Plan of Care Expires:  11/15/22    Subjective     Patient/Family Comments/goals: wants to get back in bed    Objective:     Communicated with nurse prior to session.  Patient found up in chair with bed alarm upon PT entry to room.     General Precautions: Standard, fall   Orthopedic Precautions:LLE partial weight bearing   Braces: N/A  Respiratory Status: Room air     Functional Mobility training:  Bed Mobility:     Sit to Supine: minimum assistance and for LEs  Transfers:     Sit to Stand:  stand by assistance with rolling walker and from chair  Gait: 40' (slow) with RW with SBA  PWB LLE      AM-PAC 6 CLICK MOBILITY  Turning over in bed (including adjusting bedclothes, sheets and blankets)?: 3  Sitting down on and standing up from a chair with arms (e.g., wheelchair, bedside commode, etc.): 4  Moving from lying on back to sitting on the side of the bed?: 3  Moving to and from a bed to a chair  (including a wheelchair)?: 4  Need to walk in hospital room?: 4  Climbing 3-5 steps with a railing?: 3 (est.)  Basic Mobility Total Score: 21       Treatment & Education:  Mobility training as above with cues for technique  Instructed PWB LLE (max 50% body wt)  Instructed technique to ascend/descend few stairs (with rail and assist only)      Patient left supine with call button in reach.    GOALS:   Multidisciplinary Problems       Physical Therapy Goals          Problem: Physical Therapy    Goal Priority Disciplines Outcome Goal Variances Interventions   Physical Therapy Goal     PT, PT/OT Ongoing, Progressing     Description: Goals to be met by: 11/15/2022     Patient will increase functional independence with mobility by performin). Supine to sit with Modified [place]  2). Sit to supine with Modified [place]  3). Sit to stand transfer with Modified [place]  4). Gait  x > 100 feet with Modified [place] using Rolling Walker.                          Time Tracking:     PT Received On: 22  PT Start Time: 1037     PT Stop Time: 1054  PT Total Time (min): 17 min     Billable Minutes: Gait Training 17    Treatment Type: Treatment  PT/PTA: PT     PTA Visit Number: 0     2022

## 2022-11-08 NOTE — PLAN OF CARE
RW delivered to pt's bedside, she signed that she received.   PCP follow up scheduled.  F/u with Dr Norris on AVS    Pt referred to outpatient PT/OT    Pt clear for DC from CM standpoint. Discharging to home       11/08/22 1203   Final Note   Assessment Type Final Discharge Note   Anticipated Discharge Disposition Home

## 2022-11-08 NOTE — PLAN OF CARE
POC disucssed with pt, pt verbalized understanding. Oriented x4. PIV cdi. Dressing to left hip cdi. Pulses 2+, no tingling or numbness. Complaints of pain to left hip, controlled with prn's. Educated on the importance of the incentive spirometer use, coughing/deep breathing, and ambulation post op. Call light in reach, bed alarm set, safety maintained. No complaints or requests at this time, will continue to monitor.

## 2022-11-08 NOTE — CARE UPDATE
11/08/22 0822   Patient Assessment/Suction   Level of Consciousness (AVPU) alert   Respiratory Effort Normal;Unlabored   All Lung Fields Breath Sounds clear   PRE-TX-O2   O2 Device (Oxygen Therapy) room air   SpO2 96 %   Pulse Oximetry Type Intermittent   $ Pulse Oximetry - Multiple Charge Pulse Oximetry - Multiple   Pulse 68   Resp 14

## 2022-11-08 NOTE — DISCHARGE SUMMARY
[facility] Hospital Medicine  Discharge Summary      Patient Name: Socorro Willis  MRN: [id number]  ROMANA: [id number]  Patient Class: IP- Inpatient  Admission Date: 11/5/2022  Hospital Length of Stay: 3 days  Discharge Date and Time: 11/8/2022  3:08 PM  Attending Physician: Sourav Estrella MD   Discharging Provider: Elva Jameson NP  Primary Care Provider: López Adrian MD    Primary Care Team: Networked reference to record PCT     HPI:   Ms. Willis is a 47 year old female with a history of alcohol use and 1ppd smoker who presents today following a fall last night now with left hip pain. It is severe. She has been unable to bear weight on the left leg. She denies fever, chills, N/V/D, chest pain, SOB, dizziness, or LOC. She is currently living in a camper, was drinking last night and was coming out of her camper and missed a step falling on her left side. She initially presented to an OSH and was diagnosed with Acute comminuted basicervical fracture of the femoral neck with mild varus angulation on xray. Orthopedist was consulted and recommended transfer to this facility for surgical intervention in the morning. CXR without acute consolidation, EKG NSR with no acute ischemic changes. She reports a history of heavy alcohol use and has recently began drinking again. She drinks 2 beers that 10% alcohol nightly.       Procedure(s) (LRB):  ORIF, FRACTURE, FEMUR, INTERTROCHANTERIC (Left)      Hospital Course:   Patient was admitted for left hip fracture S/P mechanical fall.  Patient was monitored closely during her stay.  Orthopedic surgery was consulted.  Patient underwent intramedullary nailing of left hip.  Patient participated with Physical therapy and occupational therapy postperatively.  Her pain was well controlled with p.r.n. narcotics.  Patient opted for discharge to home with outpatient physical therapy.  Discharge instructions as well as return precautions were discussed with patient with  "good understanding.    Physical exam:  Awake alert oriented x4, no acute distress  Cardiac:  RRR  Pulmonary:  Clear to auscultation  Abdomen:  Soft, nontender  Extremities: normal range of motion. Left hip dressing C/D/I, 2+ pedal pulses         Goals of Care Treatment Preferences:  Code Status: Full Code      Consults:   Consults (From admission, onward)          Status Ordering Provider     Inpatient consult to Orthopedics  Once        Provider:  Mango Sauceda MD    Completed LIZ JOHNSON            No new Assessment & Plan notes have been filed under this hospital service since the last note was generated.  Service: Hospital Medicine    Final Active Diagnoses:    Diagnosis Date Noted POA    PRINCIPAL PROBLEM:  Fracture of femoral neck, left [S72.002A] 11/05/2022 Yes    Obesity (BMI 30.0-34.9) [E66.9] 11/05/2022 Yes    Alcohol use disorder [F10.20] 11/05/2022 Yes    Current every day smoker [F17.200] 11/05/2022 Yes      Problems Resolved During this Admission:       Discharged Condition: good    Disposition: Home or Self Care    Follow Up:   Follow-up Information       Mango Sauceda MD Follow up in 2 week(s).    Specialties: Sports Medicine, Orthopedic Surgery  Contact information:  [address] [place] [address] DR Bruner [address] [place] [id number] [phone]                           Patient Instructions:      WALKER FOR HOME USE     Order Specific Question Answer Comments   Type of Walker: Adult (5'4"-6'6")    With wheels? Yes    Height: 5' 3" (1.6 m)    Weight: 79.4 kg (175 lb 0.7 oz)    Length of need (1-99 months): 99    Does patient have medical equipment at home? none    Please check all that apply: Patient's condition impairs ambulation.    Please check all that apply: Patient is unable to safely ambulate without equipment.    Please check all that apply: Patient needs help to get in and out of chair.    Please check all that apply: Walker will be used for gait training.      Ambulatory referral/consult " to Physical/Occupational Therapy   Standing Status: Future   Referral Priority: Routine Referral Type: Physical Medicine   Referral Reason: Specialty Services Required   Requested Specialty: Physical Therapy   Number of Visits Requested: 1     Diet Adult Regular     No driving until:   Order Comments: No driving until cleared by orthopedic surgery     Lifting restrictions   Order Comments: No lifting over 10-15 lbs until cleared by Orthopedic Surgery     Notify your health care provider if you experience any of the following:  temperature >100.4     Notify your health care provider if you experience any of the following:  severe uncontrolled pain     Notify your health care provider if you experience any of the following:  redness, tenderness, or signs of infection (pain, swelling, redness, odor or green/yellow discharge around incision site)     Notify your health care provider if you experience any of the following:  difficulty breathing or increased cough     Activity as tolerated       Significant Diagnostic Studies: Labs:   CMP   Recent Labs   Lab 11/07/22 0445 11/08/22 0522    138   K 3.8 4.2    102   CO2 26 28   * 96   BUN 7 9   CREATININE 0.7 0.7   CALCIUM 8.5* 8.7   PROT 6.2 6.4   ALBUMIN 2.7* 2.7*   BILITOT 0.2 0.4   ALKPHOS 100 100   AST 18 16   ALT 20 13   ANIONGAP 9 8   , CBC   Recent Labs   Lab 11/07/22 0445 11/08/22 0522   WBC 11.79 8.94   HGB 11.5* 11.6*   HCT 36.2* 35.9*    230    and All labs within the past 24 hours have been reviewed  Radiology:     Details    Reading Physician Reading Date Result Priority   Mango Lambert MD  [phone] 11/5/2022      Narrative & Impression  Reason: Pain status post fall.     FINDINGS:     AP pelvis with two views of the left hip acute basicervical fracture of the femoral neck demonstrated with mild varus angulation. Fracture appears comminuted. No dislocation. Soft tissues are unremarkable.     IMPRESSION:     Acute comminuted  basicervical fracture of the femoral neck with mild varus angulation.     Electronically signed by:  Mango Lambert DO  11/5/2022 2:11 PM CDT Workstation: [id number]           Specimen Collected: 11/05/22 13:34             Pending Diagnostic Studies:       None           Medications:  Reconciled Home Medications:      Medication List        START taking these medications      aspirin 81 MG EC tablet  Commonly known as: ECOTRIN  Take 1 tablet (81 mg total) by mouth 2 (two) times a day.     HYDROcodone-acetaminophen 5-325 mg per tablet  Commonly known as: NORCO  Take 1 tablet by mouth every 4 (four) hours as needed for Pain.            STOP taking these medications      diclofenac 50 MG EC tablet  Commonly known as: VOLTAREN     oxyCODONE-acetaminophen 5-325 mg per tablet  Commonly known as: PERCOCET              Indwelling Lines/Drains at time of discharge:   Lines/Drains/Airways       None                   Time spent on the discharge of patient: 38 minutes         Elva Jameson NP  Department of Hospital Medicine  [facility]

## 2022-11-08 NOTE — PT/OT/SLP PROGRESS
Physical Therapy Treatment    Patient Name:  Socorro Willis   MRN:  [id number]    Recommendations:     Discharge Recommendations:  home, outpatient PT   Discharge Equipment Recommendations: walker, rolling   Barriers to discharge: None    Assessment:     Socorro Willis is a 47 y.o. female admitted with a medical diagnosis of Fracture of femoral neck, left.  She presents with the following impairments/functional limitations:  weakness, impaired endurance, orthopedic precautions, impaired balance, gait instability, impaired functional mobility, decreased lower extremity function  .    Rehab Prognosis: Good; patient would benefit from acute skilled PT services to address these deficits and reach maximum level of function.    Recent Surgery: Procedure(s) (LRB):  ORIF, FRACTURE, FEMUR, INTERTROCHANTERIC (Left) 2 Days Post-Op    Plan:     During this hospitalization, patient to be seen BID to address the identified rehab impairments via gait training, therapeutic activities, therapeutic exercises and progress toward the following goals:    Plan of Care Expires:  11/15/22    Subjective     Patient/Family Comments/goals: agrees to work with PT    Objective:     Communicated with nurse prior to session.  Patient found supine with bed alarm upon PT entry to room.     General Precautions: Standard, fall   Orthopedic Precautions:LLE partial weight bearing   Braces: N/A  Respiratory Status: Room air     Functional Mobility training:  Bed Mobility:     Rolling Right: stand by assistance  Supine to Sit: stand by assistance and using RLE to assist LLE  Transfers:     Sit to Stand:  contact guard assistance with rolling walker and x 2 reps  Gait: 35' (slow) x 2 with RW with CG/SBA PWB LLE      AM-PAC 6 CLICK MOBILITY  Turning over in bed (including adjusting bedclothes, sheets and blankets)?: 3  Sitting down on and standing up from a chair with arms (e.g., wheelchair, bedside commode, etc.): 3  Moving from lying on back to sitting on the  side of the bed?: 3  Moving to and from a bed to a chair (including a wheelchair)?: 3  Need to walk in hospital room?: 3  Climbing 3-5 steps with a railing?: 3 (est.)  Basic Mobility Total Score: 18       Treatment & Education:  Mobility training as above with cues for technique  SUPINE: SLR (L with assist), heel slides, x 5 reps each; ankle DF/PF x 10 reps  SEATED: LAQ, hip flexion x 5 reps each; ankle DF/PF x 10 reps (rec'd to perform frequently)  Described technique for car transfer, rec'd no high vehicles.      Patient left up in chair with all lines intact and call button in reach.    GOALS:   Multidisciplinary Problems       Physical Therapy Goals          Problem: Physical Therapy    Goal Priority Disciplines Outcome Goal Variances Interventions   Physical Therapy Goal     PT, PT/OT Ongoing, Progressing     Description: Goals to be met by: 11/15/2022     Patient will increase functional independence with mobility by performin). Supine to sit with Modified [place]  2). Sit to supine with Modified [place]  3). Sit to stand transfer with Modified [place]  4). Gait  x > 100 feet with Modified [place] using Rolling Walker.                          Time Tracking:     PT Received On: 22  PT Start Time: 904     PT Stop Time: 932  PT Total Time (min): 28 min     Billable Minutes: Gait Training 20 and Therapeutic Exercise 8    Treatment Type: Treatment  PT/PTA: PT     PTA Visit Number: 0     2022

## 2022-11-08 NOTE — PLAN OF CARE
11/07/22 1952   Patient Assessment/Suction   Level of Consciousness (AVPU) alert   Rhythm/Pattern, Respiratory pattern regular   PRE-TX-O2   O2 Device (Oxygen Therapy) room air   SpO2 96 %   Pulse Oximetry Type Intermittent

## 2022-11-08 NOTE — PLAN OF CARE
Ok to pull RW per Mason Guzman       11/08/22 1100   Post-Acute Status   Post-Acute Authorization HME   HME Status (!) Pending Delivery

## 2022-11-09 NOTE — ANESTHESIA POSTPROCEDURE EVALUATION
Anesthesia Post Evaluation    Patient: Socorro Willis    Procedure(s) Performed: Procedure(s) (LRB):  ORIF, FRACTURE, FEMUR, INTERTROCHANTERIC (Left)    Final Anesthesia Type: general      Patient location during evaluation: PACU  Patient participation: Yes- Able to Participate  Level of consciousness: awake and alert and oriented  Post-procedure vital signs: reviewed and stable  Pain management: adequate  Airway patency: patent    PONV status at discharge: No PONV  Anesthetic complications: no      Cardiovascular status: blood pressure returned to baseline and stable  Respiratory status: unassisted and spontaneous ventilation  Hydration status: euvolemic  Follow-up not needed.          Vitals Value Taken Time   /77 11/08/22 1110   Temp 37.2 °C (98.9 °F) 11/08/22 1110   Pulse 85 11/08/22 1125   Resp 17 11/08/22 1125   SpO2 97 % 11/08/22 1125         Event Time   Out of Recovery 11/06/2022 13:20:00         Pain/Jayro Score: Pain Rating Prior to Med Admin: 8 (11/8/2022  9:59 AM)  Pain Rating Post Med Admin: 3 (11/8/2022 10:50 AM)

## 2022-11-17 DIAGNOSIS — S72.002A CLOSED FRACTURE OF NECK OF LEFT FEMUR, INITIAL ENCOUNTER: Primary | ICD-10-CM

## 2022-11-18 DIAGNOSIS — S72.002A CLOSED FRACTURE OF NECK OF LEFT FEMUR, INITIAL ENCOUNTER: Primary | ICD-10-CM

## 2022-11-21 ENCOUNTER — HOSPITAL ENCOUNTER (OUTPATIENT)
Dept: RADIOLOGY | Facility: HOSPITAL | Age: 47
Discharge: HOME | End: 2022-11-21
Attending: ORTHOPAEDIC SURGERY
Payer: MEDICAID

## 2022-11-21 ENCOUNTER — OFFICE VISIT (OUTPATIENT)
Dept: ORTHOPEDICS | Facility: CLINIC | Age: 47
End: 2022-11-21
Payer: MEDICAID

## 2022-11-21 VITALS — BODY MASS INDEX: 31.01 KG/M2 | WEIGHT: 175 LBS | HEIGHT: 63 IN | RESPIRATION RATE: 18 BRPM

## 2022-11-21 DIAGNOSIS — S72.002A CLOSED FRACTURE OF NECK OF LEFT FEMUR, INITIAL ENCOUNTER: ICD-10-CM

## 2022-11-21 DIAGNOSIS — S72.002A CLOSED FRACTURE OF NECK OF LEFT FEMUR, INITIAL ENCOUNTER: Primary | ICD-10-CM

## 2022-11-21 PROCEDURE — 1159F PR MEDICATION LIST DOCUMENTED IN MEDICAL RECORD: ICD-10-PCS | Mod: CPTII,,, | Performed by: ORTHOPAEDIC SURGERY

## 2022-11-21 PROCEDURE — 99999 PR PBB SHADOW E&M-EST. PATIENT-LVL II: ICD-10-PCS | Mod: PBBFAC,,, | Performed by: ORTHOPAEDIC SURGERY

## 2022-11-21 PROCEDURE — 3008F PR BODY MASS INDEX (BMI) DOCUMENTED: ICD-10-PCS | Mod: CPTII,,, | Performed by: ORTHOPAEDIC SURGERY

## 2022-11-21 PROCEDURE — 99024 PR POST-OP FOLLOW-UP VISIT: ICD-10-PCS | Mod: ,,, | Performed by: ORTHOPAEDIC SURGERY

## 2022-11-21 PROCEDURE — 73552 XR FEMUR 2 VIEW LEFT: ICD-10-PCS | Mod: 26,LT,, | Performed by: RADIOLOGY

## 2022-11-21 PROCEDURE — 99999 PR PBB SHADOW E&M-EST. PATIENT-LVL II: CPT | Mod: PBBFAC,,, | Performed by: ORTHOPAEDIC SURGERY

## 2022-11-21 PROCEDURE — 73552 X-RAY EXAM OF FEMUR 2/>: CPT | Mod: TC,PN,LT

## 2022-11-21 PROCEDURE — 99212 OFFICE O/P EST SF 10 MIN: CPT | Mod: PBBFAC,PN | Performed by: ORTHOPAEDIC SURGERY

## 2022-11-21 PROCEDURE — 1159F MED LIST DOCD IN RCRD: CPT | Mod: CPTII,,, | Performed by: ORTHOPAEDIC SURGERY

## 2022-11-21 PROCEDURE — 1160F RVW MEDS BY RX/DR IN RCRD: CPT | Mod: CPTII,,, | Performed by: ORTHOPAEDIC SURGERY

## 2022-11-21 PROCEDURE — 3008F BODY MASS INDEX DOCD: CPT | Mod: CPTII,,, | Performed by: ORTHOPAEDIC SURGERY

## 2022-11-21 PROCEDURE — 73552 X-RAY EXAM OF FEMUR 2/>: CPT | Mod: 26,LT,, | Performed by: RADIOLOGY

## 2022-11-21 PROCEDURE — 1160F PR REVIEW ALL MEDS BY PRESCRIBER/CLIN PHARMACIST DOCUMENTED: ICD-10-PCS | Mod: CPTII,,, | Performed by: ORTHOPAEDIC SURGERY

## 2022-11-21 PROCEDURE — 99024 POSTOP FOLLOW-UP VISIT: CPT | Mod: ,,, | Performed by: ORTHOPAEDIC SURGERY

## 2022-11-21 RX ORDER — HYDROCODONE BITARTRATE AND ACETAMINOPHEN 5; 325 MG/1; MG/1
1 TABLET ORAL EVERY 4 HOURS PRN
Qty: 28 TABLET | Refills: 0 | Status: SHIPPED | OUTPATIENT
Start: 2022-11-21 | End: 2022-12-05 | Stop reason: SDUPTHER

## 2022-11-21 NOTE — PROGRESS NOTES
Past Medical History:   Diagnosis Date    Back injury     Sciatic nerve pain        Past Surgical History:   Procedure Laterality Date    OPEN REDUCTION AND INTERNAL FIXATION (ORIF) OF INTERTROCHANTERIC FRACTURE OF FEMUR Left 11/6/2022    Procedure: ORIF, FRACTURE, FEMUR, INTERTROCHANTERIC;  Surgeon: Mango Sauceda MD;  Location: [facility];  Service: Orthopedics;  Laterality: Left;    TUBAL LIGATION         Current Outpatient Medications   Medication Sig    aspirin (ECOTRIN) 81 MG EC tablet Take 1 tablet (81 mg total) by mouth 2 (two) times a day.    HYDROcodone-acetaminophen (NORCO) 5-325 mg per tablet Take 1 tablet by mouth every 4 (four) hours as needed for Pain.     No current facility-administered medications for this visit.       Review of patient's allergies indicates:  No Known Allergies    Family History   Problem Relation Age of Onset    Heart disease Father        Social History     Socioeconomic History    Marital status:    Tobacco Use    Smoking status: Every Day     Packs/day: 1.00     Types: Cigarettes    Smokeless tobacco: Never   Substance and Sexual Activity    Alcohol use: Yes     Alcohol/week: 14.0 standard drinks     Types: 14 Cans of beer per week    Drug use: Not Currently     Social Determinants of Health     Financial Resource Strain: Unknown    Difficulty of Paying Living Expenses: Patient refused   Food Insecurity: Unknown    Worried About Running Out of Food in the Last Year: Patient refused    Ran Out of Food in the Last Year: Patient refused   Transportation Needs: Unknown    Lack of Transportation (Medical): Patient refused    Lack of Transportation (Non-Medical): Patient refused   Physical Activity: Unknown    Days of Exercise per Week: Patient refused    Minutes of Exercise per Session: Patient refused   Stress: Unknown    Feeling of Stress : Patient refused   Social Connections: Unknown    Frequency of Communication with Friends and Family: Patient refused     Frequency of Social Gatherings with Friends and Family: Patient refused    Attends Church Services: Patient refused    Active Member of Clubs or Organizations: Patient refused    Attends Club or Organization Meetings: Patient refused    Marital Status: Patient refused   Housing Stability: Unknown    Unable to Pay for Housing in the Last Year: Patient refused    Unstable Housing in the Last Year: Patient refused       Chief Complaint: No chief complaint on file.      Date of surgery:  November 6, 2022    History of present illness:  47-year-old female seen after IM nailing of a left intertrochanteric femur fracture.  She is doing pretty well.  Has a fair amount of pain.  Pain is an 8/10.  Worse at night.      Review of Systems:    Musculoskeletal:  See HPI        Physical Examination:    Vital Signs:  There were no vitals filed for this visit.    There is no height or weight on file to calculate BMI.    This a well-developed, well nourished patient in no acute distress.  They are alert and oriented and cooperative to examination.  Pt. walks with a mild antalgic gait using crutches.    Examination left hip shows well-healing surgical incisions.  No erythema drainage.  Patient is neurovascularly intact.  Decent range of motion of the hip.    X-rays:  X-rays left femur ordered and reviewed which show well-aligned intertrochanteric femur fracture with robin and screws in appropriate alignment     Assessment::  Status post IM nailing of left intertrochanteric femur fracture    Plan:  Reviewed the findings with her today.  Continue weight-bearing using crutches.  Start physical therapy.  Okay to get the incisions wet.  Follow-up in 4 weeks with another x-ray of the left hip.    This note was created using bTendo voice recognition software that occasionally misinterpreted phrases or words.

## 2022-11-22 ENCOUNTER — CLINICAL SUPPORT (OUTPATIENT)
Dept: REHABILITATION | Facility: HOSPITAL | Age: 47
End: 2022-11-22
Attending: NURSE PRACTITIONER
Payer: MEDICAID

## 2022-11-22 DIAGNOSIS — R26.9 GAIT ABNORMALITY: ICD-10-CM

## 2022-11-22 DIAGNOSIS — M25.662 DECREASED RANGE OF MOTION OF LEFT LOWER EXTREMITY: ICD-10-CM

## 2022-11-22 DIAGNOSIS — S72.012A: ICD-10-CM

## 2022-11-22 DIAGNOSIS — R29.898 WEAKNESS OF LEFT LOWER EXTREMITY: ICD-10-CM

## 2022-11-22 DIAGNOSIS — R29.898 IMPAIRED FLEXIBILITY OF LOWER EXTREMITY: ICD-10-CM

## 2022-11-22 PROBLEM — M25.669 DECREASED ROM OF LOWER EXTREMITY: Status: ACTIVE | Noted: 2022-11-22

## 2022-11-22 PROCEDURE — 97161 PT EVAL LOW COMPLEX 20 MIN: CPT | Mod: PN | Performed by: PHYSICAL THERAPIST

## 2022-11-22 NOTE — PLAN OF CARE
"[facility]   Physical Therapy Initial Evaluation     Date: 11/22/2022   Name: Socorro Willis  [facility] Number: [id number]    Therapy Diagnosis:   Encounter Diagnoses   Name Primary?    Closed subcapital fracture of neck of left femur, initial encounter     Gait abnormality     Weakness of left lower extremity     Impaired flexibility of lower extremity     Decreased range of motion of left lower extremity      Physician: Elva Aguilar NP, Mango Sauceda MD    Physician Orders: PT Eval and Treat Weight bearing status 50% per Dr. Sauceda  Medical Diagnosis from Referral: Closed fracture of neck of left femur, initial encounter  Evaluation Date: 11/22/2022  Authorization Period Expiration: 12/31/2022  Plan of Care Expiration: 2/3/2023  Visit # / Visits authorized: 1/ 1   (POC 1/16)   FOTO Due visit 5  FOTO Due visit 10    Precautions: Standard, Weightbearing, and 50% weight bearing  Insurance: Payor: MEDICAID / [place]: [place] [employer] [place] / Product Type: Managed Medicaid /     Time In: 1400  Time Out: 1500  Total Appointment Time (timed & untimed codes): 60 minutes      SUBJECTIVE   Date of onset: Date of surgery: 11/6/2022    History of current condition - Socorro reports: she has fallen twice over the past six months. First was in May when she slipped on a wet surface and her lower extremity went out to the side. The second fall occurred in September, this time landing on her left knee. She went to the ED on 11/5/2022 with inability to bear weight on her left lower extremity. She was referred to orthopedics and an ORIF of the left femoral neck fracture on 11/6/2022. She was discharge home on 11/8/2022 ambulating partial weight bearing on the left lower extremity using a FWW. She presents to PT ambulating partial weight bearing on the left lower extremity using axillary crutches.     Falls: 2    Imaging, X-rays: Findings- "There has been ORIF of the left proximal femur with a short " "antegrade intramedullary robin with femoral neck screw transfixing a femoral neck fracture.  Hardware components are intact and engaged.  Good alignment is present.  Distal femur is intact and located."    Prior Therapy: Acute care  Social History: Single lives with their family & significant other  Occupation: Not working  Prior Level of Function: Independent  Current Level of Function: Decreased ability to perform ADL and limited tolerance to standing and walking.    Pain:  Current 5/10, worst 9/10, best 3/10   Location: left hip    Description: Sharp and pins and needles  Aggravating Factors: Sitting and Bending  Easing Factors: pain medication    Patients goals: Increase standing ambulation tolerance      Medical History:   Past Medical History:   Diagnosis Date    Back injury     Sciatic nerve pain        Surgical History:   Socorro Willis  has a past surgical history that includes Tubal ligation and Open reduction and internal fixation (ORIF) of intertrochanteric fracture of femur (Left, 11/6/2022).    Medications:   Socorro has a current medication list which includes the following prescription(s): aspirin and hydrocodone-acetaminophen.    Allergies:   Review of patient's allergies indicates:  No Known Allergies       OBJECTIVE     Posture: Decreased lumbar lordosis and forward head in sitting  Palpation: Moderate point tenderness noted with palpation of the left hip  Sensation: Intact    Range of Motion/Strength:     LE ROM Left Right   Hip flexion 92 degrees 120 degrees   Hip abduction 28 degrees 40 degrees   Hip external rotation 20 degrees 35 degrees   Hip internal rotation 18 degrees 30 degrees   Knee flexion 130 degrees 130 degrees   Knee extension 0 degrees 0 degrees       LE MMT Left Right   Hip flexion 3+/5 5/5   Hip abduction 3+/5 5/5   Hip adduction 3+/5 5/5   Hip external rotation 3+/5 5/5   Hip Internal rotation 3+/5 5/5   Knee extension 5/5 5/5   Knee flexion 5/5 5/5       Flexibility Left Right "   Hamstrings 42 degrees 68 degrees   Achilles 0 degrees 0 degrees       Special Tests:  Left Right   Dunia's sign negative. negative.       Gait With AD.  Device Used -  Axillary crutches   Analysis The patient ambulates partial weight bearing with axillary crutches. She decreased weight shifting to the left.       Limitation/Restriction for FOTO  Survey    Therapist reviewed FOTO scores for Socorro Willis on 11/22/2022.   FOTO documents entered into [employer] - see Media section.    Limitation Score: 63%         TREATMENT     Total Treatment time (time-based codes) separate from Evaluation: 30 minutes      Socorro received the treatments listed below:      THERAPEUTIC EXERCISES to develop strength, ROM, and flexibility for 30 minutes including :     Seated Hamstring stretch 3 x 30 sec B  Seated Gastroc-soleus stretch 3 x 30 sec B  Seated adductor squeeze 3 x 10  Seated hip abduction 3 x 10 with Green theraband   Standing hip abduction 3 x 10  Standing hip extension 3 x 10  Standing marching (left only) 3 x 10    Future:  Stationary Bike  Bridging  Straight leg raise   Side lying abduction  Side lying adduction  Prone hip extension  Supine SKTC  Supine DKTC  Physioball crunches 3 x 10  Physioball oblique crunch 3 x 10        PATIENT EDUCATION AND HOME EXERCISES     Education provided:   - Patient educated on applying 50% WB on the left lower extremity using a scale    Written Home Exercises Provided: yes. Exercises were reviewed and Socorro was able to demonstrate them prior to the end of the session.  Socorro demonstrated good  understanding of the education provided. See EMR under Patient Instructions for exercises provided during therapy sessions.    ASSESSMENT     Socorro is a 47 y.o. female referred to outpatient Physical Therapy with a medical diagnosis of Closed fracture of neck of left femur, initial encounter. Patient presents with :    Left hip pain  Decreased left hip Active range of motion  Decreased left hip  strength  Decreased lower extremity flexibility  Decreased ability to perform ADL and limited tolerance to standing and walking.      Patient prognosis is Good.   Patientt will benefit from skilled outpatient Physical Therapy to address the deficits stated above and in the chart below, provide patient /family education, and to maximize patientt's level of independence.     Plan of care discussed with patient: Yes  Patient's spiritual, cultural and educational needs considered and patient is agreeable to the plan of care and goals as stated below:     Anticipated Barriers for therapy: none    Medical Necessity is demonstrated by the following  History  Co-morbidities and personal factors that may impact the plan of care Co-morbidities:   high BMI    Personal Factors:   no deficits     low   Examination  Body Structures and Functions, activity limitations and participation restrictions that may impact the plan of care Body Regions:   lower extremities    Body Systems:    ROM  strength  gait    Participation Restrictions:   none    Activity limitations:   Learning and applying knowledge  no deficits    General Tasks and Commands  no deficits    Communication  no deficits    Mobility  walking    Self care  no deficits    Domestic Life  no deficits    Interactions/Relationships  no deficits    Life Areas  no deficits    Community and Social Life  no deficits         moderate   Clinical Presentation stable and uncomplicated low   Decision Making/ Complexity Score: low     Goals:  Short Term Goals (STG) # weeks Goal Review Date Reviewed Date Met   1. The patient will begin a written HEP 1 Initial 11/22/2022    2. Decrease soft tissue tenderness to mild 4 Initial 11/22/2022    3. Increase hip flexion to 110* 4 Initial 11/22/2022    4. The patient will begin ambulating without assistive device  4 Initial 11/22/2022      Long Term Goals (LTG) # weeks Goal Review Date Reviewed Date Met   1. The patient will be independent with  a HEP for maintenance 8 Initial 11/22/2022    2. Increase hip ROM to WFL 8 Initial 11/22/2022    3. Increase left hip strength to 5/5 8 Initial 11/22/2022    4. Decrease FOTO score to 39 % 8 Initial 11/22/2022    5. The patient will be able to ascend/descend 20 step/stairs without onset of symptoms.   8 Initial 11/22/2022        PLAN   Plan of care Certification: 11/22/2022 to 2/3/2022.    Outpatient Physical Therapy 2 times weekly for 8 weeks to include the following interventions: Gait Training, Manual Therapy, Neuromuscular Re-ed, Therapeutic Activities, and Therapeutic Exercise.     Ted Stanley, PT      I CERTIFY THE NEED FOR THESE SERVICES FURNISHED UNDER THIS PLAN OF TREATMENT AND WHILE UNDER MY CARE   Physician's comments:     Physician's Signature: ___________________________________________________

## 2022-11-29 ENCOUNTER — CLINICAL SUPPORT (OUTPATIENT)
Dept: REHABILITATION | Facility: HOSPITAL | Age: 47
End: 2022-11-29
Attending: NURSE PRACTITIONER
Payer: MEDICAID

## 2022-11-29 DIAGNOSIS — R26.9 GAIT ABNORMALITY: ICD-10-CM

## 2022-11-29 DIAGNOSIS — S72.012A: Primary | ICD-10-CM

## 2022-11-29 DIAGNOSIS — M25.661 DECREASED RANGE OF MOTION OF RIGHT LOWER EXTREMITY: ICD-10-CM

## 2022-11-29 DIAGNOSIS — R29.898 IMPAIRED FLEXIBILITY OF LOWER EXTREMITY: ICD-10-CM

## 2022-11-29 DIAGNOSIS — R29.898 WEAKNESS OF LEFT LOWER EXTREMITY: ICD-10-CM

## 2022-11-29 PROCEDURE — 97110 THERAPEUTIC EXERCISES: CPT | Mod: PN | Performed by: PHYSICAL THERAPIST

## 2022-11-29 NOTE — PROGRESS NOTES
[facility]   Physical Therapy Treatment Note     Name: Socorro [facility] Number: [id number]    Therapy Diagnosis:   Encounter Diagnoses   Name Primary?    Closed subcapital fracture of neck of left femur, initial encounter Yes    Gait abnormality     Weakness of left lower extremity     Impaired flexibility of lower extremity     Decreased range of motion of right lower extremity      Physician: Elva Aguilar NP    Visit Date: 11/29/2022    Physician Orders: PT Eval and Treat Weight bearing status 50% per Dr. Sauceda  Medical Diagnosis from Referral: Closed fracture of neck of left femur, initial encounter  Evaluation Date: 11/22/2022  Authorization Period Expiration: 12/31/2022  Plan of Care Expiration: 2/3/2023  Visit # / Visits authorized: 1/ 12   (POC 2/16)   FOTO Due visit 5  FOTO Due visit 10      Time In: 1300  Time Out: 1400  Total Billable Time: 60 minutes    PTA Visit #: 0/5     Precautions: Standard and Weightbearing (partial weoght bearing)    Insurance: Payor: MEDICAID / Plan: [place] [employer] CONNECT / Product Type: Managed Medicaid /     SUBJECTIVE     Pt reports: she is doing a little better and was able to start her exercises at home.  She was compliant with home exercise program.  Response to previous treatment: No complaints  Functional change: Ambulating partial weoght bearing with axillary crutches    Pain: 3/10  Location: left hip      OBJECTIVE     Objective Measures updated at progress report unless specified.     Treatment       Socorro received the treatments listed below:      THERAPEUTIC EXERCISES to develop strength, ROM, and flexibility for 60 minutes including :      Seated Hamstring stretch 3 x 30 sec B  Seated Gastroc-soleus stretch 3 x 30 sec B  Supine adductor squeeze 3 x 10  Supine hip abduction 3 x 10 with Green theraband   Standing hip abduction 3 x 10  Standing hip extension 3 x 10  Standing marching (left only) 3 x 10  Bridging 3 x 10  Straight leg  raise 3 x 10  Supine hip abduction 3 x 10  Supine SKTC with strap 3 x 10  Supine DKTC with Physioball 3 x 10  Physioball crunches 3 x 10  Physioball oblique crunch 3 x 10    Stationary bike 10 min level 1 seat 7    Future:  Side lying adduction  Prone hip extension      Patient Education and Home Exercises     Home Exercises Provided and Patient Education Provided     Education provided:   - Importance or regular physical activity    Written Home Exercises Provided: Patient instructed to cont prior HEP. Exercises were reviewed and Socorro was able to demonstrate them prior to the end of the session.  Socorro demonstrated good  understanding of the education provided. See EMR under Patient Instructions for exercises provided during therapy sessions    ASSESSMENT     The patient presents to PT ambulating partial weoght bearing with axillary crutches. She displayed good effort with all activities and tolerated treatment without adverse effect    Socorro Is progressing well towards her goals.   Pt prognosis is Good.     Pt will continue to benefit from skilled outpatient physical therapy to address the deficits listed in the problem list box on initial evaluation, provide pt/family education and to maximize pt's level of independence in the home and community environment.     Pt's spiritual, cultural and educational needs considered and pt agreeable to plan of care and goals.     Anticipated barriers to physical therapy: none    Goals:   Short Term Goals (STG) # weeks Goal Review Date Reviewed Date Met   1. The patient will begin a written HEP 1 progressing 11/29/2022       2. Decrease soft tissue tenderness to mild 4 progressing 11/29/2022       3. Increase hip flexion to 110* 4 progressing 11/29/2022       4. The patient will begin ambulating without assistive device  4 progressing 11/29/2022          Long Term Goals (LTG) # weeks Goal Review Date Reviewed Date Met   1. The patient will be independent with a HEP for  maintenance 8 progressing 11/29/2022       2. Increase hip ROM to WFL 8 progressing 11/29/2022       3. Increase left hip strength to 5/5 8 progressing 11/29/2022       4. Decrease FOTO score to 39 % 8 progressing 11/29/2022       5. The patient will be able to ascend/descend 20 step/stairs without onset of symptoms.    8 progressing 11/29/2022        PLAN     Continue with physical therapy as per plan of care      Ted Stanley, PT

## 2022-12-02 ENCOUNTER — TELEPHONE (OUTPATIENT)
Dept: ORTHOPEDICS | Facility: CLINIC | Age: 47
End: 2022-12-02
Payer: MEDICAID

## 2022-12-02 ENCOUNTER — CLINICAL SUPPORT (OUTPATIENT)
Dept: REHABILITATION | Facility: HOSPITAL | Age: 47
End: 2022-12-02
Attending: NURSE PRACTITIONER
Payer: MEDICAID

## 2022-12-02 ENCOUNTER — DOCUMENTATION ONLY (OUTPATIENT)
Dept: REHABILITATION | Facility: HOSPITAL | Age: 47
End: 2022-12-02

## 2022-12-02 DIAGNOSIS — S72.012A: Primary | ICD-10-CM

## 2022-12-02 DIAGNOSIS — R29.898 IMPAIRED FLEXIBILITY OF LOWER EXTREMITY: ICD-10-CM

## 2022-12-02 DIAGNOSIS — S72.002A CLOSED FRACTURE OF NECK OF LEFT FEMUR, INITIAL ENCOUNTER: ICD-10-CM

## 2022-12-02 DIAGNOSIS — R29.898 WEAKNESS OF LEFT LOWER EXTREMITY: ICD-10-CM

## 2022-12-02 DIAGNOSIS — M25.662 DECREASED RANGE OF MOTION OF LEFT LOWER EXTREMITY: ICD-10-CM

## 2022-12-02 DIAGNOSIS — R26.9 GAIT ABNORMALITY: ICD-10-CM

## 2022-12-02 PROCEDURE — 97110 THERAPEUTIC EXERCISES: CPT | Mod: PN,CQ

## 2022-12-02 RX ORDER — HYDROCODONE BITARTRATE AND ACETAMINOPHEN 5; 325 MG/1; MG/1
1 TABLET ORAL EVERY 4 HOURS PRN
Qty: 28 TABLET | Refills: 0 | Status: CANCELLED | OUTPATIENT
Start: 2022-12-02

## 2022-12-02 NOTE — TELEPHONE ENCOUNTER
Pt is s/p ORIF Left Femur on 11/06/2022. Pt's crutch slipped from under her and she jerked her left leg. Requesting pain med refill. Refill req sent to provider for review upon return to clinic on 12/05/2022. Pt advised to alternate between OTC Ibuprofen and Tylenol for pain relief. Refill req sent to provider for review upon his return to the office on 12/05/2022. Pt stated she will go the ER if pain becomes intolerable and she is unable to manage with OTC med. Noted.

## 2022-12-02 NOTE — PROGRESS NOTES
30 day visit PT-PTA face-face discussion with CORWIN Stanley re: patient status, POC, and plan for progression done 12/2/22.  Debra Myers, PTA

## 2022-12-02 NOTE — TELEPHONE ENCOUNTER
Pt is s/p ORIF Left Femur on 11/06/2022. Pt's crutch slipped from under her and she jerked her left leg. Requesting pain med refill. Last filled on 11/21/2022. Quantity 28. Pt advised to alternate between OTC Ibuprofen and Tylenol for pain relief.

## 2022-12-02 NOTE — PROGRESS NOTES
[facility]   Physical Therapy Treatment Note     Name: Socorro Willis  [facility] Number: [id number]    Therapy Diagnosis:   Encounter Diagnoses   Name Primary?    Closed subcapital fracture of neck of left femur, initial encounter Yes    Gait abnormality     Weakness of left lower extremity     Impaired flexibility of lower extremity     Decreased range of motion of left lower extremity      Physician: Elva Aguilar NP    Visit Date: 12/2/2022    Physician Orders: PT Eval and Treat Weight bearing status 50% per Dr. Sauceda  Medical Diagnosis from Referral: Closed fracture of neck of left femur, initial encounter  Evaluation Date: 11/22/2022  Authorization Period Expiration: 12/31/2022  Plan of Care Expiration: 2/3/2023  Visit # / Visits authorized: 2/ 12   (POC 3/16)   FOTO Due visit 5  FOTO Due visit 10      Time In: 100p  Time Out: 157p  Total Billable Time: 57 minutes      Precautions: Standard and Weightbearing (partial weight bearing)    Insurance: Payor: MEDICAID / Plan: [place] [employer] CONNECT / Product Type: Managed Medicaid /     SUBJECTIVE     Pt reports: she slipped on wet stairs yesterday, has swollen area and reported a bruise on lateral thigh region, has been walking around her trailer without AD.  She was compliant with home exercise program.  Response to previous treatment: good  Functional change: ongoing    Pain: 6/10  Location: left hip      OBJECTIVE     Objective Measures updated at progress report unless specified.     Treatment     Socorro received the treatments listed below:      THERAPEUTIC EXERCISES to develop strength, ROM, and flexibility for 57 minutes including :      Seated Hamstring stretch 3 x 30 sec Bilateral   NOT PERFORMED   Seated Gastroc-soleus stretch 3 x 30 sec Bilateral   NOT PERFORMED   Seated hip abduction Green Theraband 3 x 10 - pain in lateral L knee with supine position   Seated adductor squeeze 3 x 10     Standing hip abduction 3 x 10  Standing  hip extension 3 x 10  Standing marching (left only) 3 x 10    Bridging 3 x 10  Straight leg raise 3 x 10  Supine SKTC with strap 3 x 10  Supine DKTC with Physioball 3 x 10  Physioball crunches 3 x 10  Physioball oblique crunch 3 x 10    Stationary bike 10 min level 1 seat 7, 6    Future:  Side lying adduction  Prone hip extension      Patient Education and Home Exercises     Home Exercises Provided and Patient Education Provided     Education provided:   - Importance or regular physical activity    Written Home Exercises Provided: Patient instructed to cont prior HEP. Exercises were reviewed and Socorro was able to demonstrate them prior to the end of the session.  Socorro demonstrated good  understanding of the education provided. See EMR under Patient Instructions for exercises provided during therapy sessions    ASSESSMENT     Socorro presents with increased L hip/groin pain after slipping yesterday.  Reviewed importance of PWB until she is released.  Pt was able to perform Straight Leg Raise without assistance today.  Continued LE strengthening to improve gait pattern, decrease fall risk.    Socorro Is progressing well towards her goals.   Pt prognosis is Good.     Pt will continue to benefit from skilled outpatient physical therapy to address the deficits listed in the problem list box on initial evaluation, provide pt/family education and to maximize pt's level of independence in the home and community environment.     Pt's spiritual, cultural and educational needs considered and pt agreeable to plan of care and goals.     Anticipated barriers to physical therapy: none    Goals:   Short Term Goals (STG) # weeks Goal Review Date Reviewed Date Met   1. The patient will begin a written HEP 1 progressing 12/2/2022       2. Decrease soft tissue tenderness to mild 4 progressing 12/2/2022       3. Increase hip flexion to 110* 4 progressing 12/2/2022       4. The patient will begin ambulating without assistive device  4  progressing 12/2/2022          Long Term Goals (LTG) # weeks Goal Review Date Reviewed Date Met   1. The patient will be independent with a HEP for maintenance 8 progressing 12/2/2022       2. Increase hip ROM to WFL 8 progressing 12/2/2022       3. Increase left hip strength to 5/5 8 progressing 12/2/2022       4. Decrease FOTO score to 39 % 8 progressing 12/2/2022       5. The patient will be able to ascend/descend 20 step/stairs without onset of symptoms.    8 progressing 12/2/2022        PLAN     Continue with physical therapy as per plan of care      Debra Myers, PTA

## 2022-12-05 DIAGNOSIS — S72.002A CLOSED FRACTURE OF NECK OF LEFT FEMUR, INITIAL ENCOUNTER: ICD-10-CM

## 2022-12-06 ENCOUNTER — CLINICAL SUPPORT (OUTPATIENT)
Dept: REHABILITATION | Facility: HOSPITAL | Age: 47
End: 2022-12-06
Attending: NURSE PRACTITIONER
Payer: MEDICAID

## 2022-12-06 DIAGNOSIS — R29.898 IMPAIRED FLEXIBILITY OF LOWER EXTREMITY: ICD-10-CM

## 2022-12-06 DIAGNOSIS — S72.012A: Primary | ICD-10-CM

## 2022-12-06 DIAGNOSIS — R26.9 GAIT ABNORMALITY: ICD-10-CM

## 2022-12-06 DIAGNOSIS — M25.662 DECREASED RANGE OF MOTION OF LEFT LOWER EXTREMITY: ICD-10-CM

## 2022-12-06 DIAGNOSIS — R29.898 WEAKNESS OF LEFT LOWER EXTREMITY: ICD-10-CM

## 2022-12-06 PROCEDURE — 97110 THERAPEUTIC EXERCISES: CPT | Mod: PN | Performed by: PHYSICAL THERAPIST

## 2022-12-06 RX ORDER — HYDROCODONE BITARTRATE AND ACETAMINOPHEN 5; 325 MG/1; MG/1
1 TABLET ORAL EVERY 4 HOURS PRN
Qty: 28 TABLET | Refills: 0 | Status: SHIPPED | OUTPATIENT
Start: 2022-12-06 | End: 2022-12-20 | Stop reason: SDUPTHER

## 2022-12-06 NOTE — PROGRESS NOTES
[facility]   Physical Therapy Treatment Note     Name: Socorro Willis  [facility] Number: [id number]    Therapy Diagnosis:   Encounter Diagnoses   Name Primary?    Closed subcapital fracture of neck of left femur, initial encounter Yes    Gait abnormality     Weakness of left lower extremity     Impaired flexibility of lower extremity     Decreased range of motion of left lower extremity      Physician: Elva Aguilar NP    Visit Date: 12/6/2022    Physician Orders: PT Eval and Treat Weight bearing status 50% per Dr. Sauceda  Medical Diagnosis from Referral: Closed fracture of neck of left femur, initial encounter  Evaluation Date: 11/22/2022  Authorization Period Expiration: 12/31/2022  Plan of Care Expiration: 2/3/2023  Visit # / Visits authorized: 3/ 12   (POC 4/16)   FOTO Due visit 5  FOTO Due visit 10      Time In: 1300  Time Out: 1400  Total Billable Time: 60 minutes      Precautions: Standard and Weightbearing (partial weight bearing)    Insurance: Payor: MEDICAID / Plan: [employer] CONNECT / Product Type: Managed Medicaid /     SUBJECTIVE     Pt reports: hip feels stiff today.   She was compliant with home exercise program.  Response to previous treatment: good  Functional change: ongoing    Pain: 6/10  Location: left hip      OBJECTIVE     Objective Measures updated at progress report unless specified.     Treatment     Socorro received the treatments listed below:      THERAPEUTIC EXERCISES to develop strength, ROM, and flexibility for 60 minutes including :      Seated Hamstring stretch 3 x 30 sec Bilateral   NOT PERFORMED   Seated Gastroc-soleus stretch 3 x 30 sec Bilateral   NOT PERFORMED   Seated hip abduction Green Theraband 3 x 10 - pain in lateral L knee with supine position   Seated adductor squeeze 3 x 10     Standing hip abduction 3 x 10  Standing hip extension 3 x 10  Standing marching (left only) 3 x 10    Bridging 3 x 10  Straight leg raise 3 x 10  Supine SKTC with  strap 3 x 10  Supine DKTC with Physioball 3 x 10  Physioball crunches 3 x 10  Physioball oblique crunch 3 x 10    Stationary bike 10 min level 1 seat 7, 6    Future:  Side lying adduction  Prone hip extension      Patient Education and Home Exercises     Home Exercises Provided and Patient Education Provided     Education provided:   - Importance or regular physical activity    Written Home Exercises Provided: Patient instructed to cont prior HEP. Exercises were reviewed and Socorro was able to demonstrate them prior to the end of the session.  Socorro demonstrated good  understanding of the education provided. See EMR under Patient Instructions for exercises provided during therapy sessions    ASSESSMENT     The patient presents to PT ambulating partial weight bearing with axillary crutches. She displayed good effort with all activities and tolerated treatment without adverse effect    Socorro Is progressing well towards her goals.   Pt prognosis is Good.     Pt will continue to benefit from skilled outpatient physical therapy to address the deficits listed in the problem list box on initial evaluation, provide pt/family education and to maximize pt's level of independence in the home and community environment.     Pt's spiritual, cultural and educational needs considered and pt agreeable to plan of care and goals.     Anticipated barriers to physical therapy: none    Goals:   Short Term Goals (STG) # weeks Goal Review Date Reviewed Date Met   1. The patient will begin a written HEP 1 progressing 12/6/2022       2. Decrease soft tissue tenderness to mild 4 progressing 12/6/2022       3. Increase hip flexion to 110* 4 progressing 12/6/2022       4. The patient will begin ambulating without assistive device  4 progressing 12/6/2022          Long Term Goals (LTG) # weeks Goal Review Date Reviewed Date Met   1. The patient will be independent with a HEP for maintenance 8 progressing 12/6/2022       2. Increase hip ROM to  WFL 8 progressing 12/6/2022       3. Increase left hip strength to 5/5 8 progressing 12/6/2022       4. Decrease FOTO score to 39 % 8 progressing 12/6/2022       5. The patient will be able to ascend/descend 20 step/stairs without onset of symptoms.    8 progressing 12/6/2022        PLAN     Continue with physical therapy as per plan of care      Ted Stanley, PT

## 2022-12-09 ENCOUNTER — CLINICAL SUPPORT (OUTPATIENT)
Dept: REHABILITATION | Facility: HOSPITAL | Age: 47
End: 2022-12-09
Attending: NURSE PRACTITIONER
Payer: MEDICAID

## 2022-12-09 DIAGNOSIS — R29.898 WEAKNESS OF LEFT LOWER EXTREMITY: ICD-10-CM

## 2022-12-09 DIAGNOSIS — R29.898 IMPAIRED FLEXIBILITY OF LOWER EXTREMITY: ICD-10-CM

## 2022-12-09 DIAGNOSIS — M25.662 DECREASED RANGE OF MOTION OF LEFT LOWER EXTREMITY: ICD-10-CM

## 2022-12-09 DIAGNOSIS — S72.012A: Primary | ICD-10-CM

## 2022-12-09 DIAGNOSIS — R26.9 GAIT ABNORMALITY: ICD-10-CM

## 2022-12-09 PROCEDURE — 97110 THERAPEUTIC EXERCISES: CPT | Mod: PN | Performed by: PHYSICAL THERAPIST

## 2022-12-09 NOTE — PROGRESS NOTES
[facility]   Physical Therapy Treatment Note     Name: Socorro [facility] Number: [id number]    Therapy Diagnosis:   Encounter Diagnoses   Name Primary?    Closed subcapital fracture of neck of left femur, initial encounter Yes    Gait abnormality     Weakness of left lower extremity     Impaired flexibility of lower extremity     Decreased range of motion of left lower extremity      Physician: Elva Aguilar NP    Visit Date: 12/9/2022    Physician Orders: PT Eval and Treat Weight bearing status 50% per Dr. Sauceda  Medical Diagnosis from Referral: Closed fracture of neck of left femur, initial encounter  Evaluation Date: 11/22/2022  Authorization Period Expiration: 12/31/2022  Plan of Care Expiration: 2/3/2023  Visit # / Visits authorized: 3/ 12   (POC 4/16)   FOTO Due visit 5  FOTO Due visit 10      Time In: 1300  Time Out: 1400  Total Billable Time: 60 minutes      Precautions: Standard and Weightbearing (partial weight bearing)    Insurance: Payor: MEDICAID / Plan: [employer] CONNECT / Product Type: Managed Medicaid /     SUBJECTIVE     Pt reports: No new complaints.   She was compliant with home exercise program.  Response to previous treatment: good  Functional change: ongoing    Pain: 6/10  Location: left hip      OBJECTIVE     Objective Measures updated at progress report unless specified.     Treatment     Socorro received the treatments listed below:      THERAPEUTIC EXERCISES to develop strength, ROM, and flexibility for 60 minutes including :      Seated Hamstring stretch 3 x 30 sec Bilateral   NOT PERFORMED   Seated Gastroc-soleus stretch 3 x 30 sec Bilateral   NOT PERFORMED   Seated hip abduction Green Theraband 3 x 10 - pain in lateral L knee with supine position   Seated adductor squeeze 3 x 10     Standing hip abduction 3 x 10  Standing hip extension 3 x 10  Standing marching (left only) 3 x 10    Bridging 3 x 10  Straight leg raise 3 x 10  Supine SKTC with strap 3  x 10  Supine DKTC with Physioball 3 x 10  Physioball crunches 3 x 10  Physioball oblique crunch 3 x 10    Stationary bike 10 min level 1 seat 6    Future:  Side lying adduction  Prone hip extension      Patient Education and Home Exercises     Home Exercises Provided and Patient Education Provided     Education provided:   - Importance or regular physical activity    Written Home Exercises Provided: Patient instructed to cont prior HEP. Exercises were reviewed and Socorro was able to demonstrate them prior to the end of the session.  Socorro demonstrated good  understanding of the education provided. See EMR under Patient Instructions for exercises provided during therapy sessions    ASSESSMENT     The patient presents to PT ambulating partial weight bearing with axillary crutches. She displayed good effort with all activities and tolerated treatment without adverse effect. She will benefit from the continuation of skilled physical therapy services to progress lower extremity strength and return to previous level of function    Socorro Is progressing well towards her goals.   Pt prognosis is Good.     Pt will continue to benefit from skilled outpatient physical therapy to address the deficits listed in the problem list box on initial evaluation, provide pt/family education and to maximize pt's level of independence in the home and community environment.     Pt's spiritual, cultural and educational needs considered and pt agreeable to plan of care and goals.     Anticipated barriers to physical therapy: none    Goals:   Short Term Goals (STG) # weeks Goal Review Date Reviewed Date Met   1. The patient will begin a written HEP 1 progressing 12/9/2022       2. Decrease soft tissue tenderness to mild 4 progressing 12/9/2022       3. Increase hip flexion to 110* 4 progressing 12/9/2022       4. The patient will begin ambulating without assistive device  4 progressing 12/9/2022          Long Term Goals (LTG) # weeks Goal  Review Date Reviewed Date Met   1. The patient will be independent with a HEP for maintenance 8 progressing 12/9/2022       2. Increase hip ROM to WFL 8 progressing 12/9/2022       3. Increase left hip strength to 5/5 8 progressing 12/9/2022       4. Decrease FOTO score to 39 % 8 progressing 12/9/2022       5. The patient will be able to ascend/descend 20 step/stairs without onset of symptoms.    8 progressing 12/9/2022        PLAN     Continue with physical therapy as per plan of care      Ted Stanley, PT

## 2022-12-13 ENCOUNTER — CLINICAL SUPPORT (OUTPATIENT)
Dept: REHABILITATION | Facility: HOSPITAL | Age: 47
End: 2022-12-13
Attending: NURSE PRACTITIONER
Payer: MEDICAID

## 2022-12-13 DIAGNOSIS — S72.012A: Primary | ICD-10-CM

## 2022-12-13 DIAGNOSIS — R26.9 GAIT ABNORMALITY: ICD-10-CM

## 2022-12-13 DIAGNOSIS — R29.898 IMPAIRED FLEXIBILITY OF LOWER EXTREMITY: ICD-10-CM

## 2022-12-13 DIAGNOSIS — M25.662 DECREASED RANGE OF MOTION OF LEFT LOWER EXTREMITY: ICD-10-CM

## 2022-12-13 DIAGNOSIS — R29.898 WEAKNESS OF LEFT LOWER EXTREMITY: ICD-10-CM

## 2022-12-13 PROCEDURE — 97110 THERAPEUTIC EXERCISES: CPT | Mod: PN | Performed by: PHYSICAL THERAPIST

## 2022-12-13 NOTE — PROGRESS NOTES
[facility]   Physical Therapy Treatment Note     Name: Socorro [facility] Number: [id number]    Therapy Diagnosis:   Encounter Diagnoses   Name Primary?    Closed subcapital fracture of neck of left femur, initial encounter Yes    Gait abnormality     Weakness of left lower extremity     Impaired flexibility of lower extremity     Decreased range of motion of left lower extremity      Physician: Elva Aguilar NP    Visit Date: 12/13/2022    Physician Orders: PT Eval and Treat Weight bearing status 50% per Dr. Sauceda  Medical Diagnosis from Referral: Closed fracture of neck of left femur, initial encounter  Evaluation Date: 11/22/2022  Authorization Period Expiration: 12/31/2022  Plan of Care Expiration: 2/3/2023  Visit # / Visits authorized: 4/ 12   (POC 5/16)   FOTO Due visit 5  FOTO Due visit 10      Time In: 1500  Time Out: 1600  Total Billable Time: 60 minutes      Precautions: Standard and Weightbearing (partial weight bearing)    Insurance: Payor: MEDICAID / Plan: [employer] CONNECT / Product Type: Managed Medicaid /     SUBJECTIVE     Pt reports: No new complaints.   She was compliant with home exercise program.  Response to previous treatment: good  Functional change: ongoing    Pain: 6/10  Location: left hip      OBJECTIVE     Objective Measures updated at progress report unless specified.     Treatment     Socorro received the treatments listed below:      THERAPEUTIC EXERCISES to develop strength, ROM, and flexibility for 60 minutes including :      Seated Hamstring stretch 3 x 30 sec Bilateral   NOT PERFORMED   Seated Gastroc-soleus stretch 3 x 30 sec Bilateral   NOT PERFORMED   Seated hip abduction Green Theraband 3 x 10 - pain in lateral L knee with supine position   Seated adductor squeeze 3 x 10     Standing hip abduction 3 x 10  Standing hip extension 3 x 10  Standing marching (left only) 3 x 10    Bridging 3 x 10  Straight leg raise 3 x 10  Supine SKTC with strap  3 x 10  Supine DKTC with Physioball 3 x 10  Physioball crunches 3 x 10  Physioball oblique crunch 3 x 10    Stationary bike 10 min level 1 seat 6    Future:  Side lying adduction  Prone hip extension      Patient Education and Home Exercises     Home Exercises Provided and Patient Education Provided     Education provided:   - Importance or regular physical activity    Written Home Exercises Provided: Patient instructed to cont prior HEP. Exercises were reviewed and Socorro was able to demonstrate them prior to the end of the session.  Socorro demonstrated good  understanding of the education provided. See EMR under Patient Instructions for exercises provided during therapy sessions    ASSESSMENT     Patient displays improved tolerance to weight bearing. She displayed good effort with all exercises. Patient tolerated treatment well without report of adverse effects.      Socorro Is progressing well towards her goals.   Pt prognosis is Good.     Pt will continue to benefit from skilled outpatient physical therapy to address the deficits listed in the problem list box on initial evaluation, provide pt/family education and to maximize pt's level of independence in the home and community environment.     Pt's spiritual, cultural and educational needs considered and pt agreeable to plan of care and goals.     Anticipated barriers to physical therapy: none    Goals:   Short Term Goals (STG) # weeks Goal Review Date Reviewed Date Met   1. The patient will begin a written HEP 1 progressing 12/13/2022       2. Decrease soft tissue tenderness to mild 4 progressing 12/13/2022       3. Increase hip flexion to 110* 4 progressing 12/13/2022       4. The patient will begin ambulating without assistive device  4 progressing 12/13/2022          Long Term Goals (LTG) # weeks Goal Review Date Reviewed Date Met   1. The patient will be independent with a HEP for maintenance 8 progressing 12/13/2022       2. Increase hip ROM to WFL 8  progressing 12/13/2022       3. Increase left hip strength to 5/5 8 progressing 12/13/2022       4. Decrease FOTO score to 39 % 8 progressing 12/13/2022       5. The patient will be able to ascend/descend 20 step/stairs without onset of symptoms.    8 progressing 12/13/2022        PLAN     Continue with physical therapy as per plan of care      Ted Stanley, PT

## 2022-12-14 DIAGNOSIS — S72.002A CLOSED FRACTURE OF NECK OF LEFT FEMUR, INITIAL ENCOUNTER: Primary | ICD-10-CM

## 2022-12-16 ENCOUNTER — CLINICAL SUPPORT (OUTPATIENT)
Dept: REHABILITATION | Facility: HOSPITAL | Age: 47
End: 2022-12-16
Attending: NURSE PRACTITIONER
Payer: MEDICAID

## 2022-12-16 DIAGNOSIS — M25.662 DECREASED RANGE OF MOTION OF LEFT LOWER EXTREMITY: ICD-10-CM

## 2022-12-16 DIAGNOSIS — R26.9 GAIT ABNORMALITY: ICD-10-CM

## 2022-12-16 DIAGNOSIS — R29.898 IMPAIRED FLEXIBILITY OF LOWER EXTREMITY: ICD-10-CM

## 2022-12-16 DIAGNOSIS — R29.898 WEAKNESS OF LEFT LOWER EXTREMITY: ICD-10-CM

## 2022-12-16 DIAGNOSIS — S72.012A: Primary | ICD-10-CM

## 2022-12-16 PROCEDURE — 97110 THERAPEUTIC EXERCISES: CPT | Mod: PN,CQ

## 2022-12-16 NOTE — PROGRESS NOTES
"[facility] OUTPATIENT THERAPY AND WELLNESS   Physical Therapy Treatment Note     Name: Socorro Willis  [facility] Number: [id number]    Therapy Diagnosis:   Encounter Diagnoses   Name Primary?    Closed subcapital fracture of neck of left femur, initial encounter Yes    Gait abnormality     Weakness of left lower extremity     Impaired flexibility of lower extremity     Decreased range of motion of left lower extremity      Physician: Elva Aguilar NP    Visit Date: 12/16/2022    Physician Orders: PT Eval and Treat Weight bearing status 50% per Dr. Sauceda  Medical Diagnosis from Referral: Closed fracture of neck of left femur, initial encounter  Evaluation Date: 11/22/2022  Authorization Period Expiration: 12/31/2022  Plan of Care Expiration: 2/3/2023  Visit # / Visits authorized: 7/ 12   (POC 6/16)   FOTO Due visit 5  FOTO Due visit 10      Time In: 101p  Time Out: 157p  Total Billable Time: 56 minutes      Precautions: Standard and Weightbearing (partial weight bearing)    Insurance: Payor: MEDICAID / Plan: [employer] CONNECT / Product Type: Managed Medicaid /     SUBJECTIVE     Pt reports: "You caught me on a stiff day", unable to walk 100% of the time with crutches in camper, not enough room  She was compliant with home exercise program.  Response to previous treatment: positive  Functional change: ongoing    Pain: 4/10  Location: left hip      OBJECTIVE     Objective Measures updated at progress report unless specified.     Treatment     Socorro received the treatments listed below:      THERAPEUTIC EXERCISES to develop strength, ROM, and flexibility for 56 minutes including :    During 56 minutes of therapeutic exercise, Socorro, was supervised by a rehabilitation technician under the direction of the treating therapist.    Seated Hamstring stretch 3 x 30 sec Bilateral   NOT PERFORMED   Seated Gastroc-soleus stretch 3 x 30 sec Bilateral     Seated hip abduction Green Theraband 3 x 10 - unable to complete due to " lateral knee pain   Seated adductor squeeze 3 x 10     Standing hip abduction 3 x 10  NOT PERFORMED   Standing hip extension 3 x 10 NOT PERFORMED   Standing marching (left only) 3 x 10  NOT PERFORMED     Bridging 3 x 10  Straight leg raise 3 x 10 L  Supine SKTC with strap 3 x 10  Supine DKTC with Physioball 3 x 10  Physioball crunches 3 x 10  Physioball oblique crunch 3 x 10    Stationary bike 10 min level 1 seat 6    Future:  Side lying adduction  Prone hip extension      Patient Education and Home Exercises     Home Exercises Provided and Patient Education Provided     Education provided:   - Importance or regular physical activity    Written Home Exercises Provided: Patient instructed to cont prior HEP. Exercises were reviewed and Socorro was able to demonstrate them prior to the end of the session.  Socorro demonstrated good  understanding of the education provided. See EMR under Patient Instructions for exercises provided during therapy sessions    ASSESSMENT     Socorro continues to show improvement in LE strength.  She did report pain in lateral knee region with sitting hip abduction with theraband.  Try Red Theraband and move theraband to distal thigh next visit.  Patient is not completely compliant with 50% weightbearing limitations.      Socorro Is progressing well towards her goals.   Pt prognosis is Good.     Pt will continue to benefit from skilled outpatient physical therapy to address the deficits listed in the problem list box on initial evaluation, provide pt/family education and to maximize pt's level of independence in the home and community environment.     Pt's spiritual, cultural and educational needs considered and pt agreeable to plan of care and goals.     Anticipated barriers to physical therapy: none    Goals:   Short Term Goals (STG) # weeks Goal Review Date Reviewed Date Met   1. The patient will begin a written HEP 1 progressing 12/16/2022       2. Decrease soft tissue tenderness to mild 4  progressing 12/16/2022       3. Increase hip flexion to 110* 4 progressing 12/16/2022       4. The patient will begin ambulating without assistive device  4 progressing 12/16/2022          Long Term Goals (LTG) # weeks Goal Review Date Reviewed Date Met   1. The patient will be independent with a HEP for maintenance 8 progressing 12/16/2022       2. Increase hip ROM to WFL 8 progressing 12/16/2022       3. Increase left hip strength to 5/5 8 progressing 12/16/2022       4. Decrease FOTO score to 39 % 8 progressing 12/16/2022       5. The patient will be able to ascend/descend 20 step/stairs without onset of symptoms.    8 progressing 12/16/2022        PLAN     Continue with physical therapy as per plan of care      Debra Myers, PTA

## 2022-12-19 ENCOUNTER — OFFICE VISIT (OUTPATIENT)
Dept: ORTHOPEDICS | Facility: CLINIC | Age: 47
End: 2022-12-19
Payer: MEDICAID

## 2022-12-19 ENCOUNTER — HOSPITAL ENCOUNTER (OUTPATIENT)
Dept: RADIOLOGY | Facility: HOSPITAL | Age: 47
Discharge: HOME | End: 2022-12-19
Attending: ORTHOPAEDIC SURGERY
Payer: MEDICAID

## 2022-12-19 VITALS — WEIGHT: 175 LBS | RESPIRATION RATE: 18 BRPM | HEIGHT: 63 IN | BODY MASS INDEX: 31.01 KG/M2

## 2022-12-19 DIAGNOSIS — S72.002A CLOSED FRACTURE OF NECK OF LEFT FEMUR, INITIAL ENCOUNTER: ICD-10-CM

## 2022-12-19 DIAGNOSIS — S72.002D CLOSED FRACTURE OF NECK OF LEFT FEMUR WITH ROUTINE HEALING, SUBSEQUENT ENCOUNTER: Primary | ICD-10-CM

## 2022-12-19 PROCEDURE — 1160F PR REVIEW ALL MEDS BY PRESCRIBER/CLIN PHARMACIST DOCUMENTED: ICD-10-PCS | Mod: CPTII,,, | Performed by: ORTHOPAEDIC SURGERY

## 2022-12-19 PROCEDURE — 99213 OFFICE O/P EST LOW 20 MIN: CPT | Mod: PBBFAC,PN | Performed by: ORTHOPAEDIC SURGERY

## 2022-12-19 PROCEDURE — 1159F PR MEDICATION LIST DOCUMENTED IN MEDICAL RECORD: ICD-10-PCS | Mod: CPTII,,, | Performed by: ORTHOPAEDIC SURGERY

## 2022-12-19 PROCEDURE — 3008F PR BODY MASS INDEX (BMI) DOCUMENTED: ICD-10-PCS | Mod: CPTII,,, | Performed by: ORTHOPAEDIC SURGERY

## 2022-12-19 PROCEDURE — 1159F MED LIST DOCD IN RCRD: CPT | Mod: CPTII,,, | Performed by: ORTHOPAEDIC SURGERY

## 2022-12-19 PROCEDURE — 1160F RVW MEDS BY RX/DR IN RCRD: CPT | Mod: CPTII,,, | Performed by: ORTHOPAEDIC SURGERY

## 2022-12-19 PROCEDURE — 73552 X-RAY EXAM OF FEMUR 2/>: CPT | Mod: 26,LT,, | Performed by: RADIOLOGY

## 2022-12-19 PROCEDURE — 99024 POSTOP FOLLOW-UP VISIT: CPT | Mod: ,,, | Performed by: ORTHOPAEDIC SURGERY

## 2022-12-19 PROCEDURE — 3008F BODY MASS INDEX DOCD: CPT | Mod: CPTII,,, | Performed by: ORTHOPAEDIC SURGERY

## 2022-12-19 PROCEDURE — 99999 PR PBB SHADOW E&M-EST. PATIENT-LVL III: CPT | Mod: PBBFAC,,, | Performed by: ORTHOPAEDIC SURGERY

## 2022-12-19 PROCEDURE — 73552 X-RAY EXAM OF FEMUR 2/>: CPT | Mod: TC,PN,LT

## 2022-12-19 PROCEDURE — 73552 XR FEMUR 2 VIEW LEFT: ICD-10-PCS | Mod: 26,LT,, | Performed by: RADIOLOGY

## 2022-12-19 PROCEDURE — 99024 PR POST-OP FOLLOW-UP VISIT: ICD-10-PCS | Mod: ,,, | Performed by: ORTHOPAEDIC SURGERY

## 2022-12-19 PROCEDURE — 99999 PR PBB SHADOW E&M-EST. PATIENT-LVL III: ICD-10-PCS | Mod: PBBFAC,,, | Performed by: ORTHOPAEDIC SURGERY

## 2022-12-19 NOTE — PROGRESS NOTES
Past Medical History:   Diagnosis Date    Back injury     Sciatic nerve pain        Past Surgical History:   Procedure Laterality Date    OPEN REDUCTION AND INTERNAL FIXATION (ORIF) OF INTERTROCHANTERIC FRACTURE OF FEMUR Left 11/6/2022    Procedure: ORIF, FRACTURE, FEMUR, INTERTROCHANTERIC;  Surgeon: Mango Sauceda MD;  [facility];  Service: Orthopedics;  Laterality: Left;    TUBAL LIGATION         Current Outpatient Medications   Medication Sig    aspirin (ECOTRIN) 81 MG EC tablet Take 1 tablet (81 mg total) by mouth 2 (two) times a day.    HYDROcodone-acetaminophen (NORCO) 5-325 mg per tablet Take 1 tablet by mouth every 4 (four) hours as needed for Pain. (Patient not taking: Reported on 12/19/2022)     No current facility-administered medications for this visit.       Review of patient's allergies indicates:  No Known Allergies    Family History   Problem Relation Age of Onset    Heart disease Father        Social History     Socioeconomic History    Marital status:    Tobacco Use    Smoking status: Every Day     Packs/day: 1.00     Types: Cigarettes    Smokeless tobacco: Never   Substance and Sexual Activity    Alcohol use: Yes     Alcohol/week: 14.0 standard drinks     Types: 14 Cans of beer per week    Drug use: Not Currently     Social Determinants of Health     Financial Resource Strain: Unknown    Difficulty of Paying Living Expenses: Patient refused   Food Insecurity: Unknown    Worried About Running Out of Food in the Last Year: Patient refused    Ran Out of Food in the Last Year: Patient refused   Transportation Needs: Unknown    Lack of Transportation (Medical): Patient refused    Lack of Transportation (Non-Medical): Patient refused   Physical Activity: Unknown    Days of Exercise per Week: Patient refused    Minutes of Exercise per Session: Patient refused   Stress: Unknown    Feeling of Stress : Patient refused   Social Connections: Unknown    Frequency of Communication with  Friends and Family: Patient refused    Frequency of Social Gatherings with Friends and Family: Patient refused    Attends Ten[employer] Services: Patient refused    Active Member of Clubs or Organizations: Patient refused    Attends Club or Organization Meetings: Patient refused    Marital Status: Patient refused   Housing Stability: Unknown    Unable to Pay for Housing in the Last Year: Patient refused    Unstable Housing in the Last Year: Patient refused       Chief Complaint:   Chief Complaint   Patient presents with    Post-op Evaluation     s/p IM nailing of a left intertrochanteric femur fracture. dos 11/2/22       Date of surgery:  November 6, 2022    History of present illness:  47-year-old female seen after IM nailing of a left intertrochanteric femur fracture.  She is doing pretty well.  Has a fair amount of pain.  Pain is an 5/10.  Worse at night.      Review of Systems:    Musculoskeletal:  See HPI        Physical Examination:    Vital Signs:    Vitals:    12/19/22 0843   Resp: 18       Body mass index is 31 kg/m².    This a well-developed, well nourished patient in no acute distress.  They are alert and oriented and cooperative to examination.  Pt. walks with a mild antalgic gait using crutches.    Examination left hip shows healed surgical incisions.  No erythema drainage.  Patient is neurovascularly intact.  Decent range of motion of the hip.    X-rays:  X-rays left femur ordered and reviewed which show  intertrochanteric femur fracture with robin and screws still in position but there definitely has been some movement of the femoral neck fracture into a little more varus compared to previous x-rays.     Assessment::  Status post IM nailing of left intertrochanteric femur fracture    Plan:  Reviewed the findings with her today.  Continue weight-bearing restrictions using crutches.   Follow-up in 4 weeks with another x-ray of the left hip.    This note was created using [employer] voice recognition software  that occasionally misinterpreted phrases or words.

## 2022-12-20 ENCOUNTER — CLINICAL SUPPORT (OUTPATIENT)
Dept: REHABILITATION | Facility: HOSPITAL | Age: 47
End: 2022-12-20
Attending: NURSE PRACTITIONER
Payer: MEDICAID

## 2022-12-20 DIAGNOSIS — R29.898 WEAKNESS OF LEFT LOWER EXTREMITY: ICD-10-CM

## 2022-12-20 DIAGNOSIS — R29.898 IMPAIRED FLEXIBILITY OF LOWER EXTREMITY: ICD-10-CM

## 2022-12-20 DIAGNOSIS — S72.012A: Primary | ICD-10-CM

## 2022-12-20 DIAGNOSIS — R26.9 GAIT ABNORMALITY: ICD-10-CM

## 2022-12-20 PROCEDURE — 97110 THERAPEUTIC EXERCISES: CPT | Mod: PN | Performed by: PHYSICAL THERAPIST

## 2022-12-20 NOTE — PROGRESS NOTES
[facility]   Physical Therapy Treatment Note     Name: Socorro [facility] Number: [id number]    Therapy Diagnosis:   Encounter Diagnoses   Name Primary?    Closed subcapital fracture of neck of left femur, initial encounter Yes    Gait abnormality     Weakness of left lower extremity     Impaired flexibility of lower extremity      Physician: Elva Aguilar NP    Visit Date: 12/20/2022    Physician Orders: PT Eval and Treat Weight bearing status toe touch weight bearing per Dr. Sauceda  Medical Diagnosis from Referral: Closed fracture of neck of left femur, initial encounter  Evaluation Date: 11/22/2022  Authorization Period Expiration: 12/31/2022  Plan of Care Expiration: 2/3/2023  Visit # / Visits authorized: 7/ 12   (POC 8/16)   FOTO Due visit 5  FOTO Due visit 10      Time In: 1300  Time Out: 1400  Total Billable Time: 60 minutes      Precautions: Standard and Weightbearing (Updated 12/19 - toe touch weight bearing)     Insurance: Payor: MEDICAID / Plan: [employer] CONNECT / Product Type: Managed Medicaid /     SUBJECTIVE     Pt reports: left anterior thigh pain  She was compliant with home exercise program.  Response to previous treatment: good  Functional change: ongoing    Pain: 6/10  Location: left hip      OBJECTIVE     Objective Measures updated at progress report unless specified.     Treatment     Socorro received the treatments listed below:      THERAPEUTIC EXERCISES to develop strength, ROM, and flexibility for 60 minutes including :      Seated Hamstring stretch 3 x 30 sec Bilateral     Seated Gastroc-soleus stretch 3 x 30 sec Bilateral     Bridging 3 x 10  Straight leg raise 3 x 10  Supine SKTC with strap 3 x 10  Supine DKTC with Physioball 3 x 10  Physioball crunches 3 x 10  Physioball oblique crunch 3 x 10    Standing hip abduction 3 x 10  Standing hip extension 3 x 10  Standing marching (left only) 3 x 10    Stationary bike 10 min level 1 seat 6    Seated marching  3 x 10  Seated long arc quads 3 x 10    Patient instructed in toe touch weight bearing with axillary crutches    Future:  Side lying adduction  Prone hip extension      Patient Education and Home Exercises     Home Exercises Provided and Patient Education Provided     Education provided:   - Importance or regular physical activity    Written Home Exercises Provided: Patient instructed to cont prior HEP. Exercises were reviewed and Socorro was able to demonstrate them prior to the end of the session.  Socorro demonstrated good  understanding of the education provided. See EMR under Patient Instructions for exercises provided during therapy sessions    ASSESSMENT     Patient required verbal cues for toe touch weight bearing. She displayed a little difficulty with lower extremity placement. She displayed good effort with all exercises. Patient tolerated treatment well without report of adverse effects.      Socorro Is progressing well towards her goals.   Pt prognosis is Good.     Pt will continue to benefit from skilled outpatient physical therapy to address the deficits listed in the problem list box on initial evaluation, provide pt/family education and to maximize pt's level of independence in the home and community environment.     Pt's spiritual, cultural and educational needs considered and pt agreeable to plan of care and goals.     Anticipated barriers to physical therapy: none    Goals:   Short Term Goals (STG) # weeks Goal Review Date Reviewed Date Met   1. The patient will begin a written HEP 1 progressing 12/20/2022       2. Decrease soft tissue tenderness to mild 4 progressing 12/20/2022       3. Increase hip flexion to 110* 4 progressing 12/20/2022       4. The patient will begin ambulating without assistive device  4 progressing 12/20/2022          Long Term Goals (LTG) # weeks Goal Review Date Reviewed Date Met   1. The patient will be independent with a HEP for maintenance 8 progressing 12/20/2022        2. Increase hip ROM to WFL 8 progressing 12/20/2022       3. Increase left hip strength to 5/5 8 progressing 12/20/2022       4. Decrease FOTO score to 39 % 8 progressing 12/20/2022       5. The patient will be able to ascend/descend 20 step/stairs without onset of symptoms.    8 progressing 12/20/2022        PLAN     Continue with physical therapy as per plan of care      Ted Stanley, PT

## 2022-12-27 ENCOUNTER — CLINICAL SUPPORT (OUTPATIENT)
Dept: REHABILITATION | Facility: HOSPITAL | Age: 47
End: 2022-12-27
Attending: NURSE PRACTITIONER
Payer: MEDICAID

## 2022-12-27 DIAGNOSIS — R29.898 WEAKNESS OF LEFT LOWER EXTREMITY: ICD-10-CM

## 2022-12-27 DIAGNOSIS — S72.012A: Primary | ICD-10-CM

## 2022-12-27 DIAGNOSIS — R26.9 GAIT ABNORMALITY: ICD-10-CM

## 2022-12-27 DIAGNOSIS — R29.898 IMPAIRED FLEXIBILITY OF LOWER EXTREMITY: ICD-10-CM

## 2022-12-27 PROCEDURE — 97110 THERAPEUTIC EXERCISES: CPT | Mod: PN,CQ

## 2022-12-27 NOTE — PROGRESS NOTES
[facility] OUTPATIENT THERAPY AND WELLNESS   Physical Therapy Treatment Note     Name: Socorro Willis  [facility] Number: [id number]    Therapy Diagnosis:   Encounter Diagnoses   Name Primary?    Closed subcapital fracture of neck of left femur, initial encounter Yes    Gait abnormality     Weakness of left lower extremity     Impaired flexibility of lower extremity      Physician: Elva Aguilar NP    Visit Date: 12/27/2022    Physician Orders: PT Eval and Treat Weight bearing status toe touch weight bearing per Dr. Sauceda  Medical Diagnosis from Referral: Closed fracture of neck of left femur, initial encounter  Evaluation Date: 11/22/2022  Authorization Period Expiration: 12/31/2022  Plan of Care Expiration: 2/3/2023  Visit # / Visits authorized: 8/ 12   (POC 9/16)   FOTO Due visit 5  FOTO Due visit 10      Time In: 1300  Time Out: 1400  Total Billable Time: 60 minutes      Precautions: Standard and Weightbearing (Updated 12/19 - toe touch weight bearing)     Insurance: Payor: MEDICAID / [place]: [place] [employer] [place] / Product Type: Managed Medicaid /     SUBJECTIVE     Pt reports: she gets swelling from time to time in the calf, but uses ice and take ibuprofen. Reports some new popping in the lateral thigh, not painful until later.   She was compliant with home exercise program.  Response to previous treatment: no issues   Functional change: ongoing    Pain: 5/10  Location: left hip      OBJECTIVE     Objective Measures updated at progress report unless specified.     Treatment     Socorro received the treatments listed below:      During 55 minutes of therapeutic exercise, Socorro, was supervised by a rehabilitation technician under the direction of the treating therapist.      THERAPEUTIC EXERCISES to develop strength, ROM, and flexibility for 55 minutes including :        Stationary bike 10 min level 1 seat 6  Seated Hamstring stretch 3 x 30 sec Bilateral     Seated Gastroc-soleus stretch 3 x 30 sec Bilateral      Bridging 3 x 10  Straight leg raise 3 x 10  Supine SKTC with strap 3 x 10  Supine DKTC with Physioball 3 x 10  Physioball crunches 3 x 10  Physioball oblique crunch 3 x 10    Standing hip abduction 3 x 10  Standing hip extension 3 x 10  Standing marching (left only) 3 x 10    Seated marching 3 x 10  Seated long arc quads 3 x 10      Future:  Side lying adduction  Prone hip extension      Patient Education and Home Exercises     Home Exercises Provided and Patient Education Provided     Education provided:   - Importance or regular physical activity    Written Home Exercises Provided: Patient instructed to cont prior HEP. Exercises were reviewed and Socorro was able to demonstrate them prior to the end of the session.  Socorro demonstrated good  understanding of the education provided. See EMR under Patient Instructions for exercises provided during therapy sessions    ASSESSMENT     Patient tolerated session well, re-educated on TTWB with axillary crutches. Very challenged with hip 3 way today. Continue to progress strength and functional mobility.     Socorro Is progressing well towards her goals.   Pt prognosis is Good.     Pt will continue to benefit from skilled outpatient physical therapy to address the deficits listed in the problem list box on initial evaluation, provide pt/family education and to maximize pt's level of independence in the home and community environment.     Pt's spiritual, cultural and educational needs considered and pt agreeable to plan of care and goals.     Anticipated barriers to physical therapy: none    Goals:   Short Term Goals (STG) # weeks Goal Review Date Reviewed Date Met   1. The patient will begin a written HEP 1 progressing 12/27/2022       2. Decrease soft tissue tenderness to mild 4 progressing 12/27/2022       3. Increase hip flexion to 110* 4 progressing 12/27/2022       4. The patient will begin ambulating without assistive device  4 progressing 12/27/2022          Long  Term Goals (LTG) # weeks Goal Review Date Reviewed Date Met   1. The patient will be independent with a HEP for maintenance 8 progressing 12/27/2022       2. Increase hip ROM to WFL 8 progressing 12/27/2022       3. Increase left hip strength to 5/5 8 progressing 12/27/2022       4. Decrease FOTO score to 39 % 8 progressing 12/27/2022       5. The patient will be able to ascend/descend 20 step/stairs without onset of symptoms.    8 progressing 12/27/2022        PLAN     Continue with physical therapy as per plan of care      Mary Mcdonald, PTA

## 2023-01-03 ENCOUNTER — CLINICAL SUPPORT (OUTPATIENT)
Dept: REHABILITATION | Facility: HOSPITAL | Age: 48
End: 2023-01-03
Attending: NURSE PRACTITIONER
Payer: MEDICAID

## 2023-01-03 DIAGNOSIS — R26.9 GAIT ABNORMALITY: ICD-10-CM

## 2023-01-03 DIAGNOSIS — R29.898 WEAKNESS OF LEFT LOWER EXTREMITY: ICD-10-CM

## 2023-01-03 DIAGNOSIS — S72.012A: Primary | ICD-10-CM

## 2023-01-03 DIAGNOSIS — R29.898 IMPAIRED FLEXIBILITY OF LOWER EXTREMITY: ICD-10-CM

## 2023-01-03 DIAGNOSIS — M25.661 DECREASED RANGE OF MOTION OF RIGHT LOWER EXTREMITY: ICD-10-CM

## 2023-01-03 PROCEDURE — 97110 THERAPEUTIC EXERCISES: CPT | Mod: PN | Performed by: PHYSICAL THERAPIST

## 2023-01-03 NOTE — PROGRESS NOTES
[facility]   Physical Therapy Treatment Note     Name: Socorro Willis  Clinic Number: [id number]    Therapy Diagnosis:   Encounter Diagnoses   Name Primary?    Closed subcapital fracture of neck of left femur, initial encounter Yes    Gait abnormality     Weakness of left lower extremity     Impaired flexibility of lower extremity     Decreased range of motion of right lower extremity      Physician: Elva Aguilar NP    Visit Date: 1/3/2023    Physician Orders: PT Eval and Treat Weight bearing status toe touch weight bearing per Dr. Sauceda  Medical Diagnosis from Referral: Closed fracture of neck of left femur, initial encounter  Evaluation Date: 11/22/2022  Authorization Period Expiration: 12/31/2022  Plan of Care Expiration: 2/3/2023  Visit # / Visits authorized: 9/ 12   (POC 10/16)   FOTO Due visit 5  FOTO Due visit 10      Time In: 1300  Time Out: 1400  Total Billable Time: 60 minutes      Precautions: Standard and Weightbearing (Updated 13/19 - toe touch weight bearing)     Insurance: Payor: MEDICAID / [place] [employer] [place] / Product Type: Managed Medicaid /     SUBJECTIVE     Pt reports: she had to sleep in her truck over the weekend.    She was compliant with home exercise program.  Response to previous treatment: no issues   Functional change: ongoing    Pain: 5/10  Location: left hip      OBJECTIVE     Objective Measures updated at progress report unless specified.     Treatment     Socorro received the treatments listed below:        THERAPEUTIC EXERCISES to develop strength, ROM, and flexibility for 60 minutes including :        Stationary bike 10 min level 1 seat 6  Seated Hamstring stretch 3 x 30 sec Bilateral     Seated Gastroc-soleus stretch 3 x 30 sec Bilateral     Bridging 3 x 10  Straight leg raise 3 x 10  Supine SKTC with strap 3 x 10  Supine DKTC with Physioball 3 x 10  Physioball crunches 3 x 10  Physioball oblique crunch 3 x 10    Standing hip abduction 3 x  10  Standing hip extension 3 x 10      Seated marching 3 x 10  Seated long arc quads 3 x 10      Future:  Side lying adduction  Prone hip extension      Patient Education and Home Exercises     Home Exercises Provided and Patient Education Provided     Education provided:   - Importance or regular physical activity    Written Home Exercises Provided: Patient instructed to cont prior HEP. Exercises were reviewed and Socorro was able to demonstrate them prior to the end of the session.  Socorro demonstrated good  understanding of the education provided. See EMR under Patient Instructions for exercises provided during therapy sessions    ASSESSMENT     Patient still displaying difficulty with ambulating with crutches. Continue to progress strength and functional mobility.     Socorro Is progressing well towards her goals.   Pt prognosis is Good.     Pt will continue to benefit from skilled outpatient physical therapy to address the deficits listed in the problem list box on initial evaluation, provide pt/family education and to maximize pt's level of independence in the home and community environment.     Pt's spiritual, cultural and educational needs considered and pt agreeable to plan of care and goals.     Anticipated barriers to physical therapy: none    Goals:   Short Term Goals (STG) # weeks Goal Review Date Reviewed Date Met   1. The patient will begin a written HEP 1 progressing 1/3/2023       2. Decrease soft tissue tenderness to mild 4 progressing 1/3/2023       3. Increase hip flexion to 110* 4 progressing 1/3/2023       4. The patient will begin ambulating without assistive device  4 progressing 1/3/2023          Long Term Goals (LTG) # weeks Goal Review Date Reviewed Date Met   1. The patient will be independent with a HEP for maintenance 8 progressing 1/3/2023       2. Increase hip ROM to WFL 8 progressing 1/3/2023       3. Increase left hip strength to 5/5 8 progressing 1/3/2023       4. Decrease FOTO score  to 39 % 8 progressing 1/3/2023       5. The patient will be able to ascend/descend 20 step/stairs without onset of symptoms.    8 progressing 1/3/2023        PLAN     Continue with physical therapy as per plan of care      Ted Stanley, PT

## 2023-01-13 ENCOUNTER — CLINICAL SUPPORT (OUTPATIENT)
Dept: REHABILITATION | Facility: HOSPITAL | Age: 48
End: 2023-01-13
Attending: NURSE PRACTITIONER
Payer: MEDICAID

## 2023-01-13 DIAGNOSIS — M25.662 DECREASED RANGE OF MOTION OF LEFT LOWER EXTREMITY: ICD-10-CM

## 2023-01-13 DIAGNOSIS — S72.012A: Primary | ICD-10-CM

## 2023-01-13 DIAGNOSIS — R26.9 GAIT ABNORMALITY: ICD-10-CM

## 2023-01-13 DIAGNOSIS — R29.898 WEAKNESS OF LEFT LOWER EXTREMITY: ICD-10-CM

## 2023-01-13 DIAGNOSIS — R29.898 IMPAIRED FLEXIBILITY OF LOWER EXTREMITY: ICD-10-CM

## 2023-01-13 PROCEDURE — 97110 THERAPEUTIC EXERCISES: CPT | Mod: PN,CQ

## 2023-01-13 NOTE — PROGRESS NOTES
[facility]   Physical Therapy Treatment Note     Name: Socorro [facility] Number: [id number]    Therapy Diagnosis:   Encounter Diagnoses   Name Primary?    Closed subcapital fracture of neck of left femur, initial encounter Yes    Gait abnormality     Weakness of left lower extremity     Impaired flexibility of lower extremity     Decreased range of motion of left lower extremity      Physician: Elva Aguilar NP    Visit Date: 1/13/2023    Physician Orders: PT Eval and Treat Weight bearing status toe touch weight bearing per Dr. Sauceda  Medical Diagnosis from Referral: Closed fracture of neck of left femur, initial encounter  Evaluation Date: 11/22/2022  Authorization Period Expiration: 12/31/2022  Plan of Care Expiration: 2/3/2023  Visit # / Visits authorized: 10/ 12   (POC 11/16)   FOTO Due visit 5  FOTO Due visit 10      Time In: 100p  Time Out: 154p  Total Billable Time: 30 minutes      Precautions: Standard and Weightbearing (Updated 12/19 - toe touch weight bearing)     Insurance: Payor: MEDICAID / [place] [employer] [place] / Product Type: Managed Medicaid /     SUBJECTIVE     Pt reports: difficulty with donning and tying her shoes    She was compliant with home exercise program.  Response to previous treatment: positive   Functional change: ongoing    Pain: 5/10  Location: left hip      OBJECTIVE     Objective Measures updated at progress report unless specified.     Treatment     Socorro received the treatments listed below:        THERAPEUTIC EXERCISES to develop strength, ROM, and flexibility for 54 minutes including :      Stationary bike 10 min level 1 seat 5    Seated Hamstring stretch 3 x 30 sec Bilateral    NOT PERFORMED   Seated Gastroc-soleus stretch 3 x 30 sec Bilateral    NOT PERFORMED     Bridging 3 x 10  Straight leg raise 3 x 10  Supine SKTC with strap 3 x 10  Supine DKTC with Physioball (peanut) 3 x 10  Physioball (peanut) crunches 3 x 10  Physioball (peanut)  oblique crunch 3 x 10    Standing hip abduction 3 x 10  Standing hip extension 3 x 10  Standing hip flexion 3 x 10  Standing mini squats x 3 x 10    Seated marching 1# 3 x 10  Seated long arc quads 1# 3 x 10    Manual therapy: x 8 minutes   Myofacial Release with therapy bar to L hip and anterior thigh region  Passive hip flexor stretch    Future:  Side lying adduction  Prone hip extension      Patient Education and Home Exercises     Home Exercises Provided and Patient Education Provided     Education provided:   - Importance or regular physical activity    Written Home Exercises Provided: Patient instructed to cont prior HEP. Exercises were reviewed and Socorro was able to demonstrate them prior to the end of the session.  Socorro demonstrated good  understanding of the education provided. See EMR under Patient Instructions for exercises provided during therapy sessions    ASSESSMENT     Socorro remains limited in L hip Range of Motion and strength.  Performed Manual Therapy to L groin and thigh region to decrease pain in her L hip region.    Socorro Is progressing well towards her goals.   Pt prognosis is Good.     Pt will continue to benefit from skilled outpatient physical therapy to address the deficits listed in the problem list box on initial evaluation, provide pt/family education and to maximize pt's level of independence in the home and community environment.     Pt's spiritual, cultural and educational needs considered and pt agreeable to plan of care and goals.     Anticipated barriers to physical therapy: none    Goals:   Short Term Goals (STG) # weeks Goal Review Date Reviewed Date Met   1. The patient will begin a written HEP 1 progressing 1/13/2023       2. Decrease soft tissue tenderness to mild 4 progressing 1/13/2023       3. Increase hip flexion to 110* 4 progressing 1/13/2023       4. The patient will begin ambulating without assistive device  4 progressing 1/13/2023          Long Term Goals (LTG)  # weeks Goal Review Date Reviewed Date Met   1. The patient will be independent with a HEP for maintenance 8 progressing 1/13/2023       2. Increase hip ROM to WFL 8 progressing 1/13/2023       3. Increase left hip strength to 5/5 8 progressing 1/13/2023       4. Decrease FOTO score to 39 % 8 progressing 1/13/2023       5. The patient will be able to ascend/descend 20 step/stairs without onset of symptoms.    8 progressing 1/13/2023        PLAN     Continue with physical therapy as per plan of care      Debra Myers, PTA

## 2023-01-17 ENCOUNTER — DOCUMENTATION ONLY (OUTPATIENT)
Dept: REHABILITATION | Facility: HOSPITAL | Age: 48
End: 2023-01-17
Payer: MEDICAID

## 2023-01-17 DIAGNOSIS — S72.002D CLOSED FRACTURE OF NECK OF LEFT FEMUR WITH ROUTINE HEALING, SUBSEQUENT ENCOUNTER: Primary | ICD-10-CM

## 2023-01-17 NOTE — PROGRESS NOTES
30 day visit PT-PTA face-face discussion with CORWIN Stanley re: patient status, POC, and plan for progression done 1/17/23.  Debra Myers, PTA

## 2023-01-18 ENCOUNTER — CLINICAL SUPPORT (OUTPATIENT)
Dept: REHABILITATION | Facility: HOSPITAL | Age: 48
End: 2023-01-18
Payer: MEDICAID

## 2023-01-18 DIAGNOSIS — R29.898 WEAKNESS OF LEFT LOWER EXTREMITY: ICD-10-CM

## 2023-01-18 DIAGNOSIS — R26.9 GAIT ABNORMALITY: ICD-10-CM

## 2023-01-18 DIAGNOSIS — R29.898 IMPAIRED FLEXIBILITY OF LOWER EXTREMITY: ICD-10-CM

## 2023-01-18 DIAGNOSIS — S72.012A: Primary | ICD-10-CM

## 2023-01-18 DIAGNOSIS — M25.662 DECREASED RANGE OF MOTION OF LEFT LOWER EXTREMITY: ICD-10-CM

## 2023-01-18 PROCEDURE — 97110 THERAPEUTIC EXERCISES: CPT | Mod: PN | Performed by: PHYSICAL THERAPIST

## 2023-01-18 NOTE — PROGRESS NOTES
[facility]   Physical Therapy Treatment Note     Name: Socorro [facility] Number: [id number]    Therapy Diagnosis:   Encounter Diagnoses   Name Primary?    Closed subcapital fracture of neck of left femur, initial encounter Yes    Gait abnormality     Weakness of left lower extremity     Impaired flexibility of lower extremity     Decreased range of motion of left lower extremity      Physician: Elva Aguilar NP    Visit Date: 1/18/2023    Physician Orders: PT Eval and Treat Weight bearing status toe touch weight bearing per Dr. Sauceda  Medical Diagnosis from Referral: Closed fracture of neck of left femur, initial encounter  Evaluation Date: 11/22/2022  Authorization Period Expiration: 12/31/2022  Plan of Care Expiration: 2/3/2023  Visit # / Visits authorized: 8/ 12   (POC 12/16)   FOTO Due visit 5  FOTO Due visit 10      Time In: 1500  Time Out: 1600  Total Billable Time: 60 minutes      Precautions: Standard and Weightbearing (Updated 12/19 - toe touch weight bearing)     Insurance: Payor: MEDICAID / [place] [employer] [place] / Product Type: Managed Medicaid /     SUBJECTIVE     Pt reports: she can not perform Straight leg raise with shoes on   She was compliant with home exercise program.  Response to previous treatment: positive   Functional change: ongoing    Pain: 5/10  Location: left hip      OBJECTIVE     Objective Measures updated at progress report unless specified.     Treatment     Socorro received the treatments listed below:        THERAPEUTIC EXERCISES to develop strength, ROM, and flexibility for 60 minutes including :      Stationary bike 10 min level 1 seat 5    Seated Hamstring stretch 3 x 30 sec Bilateral     Seated Gastroc-soleus stretch 3 x 30 sec Bilateral       Bridging 3 x 10  Straight leg raise 3 x 10  Supine SKTC with strap 3 x 10  Supine DKTC with Physioball (peanut) 3 x 10  Physioball (peanut) crunches 3 x 10  Physioball (peanut) oblique crunch 3 x  10    Standing hip abduction 3 x 10  Standing hip extension 3 x 10  Standing hip flexion 3 x 10  Standing mini squats x 3 x 10    Seated marching 1# 3 x 10  Seated long arc quads 1# 3 x 10    Stool races 2 laps    Manual therapy: x 0 minutes   Myofacial Release with therapy bar to L hip and anterior thigh region  Passive hip flexor stretch    Future:  Side lying adduction  Prone hip extension      Patient Education and Home Exercises     Home Exercises Provided and Patient Education Provided     Education provided:   - Importance or regular physical activity    Written Home Exercises Provided: Patient instructed to cont prior HEP. Exercises were reviewed and Socorro was able to demonstrate them prior to the end of the session.  Socorro demonstrated good  understanding of the education provided. See EMR under Patient Instructions for exercises provided during therapy sessions    ASSESSMENT     Patient displays good effort with all exercises. She required verbal cues to relax quadriceps following each Straight leg raise. Patient tolerated treatment well without report of adverse effects.    Socorro Is progressing well towards her goals.   Pt prognosis is Good.     Pt will continue to benefit from skilled outpatient physical therapy to address the deficits listed in the problem list box on initial evaluation, provide pt/family education and to maximize pt's level of independence in the home and community environment.     Pt's spiritual, cultural and educational needs considered and pt agreeable to plan of care and goals.     Anticipated barriers to physical therapy: none    Goals:   Short Term Goals (STG) # weeks Goal Review Date Reviewed Date Met   1. The patient will begin a written HEP 1 progressing 1/18/2023       2. Decrease soft tissue tenderness to mild 4 progressing 1/18/2023       3. Increase hip flexion to 110* 4 progressing 1/18/2023       4. The patient will begin ambulating without assistive device  4  progressing 1/18/2023          Long Term Goals (LTG) # weeks Goal Review Date Reviewed Date Met   1. The patient will be independent with a HEP for maintenance 8 progressing 1/18/2023       2. Increase hip ROM to WFL 8 progressing 1/18/2023       3. Increase left hip strength to 5/5 8 progressing 1/18/2023       4. Decrease FOTO score to 39 % 8 progressing 1/18/2023       5. The patient will be able to ascend/descend 20 step/stairs without onset of symptoms.    8 progressing 1/18/2023        PLAN     Continue with physical therapy as per plan of care      Ted Stanley, PT

## 2023-01-20 ENCOUNTER — CLINICAL SUPPORT (OUTPATIENT)
Dept: REHABILITATION | Facility: HOSPITAL | Age: 48
End: 2023-01-20
Payer: MEDICAID

## 2023-01-20 DIAGNOSIS — R29.898 IMPAIRED FLEXIBILITY OF LOWER EXTREMITY: ICD-10-CM

## 2023-01-20 DIAGNOSIS — M25.662 DECREASED RANGE OF MOTION OF LEFT LOWER EXTREMITY: ICD-10-CM

## 2023-01-20 DIAGNOSIS — S72.012A: Primary | ICD-10-CM

## 2023-01-20 DIAGNOSIS — R29.898 WEAKNESS OF LEFT LOWER EXTREMITY: ICD-10-CM

## 2023-01-20 DIAGNOSIS — R26.9 GAIT ABNORMALITY: ICD-10-CM

## 2023-01-20 PROCEDURE — 97110 THERAPEUTIC EXERCISES: CPT | Mod: PN,CQ

## 2023-01-20 NOTE — PROGRESS NOTES
[facility]   Physical Therapy Treatment Note     Name: Socorro [facility] Number: [id number]    Therapy Diagnosis:   Encounter Diagnoses   Name Primary?    Closed subcapital fracture of neck of left femur, initial encounter Yes    Gait abnormality     Weakness of left lower extremity     Impaired flexibility of lower extremity     Decreased range of motion of left lower extremity      Physician: Elva Aguilar NP    Visit Date: 1/20/2023    Physician Orders: PT Eval and Treat Weight bearing status toe touch weight bearing per Dr. Sauceda  Medical Diagnosis from Referral: Closed fracture of neck of left femur, initial encounter  Evaluation Date: 11/22/2022  Authorization Period Expiration: 12/31/2022  Plan of Care Expiration: 2/3/2023  Visit # / Visits authorized: 9/ 12   (POC 13/16)   FOTO Due visit 5  FOTO Due visit 10      Time In: 1256p  Time Out: 151p  Total Billable Time: 55 minutes      Precautions: Standard and Weightbearing (Updated 12/19 - toe touch weight bearing)     Insurance: Payor: MEDICAID / [place] / Product Type: Managed Medicaid /     SUBJECTIVE     Pt reports: no complaints today   She was compliant with home exercise program.  Response to previous treatment: positive   Functional change: ongoing    Pain: 5/10  Location: left hip      OBJECTIVE     Objective Measures updated at progress report unless specified.     Treatment     Socorro received the treatments listed below:        THERAPEUTIC EXERCISES to develop strength, ROM, and flexibility for 55 minutes including :      Stationary bike 10 min level 1 seat 5    Seated Hamstring stretch 3 x 30 sec Bilateral     Seated Gastroc-soleus stretch 3 x 30 sec Bilateral       Bridging 3 x 10  Straight leg raise 3 x 10  Supine SKTC with strap 3 x 10  Supine DKTC with Physioball (peanut) 3 x 10  Physioball (peanut) crunches 3 x 10  Physioball (peanut) oblique crunch 3 x 10    Standing hip abduction 3 x  10  Standing hip extension 3 x 10  Standing hip flexion 3 x 10  Standing mini squats x 3 x 10    Seated marching 1# 3 x 10  Seated long arc quads 1# 3 x 10    Stool races 2 laps    Manual therapy: x 0 minutes   Myofacial Release with therapy bar to L hip and anterior thigh region  Passive hip flexor stretch    Future:  Side lying adduction  Prone hip extension      Patient Education and Home Exercises     Home Exercises Provided and Patient Education Provided     Education provided:   - Importance or regular physical activity    Written Home Exercises Provided: Patient instructed to cont prior HEP. Exercises were reviewed and Socorro was able to demonstrate them prior to the end of the session.  Socorro demonstrated good  understanding of the education provided. See EMR under Patient Instructions for exercises provided during therapy sessions    ASSESSMENT     Socorro continues to show improvement in Left Lower Extremity strength.  She continues to report pain in distal and lateral thigh region.    Socorro Is progressing well towards her goals.   Pt prognosis is Good.     Pt will continue to benefit from skilled outpatient physical therapy to address the deficits listed in the problem list box on initial evaluation, provide pt/family education and to maximize pt's level of independence in the home and community environment.     Pt's spiritual, cultural and educational needs considered and pt agreeable to plan of care and goals.     Anticipated barriers to physical therapy: none    Goals:   Short Term Goals (STG) # weeks Goal Review Date Reviewed Date Met   1. The patient will begin a written HEP 1 progressing 1/20/2023       2. Decrease soft tissue tenderness to mild 4 progressing 1/20/2023       3. Increase hip flexion to 110* 4 progressing 1/20/2023       4. The patient will begin ambulating without assistive device  4 progressing 1/20/2023          Long Term Goals (LTG) # weeks Goal Review Date Reviewed Date Met   1.  The patient will be independent with a HEP for maintenance 8 progressing 1/20/2023       2. Increase hip ROM to WFL 8 progressing 1/20/2023       3. Increase left hip strength to 5/5 8 progressing 1/20/2023       4. Decrease FOTO score to 39 % 8 progressing 1/20/2023       5. The patient will be able to ascend/descend 20 step/stairs without onset of symptoms.    8 progressing 1/20/2023        PLAN     Continue with physical therapy as per plan of care      Debra Myers, PTA

## 2023-01-23 ENCOUNTER — HOSPITAL ENCOUNTER (OUTPATIENT)
Dept: RADIOLOGY | Facility: HOSPITAL | Age: 48
Discharge: HOME | End: 2023-01-23
Attending: ORTHOPAEDIC SURGERY
Payer: MEDICAID

## 2023-01-23 ENCOUNTER — OFFICE VISIT (OUTPATIENT)
Dept: ORTHOPEDICS | Facility: CLINIC | Age: 48
End: 2023-01-23
Payer: MEDICAID

## 2023-01-23 DIAGNOSIS — S72.002D CLOSED FRACTURE OF NECK OF LEFT FEMUR WITH ROUTINE HEALING, SUBSEQUENT ENCOUNTER: Primary | ICD-10-CM

## 2023-01-23 DIAGNOSIS — S72.002D CLOSED FRACTURE OF NECK OF LEFT FEMUR WITH ROUTINE HEALING, SUBSEQUENT ENCOUNTER: ICD-10-CM

## 2023-01-23 PROCEDURE — 73502 X-RAY EXAM HIP UNI 2-3 VIEWS: CPT | Mod: TC,PN,LT

## 2023-01-23 PROCEDURE — 73502 X-RAY EXAM HIP UNI 2-3 VIEWS: CPT | Mod: 26,LT,, | Performed by: RADIOLOGY

## 2023-01-23 PROCEDURE — 1159F PR MEDICATION LIST DOCUMENTED IN MEDICAL RECORD: ICD-10-PCS | Mod: CPTII,,, | Performed by: ORTHOPAEDIC SURGERY

## 2023-01-23 PROCEDURE — 1160F RVW MEDS BY RX/DR IN RCRD: CPT | Mod: CPTII,,, | Performed by: ORTHOPAEDIC SURGERY

## 2023-01-23 PROCEDURE — 73502 XR HIP WITH PELVIS WHEN PERFORMED, 2 OR 3 VIEWS LEFT: ICD-10-PCS | Mod: 26,LT,, | Performed by: RADIOLOGY

## 2023-01-23 PROCEDURE — 99999 PR PBB SHADOW E&M-EST. PATIENT-LVL I: ICD-10-PCS | Mod: PBBFAC,,, | Performed by: ORTHOPAEDIC SURGERY

## 2023-01-23 PROCEDURE — 99024 PR POST-OP FOLLOW-UP VISIT: ICD-10-PCS | Mod: ,,, | Performed by: ORTHOPAEDIC SURGERY

## 2023-01-23 PROCEDURE — 99211 OFF/OP EST MAY X REQ PHY/QHP: CPT | Mod: PBBFAC,PN | Performed by: ORTHOPAEDIC SURGERY

## 2023-01-23 PROCEDURE — 1160F PR REVIEW ALL MEDS BY PRESCRIBER/CLIN PHARMACIST DOCUMENTED: ICD-10-PCS | Mod: CPTII,,, | Performed by: ORTHOPAEDIC SURGERY

## 2023-01-23 PROCEDURE — 99024 POSTOP FOLLOW-UP VISIT: CPT | Mod: ,,, | Performed by: ORTHOPAEDIC SURGERY

## 2023-01-23 PROCEDURE — 99999 PR PBB SHADOW E&M-EST. PATIENT-LVL I: CPT | Mod: PBBFAC,,, | Performed by: ORTHOPAEDIC SURGERY

## 2023-01-23 PROCEDURE — 1159F MED LIST DOCD IN RCRD: CPT | Mod: CPTII,,, | Performed by: ORTHOPAEDIC SURGERY

## 2023-01-23 NOTE — PROGRESS NOTES
Past Medical History:   Diagnosis Date    Back injury     Sciatic nerve pain        Past Surgical History:   Procedure Laterality Date    OPEN REDUCTION AND INTERNAL FIXATION (ORIF) OF INTERTROCHANTERIC FRACTURE OF FEMUR Left 11/6/2022    Procedure: ORIF, FRACTURE, FEMUR, INTERTROCHANTERIC;  Surgeon: Mango Sauceda MD;  Location: [facility];  Service: Orthopedics;  Laterality: Left;    TUBAL LIGATION         Current Outpatient Medications   Medication Sig    aspirin (ECOTRIN) 81 MG EC tablet Take 1 tablet (81 mg total) by mouth 2 (two) times a day.    HYDROcodone-acetaminophen (NORCO) 5-325 mg per tablet Take 1 tablet by mouth every 4 (four) hours as needed for Pain.     No current facility-administered medications for this visit.       Review of patient's allergies indicates:  No Known Allergies    Family History   Problem Relation Age of Onset    Heart disease Father        Social History     Socioeconomic History    Marital status:    Tobacco Use    Smoking status: Every Day     Packs/day: 1.00     Types: Cigarettes    Smokeless tobacco: Never   Substance and Sexual Activity    Alcohol use: Yes     Alcohol/week: 14.0 standard drinks     Types: 14 Cans of beer per week    Drug use: Not Currently     Social Determinants of Health     Financial Resource Strain: Unknown    Difficulty of Paying Living Expenses: Patient refused   Food Insecurity: Unknown    Worried About Running Out of Food in the Last Year: Patient refused    Ran Out of Food in the Last Year: Patient refused   Transportation Needs: Unknown    Lack of Transportation (Medical): Patient refused    Lack of Transportation (Non-Medical): Patient refused   Physical Activity: Unknown    Days of Exercise per Week: Patient refused    Minutes of Exercise per Session: Patient refused   Stress: Unknown    Feeling of Stress : Patient refused   Social Connections: Unknown    Frequency of Communication with Friends and Family: Patient refused     Frequency of Social Gatherings with Friends and Family: Patient refused    Attends Yazidism Services: Patient refused    Active Member of Clubs or Organizations: Patient refused    Attends Club or Organization Meetings: Patient refused    Marital Status: Patient refused   Housing Stability: Unknown    Unable to Pay for Housing in the Last Year: Patient refused    Unstable Housing in the Last Year: Patient refused       Chief Complaint:   No chief complaint on file.      Date of surgery:  November 6, 2022    History of present illness:  47-year-old female seen after IM nailing of a left intertrochanteric femur fracture.  She is doing pretty well.  Has a fair amount of pain.  Pain is an 5/10.  Worse at night.  Doing physical therapy.  Using the crutches.      Review of Systems:    Musculoskeletal:  See HPI        Physical Examination:    Vital Signs:    There were no vitals filed for this visit.      There is no height or weight on file to calculate BMI.    This a well-developed, well nourished patient in no acute distress.  They are alert and oriented and cooperative to examination.  Pt. walks with a mild antalgic gait using crutches.    Examination left hip shows healed surgical incisions.  No erythema drainage.  Patient is neurovascularly intact.  Decent range of motion of the hip.    X-rays:  X-rays left femur ordered and reviewed which show  intertrochanteric femur fracture with robin and screws still in position but there definitely has been some movement of the femoral neck fracture into a little more varus compared to The 1st sets of x-rays.  No progression since last month though.     Assessment::  Status post IM nailing of left intertrochanteric femur fracture    Plan:  Reviewed the findings with her today.  Continue weight-bearing restrictions using crutches.   Follow-up in 4 weeks with another x-ray of the left hip.    This note was created using Octoshape voice recognition software that occasionally  misinterpreted phrases or words.

## 2023-01-24 ENCOUNTER — CLINICAL SUPPORT (OUTPATIENT)
Dept: REHABILITATION | Facility: HOSPITAL | Age: 48
End: 2023-01-24
Payer: MEDICAID

## 2023-01-24 DIAGNOSIS — R26.9 GAIT ABNORMALITY: ICD-10-CM

## 2023-01-24 DIAGNOSIS — R29.898 WEAKNESS OF LEFT LOWER EXTREMITY: ICD-10-CM

## 2023-01-24 DIAGNOSIS — S72.012A: Primary | ICD-10-CM

## 2023-01-24 DIAGNOSIS — M25.662 DECREASED RANGE OF MOTION OF LEFT LOWER EXTREMITY: ICD-10-CM

## 2023-01-24 DIAGNOSIS — R29.898 IMPAIRED FLEXIBILITY OF LOWER EXTREMITY: ICD-10-CM

## 2023-01-24 PROCEDURE — 97110 THERAPEUTIC EXERCISES: CPT | Mod: PN,CQ

## 2023-01-24 NOTE — PROGRESS NOTES
[facility]   Physical Therapy Treatment Note     Name: [facility] Number: [id number]    Therapy Diagnosis:   Encounter Diagnoses   Name Primary?    Closed subcapital fracture of neck of left femur, initial encounter Yes    Gait abnormality     Weakness of left lower extremity     Impaired flexibility of lower extremity     Decreased range of motion of left lower extremity      Physician: Elva Aguilar NP    Visit Date: 1/24/2023    Physician Orders: PT Eval and Treat Weight bearing status toe touch weight bearing per Dr. Sauceda  Medical Diagnosis from Referral: Closed fracture of neck of left femur, initial encounter  Evaluation Date: 11/22/2022  Authorization Period Expiration: 12/31/2022  Plan of Care Expiration: 2/3/2023  Visit # / Visits authorized: 10/ 12   (POC 14/16)   FOTO Due visit 5  FOTO Due visit 10      Time In: 100p  Time Out: 156p  Total Billable Time: 56 minutes      Precautions: Standard and Weightbearing (Updated 12/19 - toe touch weight bearing)     Insurance: Payor: MEDICAID / Plan: [employer] CONNECT / Product Type: Managed Medicaid /     SUBJECTIVE     Pt reports: no complaints today   She was compliant with home exercise program.  Response to previous treatment: positive   Functional change: ongoing    Pain: 3/10  Location: left hip      OBJECTIVE     Objective Measures updated at progress report unless specified.     Treatment     Socorro received the treatments listed below:        THERAPEUTIC EXERCISES to develop strength, ROM, and flexibility for 56 minutes including :      Stationary bike 10 min level 1 seat 5    Seated Hamstring stretch 3 x 30 sec Bilateral     NOT PERFORMED   Seated Gastroc-soleus stretch 3 x 30 sec Bilateral      NOT PERFORMED     Bridging Green Theraband 3 x 10  +Hip abduction Green Theraband x 30  +Sidelying hip External Rotation  2 x 10  +Prone hip extension 2 x 10  Straight leg raise 3 x 10  Supine SKTC with strap 3 x  10  Supine DKTC with Physioball (peanut) 3 x 10  Physioball (peanut) crunches 3 x 10  Physioball (peanut) oblique crunch 3 x 10    NOT PERFORMED below  Standing hip abduction 3 x 10  Standing hip extension 3 x 10  Standing hip flexion 3 x 10  Standing mini squats x 3 x 10    Seated marching 2# 3 x 10  Seated long arc quads 2# 3 x 10  +Hamstring curls Green Theraband x 30     Stool races 2 laps    Manual therapy: x 0 minutes  Myofacial Release with therapy bar to L hip and anterior thigh region  Passive hip flexor stretch    Future:  Side lying adduction      Patient Education and Home Exercises     Home Exercises Provided and Patient Education Provided     Education provided:   - Importance or regular physical activity    Written Home Exercises Provided: Patient instructed to cont prior HEP. Exercises were reviewed and Socorro was able to demonstrate them prior to the end of the session.  Socorro demonstrated good  understanding of the education provided. See EMR under Patient Instructions for exercises provided during therapy sessions    ASSESSMENT     Socorro is able to perform Straight Leg Raise with her shoe donned.  She is unable to lift her Left Lower Extremity from a seated position to the top of the plinth without flexing her knee or assisting with her R LE.  Decreased strength of L hip External Rotators.    Added additional hip External Rotator and hip extensor muscle strengthening.  Good tolerance for PRE's.    Socorro Is progressing well towards her goals.   Pt prognosis is Good.     Pt will continue to benefit from skilled outpatient physical therapy to address the deficits listed in the problem list box on initial evaluation, provide pt/family education and to maximize pt's level of independence in the home and community environment.     Pt's spiritual, cultural and educational needs considered and pt agreeable to plan of care and goals.     Anticipated barriers to physical therapy: none    Goals:   Short  Term Goals (STG) # weeks Goal Review Date Reviewed Date Met   1. The patient will begin a written HEP 1 progressing 1/24/2023       2. Decrease soft tissue tenderness to mild 4 progressing 1/24/2023       3. Increase hip flexion to 110* 4 progressing 1/24/2023       4. The patient will begin ambulating without assistive device  4 progressing 1/24/2023          Long Term Goals (LTG) # weeks Goal Review Date Reviewed Date Met   1. The patient will be independent with a HEP for maintenance 8 progressing 1/24/2023       2. Increase hip ROM to WFL 8 progressing 1/24/2023       3. Increase left hip strength to 5/5 8 progressing 1/24/2023       4. Decrease FOTO score to 39 % 8 progressing 1/24/2023       5. The patient will be able to ascend/descend 20 step/stairs without onset of symptoms.    8 progressing 1/24/2023        PLAN     Continue with physical therapy as per plan of care      Susan B Gallo, PTA [facility]   Physical Therapy Treatment Note     [id number]    Therapy Diagnosis:   Encounter Diagnoses   Name Primary?    Closed subcapital fracture of neck of left femur, initial encounter Yes    Gait abnormality     Weakness of left lower extremity     Impaired flexibility of lower extremity     Decreased range of motion of left lower extremity      Physician: Elva Aguilar NP    Visit Date: 1/24/2023    Physician Orders: PT Eval and Treat Weight bearing status toe touch weight bearing per Dr. Sauceda  Medical Diagnosis from Referral: Closed fracture of neck of left femur, initial encounter  Evaluation Date: 11/22/2022  Authorization Period Expiration: 12/31/2022  Plan of Care Expiration: 2/3/2023  Visit # / Visits authorized: 9/ 12   (POC 13/16)   FOTO Due visit 5  FOTO Due visit 10      Time In: 1256p  Time Out: 151p  Total Billable Time: 55 minutes      Precautions: Standard and Weightbearing (Updated 12/19 - toe touch weight  bearing)     Insurance: Payor: MEDICAID / Plan: [place] [employer] CONNECT / Product Type: Managed Medicaid /     SUBJECTIVE     Pt reports: no complaints today   She was compliant with home exercise program.  Response to previous treatment: positive   Functional change: ongoing    Pain: 5/10  Location: left hip      OBJECTIVE     Objective Measures updated at progress report unless specified.     Treatment     Socorro received the treatments listed below:        THERAPEUTIC EXERCISES to develop strength, ROM, and flexibility for 55 minutes including :      Stationary bike 10 min level 1 seat 5    Seated Hamstring stretch 3 x 30 sec Bilateral     Seated Gastroc-soleus stretch 3 x 30 sec Bilateral       Bridging 3 x 10  Straight leg raise 3 x 10  Supine SKTC with strap 3 x 10  Supine DKTC with Physioball (peanut) 3 x 10  Physioball (peanut) crunches 3 x 10  Physioball (peanut) oblique crunch 3 x 10    Standing hip abduction 3 x 10  Standing hip extension 3 x 10  Standing hip flexion 3 x 10  Standing mini squats x 3 x 10    Seated marching 1# 3 x 10  Seated long arc quads 1# 3 x 10    Stool races 2 laps    Manual therapy: x 0 minutes   Myofacial Release with therapy bar to L hip and anterior thigh region  Passive hip flexor stretch    Future:  Side lying adduction  Prone hip extension      Patient Education and Home Exercises     Home Exercises Provided and Patient Education Provided     Education provided:   - Importance or regular physical activity    Written Home Exercises Provided: Patient instructed to cont prior HEP. Exercises were reviewed and Socorro was able to demonstrate them prior to the end of the session.  Socorro demonstrated good  understanding of the education provided. See EMR under Patient Instructions for exercises provided during therapy sessions    ASSESSMENT     Socorro continues to show improvement in Left Lower Extremity strength.  She continues to report pain in distal and lateral thigh  carmella Quintanilla Is progressing well towards her goals.   Pt prognosis is Good.     Pt will continue to benefit from skilled outpatient physical therapy to address the deficits listed in the problem list box on initial evaluation, provide pt/family education and to maximize pt's level of independence in the home and community environment.     Pt's spiritual, cultural and educational needs considered and pt agreeable to plan of care and goals.     Anticipated barriers to physical therapy: none    Goals:   Short Term Goals (STG) # weeks Goal Review Date Reviewed Date Met   1. The patient will begin a written HEP 1 progressing 1/24/2023       2. Decrease soft tissue tenderness to mild 4 progressing 1/24/2023       3. Increase hip flexion to 110* 4 progressing 1/24/2023       4. The patient will begin ambulating without assistive device  4 progressing 1/24/2023          Long Term Goals (LTG) # weeks Goal Review Date Reviewed Date Met   1. The patient will be independent with a HEP for maintenance 8 progressing 1/24/2023       2. Increase hip ROM to WFL 8 progressing 1/24/2023       3. Increase left hip strength to 5/5 8 progressing 1/24/2023       4. Decrease FOTO score to 39 % 8 progressing 1/24/2023       5. The patient will be able to ascend/descend 20 step/stairs without onset of symptoms.    8 progressing 1/24/2023        PLAN     Continue with physical therapy as per plan of care      Debra Myers, PTA

## 2023-01-27 ENCOUNTER — CLINICAL SUPPORT (OUTPATIENT)
Dept: REHABILITATION | Facility: HOSPITAL | Age: 48
End: 2023-01-27
Payer: MEDICAID

## 2023-01-27 DIAGNOSIS — S72.012A: Primary | ICD-10-CM

## 2023-01-27 DIAGNOSIS — R26.9 GAIT ABNORMALITY: ICD-10-CM

## 2023-01-27 DIAGNOSIS — R29.898 IMPAIRED FLEXIBILITY OF LOWER EXTREMITY: ICD-10-CM

## 2023-01-27 DIAGNOSIS — M25.662 DECREASED RANGE OF MOTION OF LEFT LOWER EXTREMITY: ICD-10-CM

## 2023-01-27 DIAGNOSIS — R29.898 WEAKNESS OF LEFT LOWER EXTREMITY: ICD-10-CM

## 2023-01-27 PROCEDURE — 97110 THERAPEUTIC EXERCISES: CPT | Mod: PN,CQ

## 2023-01-27 NOTE — PROGRESS NOTES
[facility]   Physical Therapy Treatment Note     Name: Socorro [facility] Number: [id number]    Therapy Diagnosis:   Encounter Diagnoses   Name Primary?    Closed subcapital fracture of neck of left femur, initial encounter Yes    Gait abnormality     Weakness of left lower extremity     Impaired flexibility of lower extremity     Decreased range of motion of left lower extremity      Physician: Elva Aguilar NP    Visit Date: 1/27/2023    Physician Orders: PT Eval and Treat Weight bearing status toe touch weight bearing per Dr. Sauceda  Medical Diagnosis from Referral: Closed fracture of neck of left femur, initial encounter  Evaluation Date: 11/22/2022  Authorization Period Expiration: 12/31/2022  Plan of Care Expiration: 2/3/2023  Visit # / Visits authorized: 11/ 12   (POC 15/16)   FOTO Due visit 5  FOTO Due visit 10      Time In: 159p  Time Out: 257p  Total Billable Time: 58 minutes      Precautions: Standard and Weightbearing (Updated 12/19 - toe touch weight bearing)     Insurance: Payor: MEDICAID / Plan: [place] [employer] CONNECT / Product Type: Managed Medicaid /     SUBJECTIVE     Pt reports: she has been working on sidelying  She was compliant with home exercise program.  Response to previous treatment: good  Functional change: ongoing    Pain: 2/10  Location: left hip      OBJECTIVE     Objective Measures updated at progress report unless specified.     Treatment     Socorro received the treatments listed below:        THERAPEUTIC EXERCISES to develop strength, ROM, and flexibility for 58 minutes including :      Stationary bike 10 min level 1 seat 5    Seated Hamstring stretch 3 x 30 sec Bilateral     NOT PERFORMED   Seated Gastroc-soleus stretch 3 x 30 sec Bilateral      NOT PERFORMED     Bridging Green Theraband 3 x 10  Hip abduction Green Theraband x 30  Sidelying hip External Rotation  3 x 10  Prone hip extension 2 x 10  Straight leg raise 3 x 10  Supine SKTC with strap 3 x  10   NOT PERFORMED   Supine DKTC with Physioball (peanut) 3 x 10  Physioball (peanut) crunches 3 x 10  Physioball (peanut) oblique crunch 3 x 10    NOT PERFORMED below  Standing hip abduction 3 x 10  Standing hip extension 3 x 10  Standing hip flexion 3 x 10  Standing mini squats x 3 x 10    Seated marching 2# 3 x 10  Seated long arc quads 2# 3 x 10  Hamstring curls Green Theraband x 30     Shuttle: 37# Bilateral x 30  Shuttle: 37# heel raises x 25    Stool races 2 laps  NOT PERFORMED     Manual therapy: x 0 minutes  Myofacial Release with therapy bar to L hip and anterior thigh region  Passive hip flexor stretch    Future:  Side lying adduction      Patient Education and Home Exercises     Home Exercises Provided and Patient Education Provided     Education provided:   - Importance or regular physical activity    Written Home Exercises Provided: Patient instructed to cont prior HEP. Exercises were reviewed and Socorro was able to demonstrate them prior to the end of the session.  Socorro demonstrated good  understanding of the education provided. See EMR under Patient Instructions for exercises provided during therapy sessions    ASSESSMENT     Socorro showed improved tolerance to sidelying hip External Rotation.  Fatigue with shuttle today and no reported pain.  Good tolerance for PRE's.  Making good progress with strength.    Socorro Is progressing well towards her goals.   Pt prognosis is Good.     Pt will continue to benefit from skilled outpatient physical therapy to address the deficits listed in the problem list box on initial evaluation, provide pt/family education and to maximize pt's level of independence in the home and community environment.     Pt's spiritual, cultural and educational needs considered and pt agreeable to plan of care and goals.     Anticipated barriers to physical therapy: none    Goals:   Short Term Goals (STG) # weeks Goal Review Date Reviewed Date Met   1. The patient will begin a  written HEP 1 progressing 1/27/2023       2. Decrease soft tissue tenderness to mild 4 progressing 1/27/2023       3. Increase hip flexion to 110* 4 progressing 1/27/2023       4. The patient will begin ambulating without assistive device  4 progressing 1/27/2023          Long Term Goals (LTG) # weeks Goal Review Date Reviewed Date Met   1. The patient will be independent with a HEP for maintenance 8 progressing 1/27/2023       2. Increase hip ROM to WFL 8 progressing 1/27/2023       3. Increase left hip strength to 5/5 8 progressing 1/27/2023       4. Decrease FOTO score to 39 % 8 progressing 1/27/2023       5. The patient will be able to ascend/descend 20 step/stairs without onset of symptoms.    8 progressing 1/27/2023        PLAN     Continue with physical therapy as per plan of care      Susan B Gallo, PTA [facility]   Physical Therapy Treatment Note     Name: [facility] Number: [id number]    Therapy Diagnosis:   Encounter Diagnoses   Name Primary?    Closed subcapital fracture of neck of left femur, initial encounter Yes    Gait abnormality     Weakness of left lower extremity     Impaired flexibility of lower extremity     Decreased range of motion of left lower extremity      Physician: Elva Aguilar NP    Visit Date: 1/27/2023    Physician Orders: PT Eval and Treat Weight bearing status toe touch weight bearing per Dr. Sauceda  Medical Diagnosis from Referral: Closed fracture of neck of left femur, initial encounter  Evaluation Date: 11/22/2022  Authorization Period Expiration: 12/31/2022  Plan of Care Expiration: 2/3/2023  Visit # / Visits authorized: 9/ 12   (POC 13/16)   FOTO Due visit 5  FOTO Due visit 10      Time In: 1256p  Time Out: 151p  Total Billable Time: 55 minutes      Precautions: Standard and Weightbearing (Updated 12/19 - toe touch weight bearing)     Insurance: Payor: MEDICAID / Plan[place] [employer] CONNE[place] / Product Type:  Managed Medicaid /     SUBJECTIVE     Pt reports: no complaints today   She was compliant with home exercise program.  Response to previous treatment: positive   Functional change: ongoing    Pain: 5/10  Location: left hip      OBJECTIVE     Objective Measures updated at progress report unless specified.     Treatment     Socorro received the treatments listed below:        THERAPEUTIC EXERCISES to develop strength, ROM, and flexibility for 55 minutes including :      Stationary bike 10 min level 1 seat 5    Seated Hamstring stretch 3 x 30 sec Bilateral     Seated Gastroc-soleus stretch 3 x 30 sec Bilateral       Bridging 3 x 10  Straight leg raise 3 x 10  Supine SKTC with strap 3 x 10  Supine DKTC with Physioball (peanut) 3 x 10  Physioball (peanut) crunches 3 x 10  Physioball (peanut) oblique crunch 3 x 10    Standing hip abduction 3 x 10  Standing hip extension 3 x 10  Standing hip flexion 3 x 10  Standing mini squats x 3 x 10    Seated marching 1# 3 x 10  Seated long arc quads 1# 3 x 10    Stool races 2 laps    Manual therapy: x 0 minutes   Myofacial Release with therapy bar to L hip and anterior thigh region  Passive hip flexor stretch    Future:  Side lying adduction  Prone hip extension      Patient Education and Home Exercises     Home Exercises Provided and Patient Education Provided     Education provided:   - Importance or regular physical activity    Written Home Exercises Provided: Patient instructed to cont prior HEP. Exercises were reviewed and Socorro was able to demonstrate them prior to the end of the session.  Socorro demonstrated good  understanding of the education provided. See EMR under Patient Instructions for exercises provided during therapy sessions    ASSESSMENT     Socorro continues to show improvement in Left Lower Extremity strength.  She continues to report pain in distal and lateral thigh region.    Socorro Is progressing well towards her goals.   Pt prognosis is Good.     Pt will  continue to benefit from skilled outpatient physical therapy to address the deficits listed in the problem list box on initial evaluation, provide pt/family education and to maximize pt's level of independence in the home and community environment.     Pt's spiritual, cultural and educational needs considered and pt agreeable to plan of care and goals.     Anticipated barriers to physical therapy: none    Goals:   Short Term Goals (STG) # weeks Goal Review Date Reviewed Date Met   1. The patient will begin a written HEP 1 progressing 1/27/2023       2. Decrease soft tissue tenderness to mild 4 progressing 1/27/2023       3. Increase hip flexion to 110* 4 progressing 1/27/2023       4. The patient will begin ambulating without assistive device  4 progressing 1/27/2023          Long Term Goals (LTG) # weeks Goal Review Date Reviewed Date Met   1. The patient will be independent with a HEP for maintenance 8 progressing 1/27/2023       2. Increase hip ROM to WFL 8 progressing 1/27/2023       3. Increase left hip strength to 5/5 8 progressing 1/27/2023       4. Decrease FOTO score to 39 % 8 progressing 1/27/2023       5. The patient will be able to ascend/descend 20 step/stairs without onset of symptoms.    8 progressing 1/27/2023        PLAN     Continue with physical therapy as per plan of care      Debra Myers, PTA

## 2023-01-31 ENCOUNTER — CLINICAL SUPPORT (OUTPATIENT)
Dept: REHABILITATION | Facility: HOSPITAL | Age: 48
End: 2023-01-31
Payer: MEDICAID

## 2023-01-31 DIAGNOSIS — S72.012A: Primary | ICD-10-CM

## 2023-01-31 DIAGNOSIS — M25.662 DECREASED RANGE OF MOTION OF LEFT LOWER EXTREMITY: ICD-10-CM

## 2023-01-31 DIAGNOSIS — R26.9 GAIT ABNORMALITY: ICD-10-CM

## 2023-01-31 DIAGNOSIS — R29.898 IMPAIRED FLEXIBILITY OF LOWER EXTREMITY: ICD-10-CM

## 2023-01-31 DIAGNOSIS — R29.898 WEAKNESS OF LEFT LOWER EXTREMITY: ICD-10-CM

## 2023-01-31 PROCEDURE — 97110 THERAPEUTIC EXERCISES: CPT | Mod: PN,CQ

## 2023-01-31 NOTE — PROGRESS NOTES
[facility]   Physical Therapy Treatment Note     Name: Socorro Willis  [facility] Number: [id number]    Therapy Diagnosis:   Encounter Diagnoses   Name Primary?    Closed subcapital fracture of neck of left femur, initial encounter Yes    Gait abnormality     Weakness of left lower extremity     Impaired flexibility of lower extremity     Decreased range of motion of left lower extremity      Physician: Elva Aguilar NP    Visit Date: 1/31/2023    Physician Orders: PT Eval and Treat Weight bearing status toe touch weight bearing per Dr. Sauceda  Medical Diagnosis from Referral: Closed fracture of neck of left femur, initial encounter  Evaluation Date: 11/22/2022  Authorization Period Expiration: 12/31/2022  Plan of Care Expiration: 2/3/2023  Visit # / Visits authorized: 11/ 12   (POC 16/16)   FOTO Due visit 5  FOTO Due visit 10      Time In: 1256p  Time Out: 154p  Total Billable Time: 58 minutes      Precautions: Standard and Weightbearing (Updated 12/19 - toe touch weight bearing)     Insurance: Payor: MEDICAID / Plan: [employer] / Product Type: Managed Medicaid /     SUBJECTIVE     Pt reports: she was going down her stairs and slipped, hip started hurting after that, she asked about weightbearing status, she states she is not compliant with her weight bearing precautions  She was compliant with home exercise program.  Response to previous treatment: good  Functional change: ongoing    Pain: 5/10  Location: left hip      OBJECTIVE     Objective Measures updated at progress report unless specified.     Treatment     Socorro received the treatments listed below:      During 58 minutes of therapeutic exercise, Socorro, was supervised by a rehabilitation technician under the direction of the treating therapist.    THERAPEUTIC EXERCISES to develop strength, ROM, and flexibility for 55 minutes including :      Stationary bike 10 min level 1 seat 5    Seated Hamstring stretch 3 x 30  sec Bilateral     NOT PERFORMED   Seated Gastroc-soleus stretch 3 x 30 sec Bilateral      NOT PERFORMED     Bridging Green Theraband 3 x 10  Hip abduction Green Theraband x 30  Sidelying hip External Rotation  3 x 10  Prone hip extension 2 x 10  Straight leg raise 3 x 10  Supine SKTC with strap 3 x 10   NOT PERFORMED   Supine DKTC with Physioball (peanut) 3 x 10  Physioball (peanut) crunches 3 x 10  Physioball (peanut) oblique crunch 3 x 10    NOT PERFORMED below  Standing hip abduction 3 x 10  Standing hip extension 3 x 10  Standing hip flexion 3 x 10  Standing mini squats x 3 x 10    Seated marching 2# 3 x 10 - no weight today  Seated long arc quads 2# 3 x 10  Hamstring curls Green Theraband x 30     Shuttle: 37# Bilateral x 30  Shuttle: 37# heel raises x 30    Stool races 2 laps  NOT PERFORMED     Manual therapy: x 3 minutes  Myofacial Release with therapy bar to L hip and anterior thigh region  Passive hip flexor stretch  Myofacial Release with ball to L groin region while in hip flexor stretch    Future:  Side lying adduction      Patient Education and Home Exercises     Home Exercises Provided and Patient Education Provided     Education provided:   - Importance or regular physical activity    Written Home Exercises Provided: Patient instructed to cont prior HEP. Exercises were reviewed and Socorro was able to demonstrate them prior to the end of the session.  Socorro demonstrated good  understanding of the education provided. See EMR under Patient Instructions for exercises provided during therapy sessions    ASSESSMENT     Socorro arrived with increased pain after slipping on her stairs at home.  She was able to perform all PRE's with some adjustments as listed in the exercise list.  Reviewed TTWB precautions.    Socorro Is progressing well towards her goals.   Pt prognosis is Good.     Pt will continue to benefit from skilled outpatient physical therapy to address the deficits listed in the problem list box on  initial evaluation, provide pt/family education and to maximize pt's level of independence in the home and community environment.     Pt's spiritual, cultural and educational needs considered and pt agreeable to plan of care and goals.     Anticipated barriers to physical therapy: none    Goals:   Short Term Goals (STG) # weeks Goal Review Date Reviewed Date Met   1. The patient will begin a written HEP 1 progressing 1/31/2023       2. Decrease soft tissue tenderness to mild 4 progressing 1/31/2023       3. Increase hip flexion to 110* 4 progressing 1/31/2023       4. The patient will begin ambulating without assistive device  4 progressing 1/31/2023          Long Term Goals (LTG) # weeks Goal Review Date Reviewed Date Met   1. The patient will be independent with a HEP for maintenance 8 progressing 1/31/2023       2. Increase hip ROM to WFL 8 progressing 1/31/2023       3. Increase left hip strength to 5/5 8 progressing 1/31/2023       4. Decrease FOTO score to 39 % 8 progressing 1/31/2023       5. The patient will be able to ascend/descend 20 step/stairs without onset of symptoms.    8 progressing 1/31/2023        PLAN     Continue with physical therapy as per plan of care      Debra Myers, PTA

## 2023-02-13 ENCOUNTER — CLINICAL SUPPORT (OUTPATIENT)
Dept: REHABILITATION | Facility: HOSPITAL | Age: 48
End: 2023-02-13
Payer: MEDICAID

## 2023-02-13 DIAGNOSIS — S72.012A: Primary | ICD-10-CM

## 2023-02-13 DIAGNOSIS — R29.898 WEAKNESS OF LEFT LOWER EXTREMITY: ICD-10-CM

## 2023-02-13 DIAGNOSIS — R29.898 IMPAIRED FLEXIBILITY OF LOWER EXTREMITY: ICD-10-CM

## 2023-02-13 DIAGNOSIS — M25.662 DECREASED RANGE OF MOTION OF LEFT LOWER EXTREMITY: ICD-10-CM

## 2023-02-13 DIAGNOSIS — R26.9 GAIT ABNORMALITY: ICD-10-CM

## 2023-02-13 PROCEDURE — 97110 THERAPEUTIC EXERCISES: CPT | Mod: PN | Performed by: PHYSICAL THERAPIST

## 2023-02-13 NOTE — PROGRESS NOTES
[facility]   Physical Therapy Treatment Note     Name: Socorro Willis  Clinic Number: [id number]    Therapy Diagnosis:   Encounter Diagnoses   Name Primary?    Closed subcapital fracture of neck of left femur, initial encounter Yes    Gait abnormality     Weakness of left lower extremity     Impaired flexibility of lower extremity     Decreased range of motion of left lower extremity      Physician: Elva Aguilar NP    Visit Date: 2/13/2023    Physician Orders: PT Eval and Treat Weight bearing status toe touch weight bearing per Dr. Sauceda  Medical Diagnosis from Referral: Closed fracture of neck of left femur, initial encounter  Evaluation Date: 11/22/2022  Authorization Period Expiration: 12/31/2022  Plan of Care Expiration: 2/3/2023  Visit # / Visits authorized: 12/ 12   (POC 16/16)   FOTO Due visit 5  FOTO Due visit 10      Time In: 1300  Time Out: 1400  Total Billable Time: 60 minutes      Precautions: Standard and Weightbearing (Updated 12/19 - toe touch weight bearing)     Insurance: Payor: MEDICAID / Plan: [place] [employer] / Product Type: Managed Medicaid /     SUBJECTIVE     Pt reports: continued left hip pain.   She was compliant with home exercise program.  Response to previous treatment: good  Functional change: ongoing    Pain: 5/10  Location: left hip      OBJECTIVE     See update plan of care     Treatment     Socorro received the treatments listed below:      During 60 minutes of therapeutic exercise, Socorro, was supervised by a rehabilitation technician under the direction of the treating therapist.    THERAPEUTIC EXERCISES to develop strength, ROM, and flexibility for 60 minutes including :      Stationary bike 10 min level 1 seat 5    Bridging Green Theraband 3 x 10  Hip abduction Green Theraband x 30  Sidelying hip External Rotation  3 x 10  Prone hip extension 2 x 10  Straight leg raise 3 x 10    Supine DKTC with Physioball (peanut) 3 x 10  Physioball (peanut)  crunches 3 x 10  Physioball (peanut) oblique crunch 3 x 10    Standing hip abduction 3 x 10  Standing hip extension 3 x 10  Standing hip flexion 3 x 10  Standing mini squats x 3 x 10    Seated marching 2# 3 x 10 - no weight today  Seated long arc quads 2# 3 x 10  Hamstring curls Green Theraband x 30     Shuttle: 37# Bilateral x 30  Shuttle: 37# heel raises x 30    Stool races 2 laps  NOT PERFORMED         Patient Education and Home Exercises     Home Exercises Provided and Patient Education Provided     Education provided:   - Importance or regular physical activity    Written Home Exercises Provided: Patient instructed to cont prior HEP. Exercises were reviewed and Socorro was able to demonstrate them prior to the end of the session.  Socorro demonstrated good  understanding of the education provided. See EMR under Patient Instructions for exercises provided during therapy sessions    ASSESSMENT     Patient presents ambulating toe touch weight bearing on the left lower extremity. She displays good effort with therapeutic exercises.    Socorro Is progressing well towards her goals.   Pt prognosis is Good.     Pt will continue to benefit from skilled outpatient physical therapy to address the deficits listed in the problem list box on initial evaluation, provide pt/family education and to maximize pt's level of independence in the home and community environment.     Pt's spiritual, cultural and educational needs considered and pt agreeable to plan of care and goals.     Anticipated barriers to physical therapy: none    Goals:   Short Term Goals (STG) # weeks Goal Review Date Reviewed Date Met   1. The patient will begin a written HEP 1 progressing 2/13/2023       2. Decrease soft tissue tenderness to mild 4 progressing 2/13/2023       3. Increase hip flexion to 110* 4 progressing 2/13/2023       4. The patient will begin ambulating without assistive device  4 progressing 2/13/2023          Long Term Goals (LTG) # weeks  Goal Review Date Reviewed Date Met   1. The patient will be independent with a HEP for maintenance 8 progressing 2/13/2023       2. Increase hip ROM to WFL 8 progressing 2/13/2023       3. Increase left hip strength to 5/5 8 progressing 2/13/2023       4. Decrease FOTO score to 39 % 8 progressing 2/13/2023       5. The patient will be able to ascend/descend 20 step/stairs without onset of symptoms.    8 progressing 2/13/2023        PLAN     Continue with physical therapy as per plan of care      Ted Stanley, PT

## 2023-02-13 NOTE — PLAN OF CARE
Outpatient Therapy Updated Plan of Care     Visit Date: 2/13/2023    Name: Socorro Willis  [facility] Number: [id number]    Therapy Diagnosis:   Encounter Diagnoses   Name Primary?    Closed subcapital fracture of neck of left femur, initial encounter Yes    Gait abnormality     Weakness of left lower extremity     Impaired flexibility of lower extremity     Decreased range of motion of left lower extremity      Physician: Elva Aguilar NP    Physician Orders: PT Eval and Treat Weight bearing status toe touch weight bearing per Dr. Sauceda  Medical Diagnosis from Referral: Closed fracture of neck of left femur, initial encounter  Evaluation Date: 11/22/2022  Authorization Period Expiration: 12/31/2022  Plan of Care Expiration: 2/3/2023  Visit # / Visits authorized: 12/ 12   (POC 16/16)   FOTO Due visit 5  FOTO Due visit 10    Precautions:  Precautions: Standard and Weightbearing (Updated 12/19 - toe touch weight bearing)   Insurance: Payor: MEDICAID / Plan: [employer] CONNECT / Product Type: Managed Medicaid /   Functional Level Prior to Evaluation:  Decreased ability to perform ADL and limited tolerance to standing and walking.    Subjective     Update: The patient reports continued hip pain. States she does not always adhere to weight bearing status    Objective     Update:       Hip Previous  Current    AROM Left Right Left Right   Flexion 92 degrees 120 degrees 110 degrees 120 degrees   Abduction 28 degrees 42 degrees 36 degrees 42 degrees   Int. Rotation 20 degrees 35 degrees 28 degrees 35 degrees   Ext. Rotation 18 degrees 30 degrees 26 degrees 30 degrees     Hip Previous  Current    MMT Left Right Left Right   Flexion 3+/5 5/5 4/5 5/5   Abduction 3+/5 8/5 4/5 5/5   Int. Rotation 3+/5 5/5 4/5 5/5   Ext. Rotation 3+/5 5/5 4/5 5/5       Assessment     Update: The patient is progressing slow due to limitation in weight  bearing status.       Previous Short Term Goals Status:       Short Term Goals (STG) # weeks  Goal Review Date Reviewed Date Met   1. The patient will begin a written HEP 1 progressing 2/13/2023        2. Decrease soft tissue tenderness to mild 4 progressing 2/13/2023        3. Increase hip flexion to 110* 4 progressing 2/13/2023        4. The patient will begin ambulating without assistive device  4 progressing 2/13/2023          Long Term Goal Status:   continue per initial plan of care.    weeks Goal Review Date Reviewed Date Met    1. The patient will be independent with a HEP for maintenance 8 progressing 2/13/2023        2. Increase hip ROM to WFL 8 progressing 2/13/2023        3. Increase left hip strength to 5/5 8 progressing 2/13/2023        4. Decrease FOTO score to 39 % 8 progressing 2/13/2023        5. The patient will be able to ascend/descend 20 step/stairs without onset of symptoms.    8 progressing 2/13/2023          Reasons for Recertification of Therapy:   Progress left lower extremity strength and ambulatory status    Plan     Updated Certification Period: 2/13/2023 to 4/14/2023  Recommended Treatment Plan: 2 times per week for 8 weeks: Gait Training, Manual Therapy, Neuromuscular Re-ed, Patient Education, Therapeutic Activities, and Therapeutic Exercise  Other Recommendations: Progress as tolerated    Ted Stanley, PT  2/13/2023      I CERTIFY THE NEED FOR THESE SERVICES FURNISHED UNDER THIS PLAN OF TREATMENT AND WHILE UNDER MY CARE    Physician's comments:        Physician's Signature: ___________________________________________________

## 2023-02-20 DIAGNOSIS — S72.002D CLOSED FRACTURE OF NECK OF LEFT FEMUR WITH ROUTINE HEALING, SUBSEQUENT ENCOUNTER: Primary | ICD-10-CM

## 2023-02-27 ENCOUNTER — OFFICE VISIT (OUTPATIENT)
Dept: ORTHOPEDICS | Facility: CLINIC | Age: 48
End: 2023-02-27
Payer: MEDICAID

## 2023-02-27 ENCOUNTER — HOSPITAL ENCOUNTER (OUTPATIENT)
Dept: RADIOLOGY | Facility: HOSPITAL | Age: 48
Discharge: HOME | End: 2023-02-27
Attending: ORTHOPAEDIC SURGERY
Payer: MEDICAID

## 2023-02-27 VITALS — HEIGHT: 63 IN | BODY MASS INDEX: 31.01 KG/M2 | WEIGHT: 175 LBS | RESPIRATION RATE: 18 BRPM

## 2023-02-27 DIAGNOSIS — S72.002D CLOSED FRACTURE OF NECK OF LEFT FEMUR WITH ROUTINE HEALING, SUBSEQUENT ENCOUNTER: ICD-10-CM

## 2023-02-27 DIAGNOSIS — S72.002D CLOSED FRACTURE OF NECK OF LEFT FEMUR WITH ROUTINE HEALING, SUBSEQUENT ENCOUNTER: Primary | ICD-10-CM

## 2023-02-27 PROCEDURE — 99999 PR PBB SHADOW E&M-EST. PATIENT-LVL II: ICD-10-PCS | Mod: PBBFAC,,, | Performed by: ORTHOPAEDIC SURGERY

## 2023-02-27 PROCEDURE — 99213 PR OFFICE/OUTPT VISIT, EST, LEVL III, 20-29 MIN: ICD-10-PCS | Mod: S$PBB,,, | Performed by: ORTHOPAEDIC SURGERY

## 2023-02-27 PROCEDURE — 99213 OFFICE O/P EST LOW 20 MIN: CPT | Mod: S$PBB,,, | Performed by: ORTHOPAEDIC SURGERY

## 2023-02-27 PROCEDURE — 1159F MED LIST DOCD IN RCRD: CPT | Mod: CPTII,,, | Performed by: ORTHOPAEDIC SURGERY

## 2023-02-27 PROCEDURE — 3008F BODY MASS INDEX DOCD: CPT | Mod: CPTII,,, | Performed by: ORTHOPAEDIC SURGERY

## 2023-02-27 PROCEDURE — 1160F PR REVIEW ALL MEDS BY PRESCRIBER/CLIN PHARMACIST DOCUMENTED: ICD-10-PCS | Mod: CPTII,,, | Performed by: ORTHOPAEDIC SURGERY

## 2023-02-27 PROCEDURE — 73502 X-RAY EXAM HIP UNI 2-3 VIEWS: CPT | Mod: 26,LT,, | Performed by: RADIOLOGY

## 2023-02-27 PROCEDURE — 73502 X-RAY EXAM HIP UNI 2-3 VIEWS: CPT | Mod: TC,PN,LT

## 2023-02-27 PROCEDURE — 99212 OFFICE O/P EST SF 10 MIN: CPT | Mod: PBBFAC,PN | Performed by: ORTHOPAEDIC SURGERY

## 2023-02-27 PROCEDURE — 99999 PR PBB SHADOW E&M-EST. PATIENT-LVL II: CPT | Mod: PBBFAC,,, | Performed by: ORTHOPAEDIC SURGERY

## 2023-02-27 PROCEDURE — 73502 XR HIP WITH PELVIS WHEN PERFORMED, 2 OR 3 VIEWS LEFT: ICD-10-PCS | Mod: 26,LT,, | Performed by: RADIOLOGY

## 2023-02-27 PROCEDURE — 1160F RVW MEDS BY RX/DR IN RCRD: CPT | Mod: CPTII,,, | Performed by: ORTHOPAEDIC SURGERY

## 2023-02-27 PROCEDURE — 1159F PR MEDICATION LIST DOCUMENTED IN MEDICAL RECORD: ICD-10-PCS | Mod: CPTII,,, | Performed by: ORTHOPAEDIC SURGERY

## 2023-02-27 PROCEDURE — 3008F PR BODY MASS INDEX (BMI) DOCUMENTED: ICD-10-PCS | Mod: CPTII,,, | Performed by: ORTHOPAEDIC SURGERY

## 2023-02-27 NOTE — PROGRESS NOTES
Past Medical History:   Diagnosis Date    Back injury     Sciatic nerve pain        Past Surgical History:   Procedure Laterality Date    OPEN REDUCTION AND INTERNAL FIXATION (ORIF) OF INTERTROCHANTERIC FRACTURE OF FEMUR Left 11/6/2022    Procedure: ORIF, FRACTURE, FEMUR, INTERTROCHANTERIC;  Surgeon: Mango Sauceda MD;  Location: [facility];  Service: Orthopedics;  Laterality: Left;    TUBAL LIGATION         Current Outpatient Medications   Medication Sig    aspirin (ECOTRIN) 81 MG EC tablet Take 1 tablet (81 mg total) by mouth 2 (two) times a day.    HYDROcodone-acetaminophen (NORCO) 5-325 mg per tablet Take 1 tablet by mouth every 4 (four) hours as needed for Pain.     No current facility-administered medications for this visit.       Review of patient's allergies indicates:  No Known Allergies    Family History   Problem Relation Age of Onset    Heart disease Father        Social History     Socioeconomic History    Marital status:    Tobacco Use    Smoking status: Every Day     Packs/day: 1.00     Types: Cigarettes    Smokeless tobacco: Never   Substance and Sexual Activity    Alcohol use: Yes     Alcohol/week: 14.0 standard drinks     Types: 14 Cans of beer per week    Drug use: Not Currently     Social Determinants of Health     Financial Resource Strain: Unknown    Difficulty of Paying Living Expenses: Patient refused   Food Insecurity: Unknown    Worried About Running Out of Food in the Last Year: Patient refused    Ran Out of Food in the Last Year: Patient refused   Transportation Needs: Unknown    Lack of Transportation (Medical): Patient refused    Lack of Transportation (Non-Medical): Patient refused   Physical Activity: Unknown    Days of Exercise per Week: Patient refused    Minutes of Exercise per Session: Patient refused   Stress: Unknown    Feeling of Stress : Patient refused   Social Connections: Unknown    Frequency of Communication with Friends and Family: Patient refused     Frequency of Social Gatherings with Friends and Family: Patient refused    Attends Taoism Services: Patient refused    Active Member of Clubs or Organizations: Patient refused    Attends Club or Organization Meetings: Patient refused    Marital Status: Patient refused   Housing Stability: Unknown    Unable to Pay for Housing in the Last Year: Patient refused    Unstable Housing in the Last Year: Patient refused       Chief Complaint:   No chief complaint on file.      Date of surgery:  November 6, 2022    History of present illness:  47-year-old female seen after IM nailing of a left intertrochanteric femur fracture.  Patient had a fall and fell onto the left hip again about 3-4 days ago.  Increased groin pain now.  Patient states that she was actually doing very well before the new incident.  Pain is up to 6/10.      Review of Systems:    Musculoskeletal:  See HPI        Physical Examination:    Vital Signs:    There were no vitals filed for this visit.      There is no height or weight on file to calculate BMI.    This a well-developed, well nourished patient in no acute distress.  They are alert and oriented and cooperative to examination.  Pt. walks with a mild antalgic gait using crutches.    Examination left hip shows healed surgical incisions.  No erythema drainage.  Patient is neurovascularly intact.  Decent range of motion of the hip.  Increased groin pain with hip range of motion    X-rays:  X-rays left femur ordered and reviewed which show  intertrochanteric femur fracture with progressive varus angulation of the fracture with the lag screw now looking like it might have some head penetration.     Assessment::  Status post IM nailing of left intertrochanteric femur fracture  Left varus collapse    Plan:  Reviewed the findings with her today.  Continue weight-bearing restrictions using crutches.  We will get her in with Dr. Pacheco, a trauma specialist, to discuss possible revision internal fixation  versus conversion to total hip arthroplasty.    This note was created using [employer] voice recognition software that occasionally misinterpreted phrases or words.

## 2023-02-28 ENCOUNTER — TELEPHONE (OUTPATIENT)
Dept: ORTHOPEDICS | Facility: CLINIC | Age: 48
End: 2023-02-28
Payer: MEDICAID

## 2023-02-28 NOTE — TELEPHONE ENCOUNTER
Good afternoon,   Austyn Farias is referring this pt to be seen buy  for femur fracture. Please contact pt to schedule,.

## 2023-03-16 ENCOUNTER — OFFICE VISIT (OUTPATIENT)
Dept: ORTHOPEDICS | Facility: CLINIC | Age: 48
End: 2023-03-16
Payer: MEDICAID

## 2023-03-16 DIAGNOSIS — S72.92XK CLOSED FRACTURE OF LEFT FEMUR WITH NONUNION, UNSPECIFIED FRACTURE MORPHOLOGY, UNSPECIFIED PORTION OF FEMUR, SUBSEQUENT ENCOUNTER: ICD-10-CM

## 2023-03-16 DIAGNOSIS — Z01.818 PRE-OP TESTING: Primary | ICD-10-CM

## 2023-03-16 PROCEDURE — 99214 PR OFFICE/OUTPT VISIT, EST, LEVL IV, 30-39 MIN: ICD-10-PCS | Mod: S$PBB,,, | Performed by: ORTHOPAEDIC SURGERY

## 2023-03-16 PROCEDURE — 99213 OFFICE O/P EST LOW 20 MIN: CPT | Mod: PBBFAC,PN | Performed by: ORTHOPAEDIC SURGERY

## 2023-03-16 PROCEDURE — 1160F RVW MEDS BY RX/DR IN RCRD: CPT | Mod: CPTII,,, | Performed by: ORTHOPAEDIC SURGERY

## 2023-03-16 PROCEDURE — 1159F MED LIST DOCD IN RCRD: CPT | Mod: CPTII,,, | Performed by: ORTHOPAEDIC SURGERY

## 2023-03-16 PROCEDURE — 99214 OFFICE O/P EST MOD 30 MIN: CPT | Mod: S$PBB,,, | Performed by: ORTHOPAEDIC SURGERY

## 2023-03-16 PROCEDURE — 1159F PR MEDICATION LIST DOCUMENTED IN MEDICAL RECORD: ICD-10-PCS | Mod: CPTII,,, | Performed by: ORTHOPAEDIC SURGERY

## 2023-03-16 PROCEDURE — 1160F PR REVIEW ALL MEDS BY PRESCRIBER/CLIN PHARMACIST DOCUMENTED: ICD-10-PCS | Mod: CPTII,,, | Performed by: ORTHOPAEDIC SURGERY

## 2023-03-16 PROCEDURE — 99999 PR PBB SHADOW E&M-EST. PATIENT-LVL III: CPT | Mod: PBBFAC,,, | Performed by: ORTHOPAEDIC SURGERY

## 2023-03-16 PROCEDURE — 99999 PR PBB SHADOW E&M-EST. PATIENT-LVL III: ICD-10-PCS | Mod: PBBFAC,,, | Performed by: ORTHOPAEDIC SURGERY

## 2023-03-16 RX ORDER — SODIUM CHLORIDE 0.9 % (FLUSH) 0.9 %
10 SYRINGE (ML) INJECTION EVERY 6 HOURS PRN
Status: SHIPPED | OUTPATIENT
Start: 2023-03-16

## 2023-03-17 ENCOUNTER — TELEPHONE (OUTPATIENT)
Dept: ORTHOPEDICS | Facility: CLINIC | Age: 48
End: 2023-03-17
Payer: MEDICAID

## 2023-03-17 RX ORDER — METHOCARBAMOL 750 MG/1
750 TABLET, FILM COATED ORAL 4 TIMES DAILY
Qty: 40 TABLET | Refills: 0 | Status: SHIPPED | OUTPATIENT
Start: 2023-03-17 | End: 2023-03-18 | Stop reason: SDUPTHER

## 2023-03-17 NOTE — TELEPHONE ENCOUNTER
----- Message from Deb Parisi MA sent at 3/17/2023  2:37 PM CDT -----  Contact: pt  Pt states Dr. Pacheco was suppose to send in some muscle relaxer yesterday to her pharmacy , but pharmacy has no rx for her. Pt states she would like for the rx to be called in .  Please advise  ----- Message -----  From: Vaishali Lopez MA  Sent: 3/17/2023   2:13 PM CDT  To: Junior Boyer Staff    Calling to state her RX was not called in   Pt hung up phone before I could be pharmacy information   Called back     Pharmacy [facility]   Call back

## 2023-03-17 NOTE — TELEPHONE ENCOUNTER
----- Message from Vaishali Lopez MA sent at 3/17/2023  2:11 PM CDT -----  Contact: pt  Calling to state her RX was not called in   Pt hung up phone before I could be pharmacy information   Called back     Pharmacy [facility]  [place]   Call back

## 2023-03-18 RX ORDER — METHOCARBAMOL 750 MG/1
750 TABLET, FILM COATED ORAL 4 TIMES DAILY
Qty: 40 TABLET | Refills: 0 | Status: SHIPPED | OUTPATIENT
Start: 2023-03-18 | End: 2023-03-28

## 2023-03-24 NOTE — H&P
Chief Complaint   Patient presents with    Left Femur - Injury, Pain     Tib/Fib injury - 02/27/2023       HPI:    This is a 47 y.o. who presents today complaining of left hip pain for 4 months after undergoing IMN at OSH. She notes she is still smoking significantly. Pain is throbbing. No numbness or tingling. No associated signs or symptoms.      Past Medical History:   Diagnosis Date    Back injury     Sciatic nerve pain       Past Surgical History:   Procedure Laterality Date    OPEN REDUCTION AND INTERNAL FIXATION (ORIF) OF INTERTROCHANTERIC FRACTURE OF FEMUR Left 11/6/2022    Procedure: ORIF, FRACTURE, FEMUR, INTERTROCHANTERIC;  Surgeon: Mango Sauceda MD;  Location: [facility];  Service: Orthopedics;  Laterality: Left;    TUBAL LIGATION        Current Outpatient Medications on File Prior to Visit   Medication Sig Dispense Refill    aspirin (ECOTRIN) 81 MG EC tablet Take 1 tablet (81 mg total) by mouth 2 (two) times a day. 60 tablet 0    HYDROcodone-acetaminophen (NORCO) 5-325 mg per tablet Take 1 tablet by mouth every 4 (four) hours as needed for Pain. (Patient not taking: Reported on 3/16/2023) 28 tablet 0     No current facility-administered medications on file prior to visit.      Review of patient's allergies indicates:  No Known Allergies   Family History not pertinent   Social History     Socioeconomic History    Marital status:    Tobacco Use    Smoking status: Every Day     Packs/day: 1.00     Types: Cigarettes    Smokeless tobacco: Never   Substance and Sexual Activity    Alcohol use: Yes     Alcohol/week: 14.0 standard drinks     Types: 14 Cans of beer per week    Drug use: Not Currently     Social Determinants of Health     Financial Resource Strain: Unknown    Difficulty of Paying Living Expenses: Patient refused   Food Insecurity: Unknown    Worried About Running Out of Food in the Last Year: Patient refused    Ran Out of Food in the Last Year: Patient refused   Transportation Needs:  Unknown    Lack of Transportation (Medical): Patient refused    Lack of Transportation (Non-Medical): Patient refused   Physical Activity: Unknown    Days of Exercise per Week: Patient refused    Minutes of Exercise per Session: Patient refused   Stress: Unknown    Feeling of Stress : Patient refused   Social Connections: Unknown    Frequency of Communication with Friends and Family: Patient refused    Frequency of Social Gatherings with Friends and Family: Patient refused    Attends Quaker Services: Patient refused    Active Member of Clubs or Organizations: Patient refused    Attends Club or Organization Meetings: Patient refused    Marital Status: Patient refused   Housing Stability: Unknown    Unable to Pay for Housing in the Last Year: Patient refused    Unstable Housing in the Last Year: Patient refused         Review of Systems:   Constitutional:  Denies fever or chills    Eyes:  Denies change in visual acuity    HENT:  Denies nasal congestion or sore throat    Respiratory:  Denies cough or shortness of breath    Cardiovascular:  Denies chest pain or edema    GI:  Denies abdominal pain, nausea, vomiting, bloody stools or diarrhea    :  Denies dysuria    Integument:  Denies rash    Neurologic:  Denies headache, focal weakness or sensory changes    Endocrine:  Denies polyuria or polydipsia    Lymphatic:  Denies swollen glands    Psychiatric:  Denies depression or anxiety       Physical Exam:    Constitutional:  Well developed, well nourished, no acute distress, non-toxic appearance    Integument:  Well hydrated, no rash    Lymphatic:  No lymphadenopathy noted    Neurologic:  Alert & oriented x 3,     Psychiatric:  Speech and behavior appropriate    Gi: abdomen soft  Eyes: EOMI   R hip  Exam performed same as contralateral side and is normal  L hip  Mild LLD clinically. Pain with ROM. Diffuse point TTP noted. Skin intact. Compartments soft. NVI distally.      X-rays were performed today, personally reviewed  by me and findings discussed with the patient.   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, unspecified fracture morphology, unspecified portion of femur, subsequent encounter  -     Vital signs; Standing  -     Diet NPO; Standing  -     Place ANGELY hose; Standing  -     Place sequential compression device; Standing  -     Case Request Operating Room: ORIF, FRACTURE, FEMUR, REMOVAL, HARDWARE  -     Admit to Inpatient; Standing    Other orders  -     sodium chloride 0.9% flush 10 mL  -     IP VTE LOW RISK PATIENT; Standing  -     tranexamic acid (CYKLOKAPRON) 1,000 mg in sodium chloride 0.9% 100 mL  -     ceFAZolin (ANCEF) 2 g in dextrose 5 % (D5W) 50 mL IVPB          I had a long discussion with the patient regarding the benefits and risks of revision ORIF. They voiced understanding and wish to proceed with surgery. Consents signed in clinic. She understands she has to quit smoking before surgery.

## 2023-03-29 ENCOUNTER — LAB VISIT (OUTPATIENT)
Dept: LAB | Facility: HOSPITAL | Age: 48
End: 2023-03-29
Attending: ORTHOPAEDIC SURGERY
Payer: MEDICAID

## 2023-03-29 DIAGNOSIS — Z01.818 PRE-OP TESTING: ICD-10-CM

## 2023-03-29 PROCEDURE — 36415 COLL VENOUS BLD VENIPUNCTURE: CPT | Mod: PO | Performed by: ORTHOPAEDIC SURGERY

## 2023-03-29 PROCEDURE — 80323 ALKALOIDS NOS: CPT | Performed by: ORTHOPAEDIC SURGERY

## 2023-04-02 LAB
COTININE SERPL-MCNC: 220 NG/ML
NICOTINE SERPL-MCNC: ABNORMAL NG/ML

## 2023-04-03 ENCOUNTER — TELEPHONE (OUTPATIENT)
Dept: ORTHOPEDICS | Facility: CLINIC | Age: 48
End: 2023-04-03
Payer: MEDICAID

## 2023-04-03 NOTE — TELEPHONE ENCOUNTER
Spoke with pt and advise that robaxin was called in , pt states already has that script and it's not working.  Pt wants a call back from staff to discuss what else can be sent in.

## 2023-04-03 NOTE — TELEPHONE ENCOUNTER
----- Message from Andrei Gusman sent at 4/3/2023 12:17 PM CDT -----  Regarding: checking on Rx, call pt   Contact: pt   checking on Rx, call pt

## 2023-04-05 ENCOUNTER — TELEPHONE (OUTPATIENT)
Dept: ORTHOPEDICS | Facility: CLINIC | Age: 48
End: 2023-04-05
Payer: MEDICAID

## 2023-04-05 NOTE — TELEPHONE ENCOUNTER
Spoke with pt and advise that  and staff are out of clinic until 4/6/23, and I will forward over her message to them and they will further assist her once returned.      Pt states her leg won't stop swelling and the pain meds she  is on is not helping at all. Told pt to go to the ER if the swelling pain persist.

## 2023-04-05 NOTE — TELEPHONE ENCOUNTER
----- Message from Andrei Gusman sent at 4/5/2023  8:41 AM CDT -----  Regarding: wants to speak to a nurse ASAP about her issues, call pt   Contact: pt   wants to speak to a nurse ASAP about her issues, call pt

## 2023-04-10 NOTE — TELEPHONE ENCOUNTER
Spoke with pt and advise that dr. Pacheco and staff return to clinic tomorrow. Pt states she been waiting on a different pain medication to be called in to her pharmacy however, pt states no one ever called her back, pt wants a call back asap

## 2023-04-10 NOTE — TELEPHONE ENCOUNTER
----- Message from Vaishali Lopez MA sent at 4/10/2023  2:18 PM CDT -----  Contact: pt  Refill   Pharmacy  [facility]   Call back

## 2023-04-11 DIAGNOSIS — S72.92XK CLOSED FRACTURE OF LEFT FEMUR WITH NONUNION, UNSPECIFIED FRACTURE MORPHOLOGY, UNSPECIFIED PORTION OF FEMUR, SUBSEQUENT ENCOUNTER: Primary | ICD-10-CM

## 2023-04-11 RX ORDER — TRAMADOL HYDROCHLORIDE 50 MG/1
50 TABLET ORAL EVERY 6 HOURS PRN
Qty: 40 TABLET | Refills: 0 | Status: SHIPPED | OUTPATIENT
Start: 2023-04-11 | End: 2023-05-15

## 2023-04-11 RX ORDER — TRAMADOL HYDROCHLORIDE 50 MG/1
50 TABLET ORAL EVERY 6 HOURS PRN
Qty: 28 TABLET | Refills: 0 | OUTPATIENT
Start: 2023-04-11

## 2023-04-11 NOTE — TELEPHONE ENCOUNTER
----- Message from Kar Valdovinos sent at 4/11/2023  4:08 PM CDT -----  Contact: self  Type: Needs Medical Advice  Who Called:  Patient    Pharmacy name and phone #:    [facility] - [place], [place] - [address]  [address]  [place] [address]  Phone: [phone] Fax: [phone]      Best Call Back Number: [phone]    Additional Information: Pt states she need to speak with nurse regarding medication she said was suppose to be called into pharm.Please call back

## 2023-04-18 ENCOUNTER — HOSPITAL ENCOUNTER (OUTPATIENT)
Dept: RADIOLOGY | Facility: HOSPITAL | Age: 48
Discharge: HOME | End: 2023-04-18
Attending: ORTHOPAEDIC SURGERY
Payer: MEDICAID

## 2023-04-18 ENCOUNTER — OFFICE VISIT (OUTPATIENT)
Dept: ORTHOPEDICS | Facility: CLINIC | Age: 48
End: 2023-04-18
Payer: MEDICAID

## 2023-04-18 VITALS — BODY MASS INDEX: 31.89 KG/M2 | WEIGHT: 180 LBS | HEIGHT: 63 IN

## 2023-04-18 DIAGNOSIS — S72.92XK CLOSED FRACTURE OF LEFT FEMUR WITH NONUNION, UNSPECIFIED FRACTURE MORPHOLOGY, UNSPECIFIED PORTION OF FEMUR, SUBSEQUENT ENCOUNTER: ICD-10-CM

## 2023-04-18 DIAGNOSIS — S72.92XK CLOSED FRACTURE OF LEFT FEMUR WITH NONUNION, UNSPECIFIED FRACTURE MORPHOLOGY, UNSPECIFIED PORTION OF FEMUR, SUBSEQUENT ENCOUNTER: Primary | ICD-10-CM

## 2023-04-18 PROCEDURE — 99213 OFFICE O/P EST LOW 20 MIN: CPT | Mod: S$PBB,,, | Performed by: ORTHOPAEDIC SURGERY

## 2023-04-18 PROCEDURE — 99999 PR PBB SHADOW E&M-EST. PATIENT-LVL II: CPT | Mod: PBBFAC,,, | Performed by: ORTHOPAEDIC SURGERY

## 2023-04-18 PROCEDURE — 99212 OFFICE O/P EST SF 10 MIN: CPT | Mod: PBBFAC,PN | Performed by: ORTHOPAEDIC SURGERY

## 2023-04-18 PROCEDURE — 3008F PR BODY MASS INDEX (BMI) DOCUMENTED: ICD-10-PCS | Mod: CPTII,,, | Performed by: ORTHOPAEDIC SURGERY

## 2023-04-18 PROCEDURE — 99213 PR OFFICE/OUTPT VISIT, EST, LEVL III, 20-29 MIN: ICD-10-PCS | Mod: S$PBB,,, | Performed by: ORTHOPAEDIC SURGERY

## 2023-04-18 PROCEDURE — 99999 PR PBB SHADOW E&M-EST. PATIENT-LVL II: ICD-10-PCS | Mod: PBBFAC,,, | Performed by: ORTHOPAEDIC SURGERY

## 2023-04-18 PROCEDURE — 73552 X-RAY EXAM OF FEMUR 2/>: CPT | Mod: 26,LT,, | Performed by: RADIOLOGY

## 2023-04-18 PROCEDURE — 1159F PR MEDICATION LIST DOCUMENTED IN MEDICAL RECORD: ICD-10-PCS | Mod: CPTII,,, | Performed by: ORTHOPAEDIC SURGERY

## 2023-04-18 PROCEDURE — 73552 X-RAY EXAM OF FEMUR 2/>: CPT | Mod: TC,PO,LT

## 2023-04-18 PROCEDURE — 1160F PR REVIEW ALL MEDS BY PRESCRIBER/CLIN PHARMACIST DOCUMENTED: ICD-10-PCS | Mod: CPTII,,, | Performed by: ORTHOPAEDIC SURGERY

## 2023-04-18 PROCEDURE — 73552 XR FEMUR 2 VIEW LEFT: ICD-10-PCS | Mod: 26,LT,, | Performed by: RADIOLOGY

## 2023-04-18 PROCEDURE — 3008F BODY MASS INDEX DOCD: CPT | Mod: CPTII,,, | Performed by: ORTHOPAEDIC SURGERY

## 2023-04-18 PROCEDURE — 1159F MED LIST DOCD IN RCRD: CPT | Mod: CPTII,,, | Performed by: ORTHOPAEDIC SURGERY

## 2023-04-18 PROCEDURE — 1160F RVW MEDS BY RX/DR IN RCRD: CPT | Mod: CPTII,,, | Performed by: ORTHOPAEDIC SURGERY

## 2023-04-27 NOTE — PROGRESS NOTES
Chief Complaint   Patient presents with    Left Femur - Pain       HPI:   This is a 47 y.o. who returns today status post left hip IMN at OSH 6 months ago. She was scheduled for revision ORIF but did not stop smoking and nicotine was positive.  Patient has been PWB.  Pain is throbbing. No numbness or tingling. No associated signs or symptoms.    Past Medical History:   Diagnosis Date    Back injury     Femur fracture     Sciatic nerve pain     Smoker      Past Surgical History:   Procedure Laterality Date    OPEN REDUCTION AND INTERNAL FIXATION (ORIF) OF INTERTROCHANTERIC FRACTURE OF FEMUR Left 11/6/2022    Procedure: ORIF, FRACTURE, FEMUR, INTERTROCHANTERIC;  Surgeon: Mango Sauceda MD;  Location: [facility];  Service: Orthopedics;  Laterality: Left;    TUBAL LIGATION       Current Outpatient Medications on File Prior to Visit   Medication Sig Dispense Refill    methocarbamoL (ROBAXIN) 750 MG Tab Take 500 mg by mouth 4 (four) times daily as needed.      traMADoL (ULTRAM) 50 mg tablet Take 1 tablet (50 mg total) by mouth every 6 (six) hours as needed for Pain. 40 tablet 0     Current Facility-Administered Medications on File Prior to Visit   Medication Dose Route Frequency Provider Last Rate Last Admin    sodium chloride 0.9% flush 10 mL  10 mL Intravenous Q6H PRN Merlin Pacheco MD         Review of patient's allergies indicates:  No Known Allergies  Family History   Problem Relation Age of Onset    Heart disease Mother     Heart disease Father      Social History     Socioeconomic History    Marital status:    Tobacco Use    Smoking status: Every Day     Packs/day: 0.25     Types: Cigarettes    Smokeless tobacco: Never   Substance and Sexual Activity    Alcohol use: Yes     Alcohol/week: 14.0 standard drinks     Types: 14 Cans of beer per week    Drug use: Not Currently     Types: Marijuana     Comment: prn pain     Social Determinants of Health     Financial Resource Strain: Unknown    Difficulty  of Paying Living Expenses: Patient refused   Food Insecurity: Unknown    Worried About Running Out of Food in the Last Year: Patient refused    Ran Out of Food in the Last Year: Patient refused   Transportation Needs: Unknown    Lack of Transportation (Medical): Patient refused    Lack of Transportation (Non-Medical): Patient refused   Physical Activity: Unknown    Days of Exercise per Week: Patient refused    Minutes of Exercise per Session: Patient refused   Stress: Unknown    Feeling of Stress : Patient refused   Social Connections: Unknown    Frequency of Communication with Friends and Family: Patient refused    Frequency of Social Gatherings with Friends and Family: Patient refused    Attends Taoism Services: Patient refused    Active Member of Clubs or Organizations: Patient refused    Attends Club or Organization Meetings: Patient refused    Marital Status: Patient refused   Housing Stability: Unknown    Unable to Pay for Housing in the Last Year: Patient refused    Unstable Housing in the Last Year: Patient refused       Review of Systems:  Constitutional:  Denies fever or chills   Eyes:  Denies change in visual acuity   HENT:  Denies nasal congestion or sore throat   Respiratory:  Denies cough or shortness of breath   Cardiovascular:  Denies chest pain or edema   GI:  Denies abdominal pain, nausea, vomiting, bloody stools or diarrhea   :  Denies dysuria   Integument:  Denies rash   Neurologic:  Denies headache, focal weakness or sensory changes   Endocrine:  Denies polyuria or polydipsia   Lymphatic:  Denies swollen glands   Psychiatric:  Denies depression or anxiety     Physical Exam:   Constitutional:  Well developed, well nourished, no acute distress, non-toxic appearance   Integument:  Well hydrated, no rash   Lymphatic:  No lymphadenopathy noted   Neurologic:  Alert & oriented x 3, CN 2-12 normal, normal motor function, normal sensory function, no focal deficits noted   Psychiatric:  Speech and  behavior appropriate   Gi: abdomen soft  Eyes: EOMI    R hip  Exam performed same as contralateral side and is normal  L hip  No ROM due to pain. Overall normal alignment. Skin intact. Incisions healed. NVI distally.   X-rays were performed today, personally reviewed by me and findings discussed with the patient.  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 RTC 2 weeks

## 2023-05-05 ENCOUNTER — TELEPHONE (OUTPATIENT)
Dept: ORTHOPEDICS | Facility: CLINIC | Age: 48
End: 2023-05-05
Payer: MEDICAID

## 2023-05-05 DIAGNOSIS — Z01.818 PRE-OP TESTING: Primary | ICD-10-CM

## 2023-05-05 NOTE — TELEPHONE ENCOUNTER
----- Message from Deb Parisi MA sent at 5/5/2023  3:36 PM CDT -----  Contact: pt  Please advise  ----- Message -----  From: Vaishali Lopez MA  Sent: 5/5/2023   2:15 PM CDT  To: Junior Boyer Staff    Calling to schedule nicotine test   Call back

## 2023-05-11 ENCOUNTER — TELEPHONE (OUTPATIENT)
Dept: ORTHOPEDICS | Facility: CLINIC | Age: 48
End: 2023-05-11
Payer: MEDICAID

## 2023-05-11 NOTE — TELEPHONE ENCOUNTER
----- Message from Vaishali Lopez MA sent at 5/11/2023  1:25 PM CDT -----  Contact: pt  Needs to schedule nicotine test   Call back

## 2023-05-15 DIAGNOSIS — S72.92XK CLOSED FRACTURE OF LEFT FEMUR WITH NONUNION, UNSPECIFIED FRACTURE MORPHOLOGY, UNSPECIFIED PORTION OF FEMUR, SUBSEQUENT ENCOUNTER: Primary | ICD-10-CM

## 2023-05-15 RX ORDER — TRAMADOL HYDROCHLORIDE 50 MG/1
50 TABLET ORAL EVERY 6 HOURS PRN
Qty: 28 TABLET | Refills: 0 | Status: SHIPPED | OUTPATIENT
Start: 2023-05-15 | End: 2023-05-22

## 2023-06-05 DIAGNOSIS — S72.92XK CLOSED FRACTURE OF LEFT FEMUR WITH NONUNION, UNSPECIFIED FRACTURE MORPHOLOGY, UNSPECIFIED PORTION OF FEMUR, SUBSEQUENT ENCOUNTER: Primary | ICD-10-CM

## 2023-06-05 RX ORDER — TRAMADOL HYDROCHLORIDE 50 MG/1
50 TABLET ORAL EVERY 6 HOURS PRN
Qty: 28 TABLET | Refills: 0 | Status: SHIPPED | OUTPATIENT
Start: 2023-06-05 | End: 2023-06-12

## 2023-07-06 NOTE — Clinical Note
"Socorro Mayenalexandra Willis was seen and treated in our emergency department on 10/8/2020.  She may return to work on 10/13/2020.       If you have any questions or concerns, please don't hesitate to call.      ДМИТРИЙ Sterling MD    " Xellouz Pregnancy And Lactation Text: This medication is Pregnancy Category D and is not considered safe during pregnancy.  The risk during breast feeding is also uncertain.

## 2023-07-07 DIAGNOSIS — S72.92XK CLOSED FRACTURE OF LEFT FEMUR WITH NONUNION, UNSPECIFIED FRACTURE MORPHOLOGY, UNSPECIFIED PORTION OF FEMUR, SUBSEQUENT ENCOUNTER: Primary | ICD-10-CM

## 2023-07-07 RX ORDER — TRAMADOL HYDROCHLORIDE 50 MG/1
50 TABLET ORAL EVERY 8 HOURS PRN
Qty: 21 TABLET | Refills: 0 | Status: SHIPPED | OUTPATIENT
Start: 2023-07-07 | End: 2023-07-14

## 2023-10-11 ENCOUNTER — LAB VISIT (OUTPATIENT)
Dept: LAB | Facility: HOSPITAL | Age: 48
End: 2023-10-11
Attending: ORTHOPAEDIC SURGERY
Payer: MEDICAID

## 2023-10-11 DIAGNOSIS — Z01.818 PRE-OP TESTING: ICD-10-CM

## 2023-10-11 PROCEDURE — 80323 ALKALOIDS NOS: CPT | Performed by: ORTHOPAEDIC SURGERY

## 2023-10-11 PROCEDURE — 36415 COLL VENOUS BLD VENIPUNCTURE: CPT | Mod: PO | Performed by: ORTHOPAEDIC SURGERY

## 2023-10-14 LAB
COTININE SERPL-MCNC: 289 NG/ML
NICOTINE SERPL-MCNC: 23 NG/ML

## 2023-10-16 ENCOUNTER — TELEPHONE (OUTPATIENT)
Dept: ORTHOPEDICS | Facility: CLINIC | Age: 48
End: 2023-10-16
Payer: MEDICAID

## 2023-10-16 NOTE — TELEPHONE ENCOUNTER
----- Message from Vaishali Lopez MA sent at 10/16/2023  9:02 AM CDT -----  Contact: pt  Pt calling to see what next step is to be scheduled   Call back

## 2023-10-18 ENCOUNTER — TELEPHONE (OUTPATIENT)
Dept: ORTHOPEDICS | Facility: CLINIC | Age: 48
End: 2023-10-18
Payer: MEDICAID

## 2023-10-18 NOTE — TELEPHONE ENCOUNTER
----- Message from Laura Barrett sent at 10/18/2023 11:14 AM CDT -----  Type: Needs Medical Advice  Who Called:  pt  Best Call Back Number: [phone]  Additional Information: calling about her script  tramadol being sent into the pharmacy, she has been waiting for someone to call her back, pl call bk to advise thanks

## 2023-11-07 DIAGNOSIS — N64.52 NIPPLE DISCHARGE: Primary | ICD-10-CM

## 2023-11-09 ENCOUNTER — OFFICE VISIT (OUTPATIENT)
Dept: ORTHOPEDICS | Facility: CLINIC | Age: 48
End: 2023-11-09
Payer: MEDICAID

## 2023-11-09 VITALS — WEIGHT: 180 LBS | HEIGHT: 63 IN | BODY MASS INDEX: 31.89 KG/M2

## 2023-11-09 DIAGNOSIS — S72.92XK CLOSED FRACTURE OF LEFT FEMUR WITH NONUNION, UNSPECIFIED FRACTURE MORPHOLOGY, UNSPECIFIED PORTION OF FEMUR, SUBSEQUENT ENCOUNTER: Primary | ICD-10-CM

## 2023-11-09 PROCEDURE — 99212 OFFICE O/P EST SF 10 MIN: CPT | Mod: PBBFAC,PO | Performed by: ORTHOPAEDIC SURGERY

## 2023-11-09 PROCEDURE — 4010F ACE/ARB THERAPY RXD/TAKEN: CPT | Mod: CPTII,,, | Performed by: ORTHOPAEDIC SURGERY

## 2023-11-09 PROCEDURE — 99214 PR OFFICE/OUTPT VISIT, EST, LEVL IV, 30-39 MIN: ICD-10-PCS | Mod: S$PBB,,, | Performed by: ORTHOPAEDIC SURGERY

## 2023-11-09 PROCEDURE — 99214 OFFICE O/P EST MOD 30 MIN: CPT | Mod: S$PBB,,, | Performed by: ORTHOPAEDIC SURGERY

## 2023-11-09 PROCEDURE — 1159F MED LIST DOCD IN RCRD: CPT | Mod: CPTII,,, | Performed by: ORTHOPAEDIC SURGERY

## 2023-11-09 PROCEDURE — 99999 PR PBB SHADOW E&M-EST. PATIENT-LVL II: CPT | Mod: PBBFAC,,, | Performed by: ORTHOPAEDIC SURGERY

## 2023-11-09 PROCEDURE — 3008F PR BODY MASS INDEX (BMI) DOCUMENTED: ICD-10-PCS | Mod: CPTII,,, | Performed by: ORTHOPAEDIC SURGERY

## 2023-11-09 PROCEDURE — 1160F PR REVIEW ALL MEDS BY PRESCRIBER/CLIN PHARMACIST DOCUMENTED: ICD-10-PCS | Mod: CPTII,,, | Performed by: ORTHOPAEDIC SURGERY

## 2023-11-09 PROCEDURE — 4010F PR ACE/ARB THEARPY RXD/TAKEN: ICD-10-PCS | Mod: CPTII,,, | Performed by: ORTHOPAEDIC SURGERY

## 2023-11-09 PROCEDURE — 3008F BODY MASS INDEX DOCD: CPT | Mod: CPTII,,, | Performed by: ORTHOPAEDIC SURGERY

## 2023-11-09 PROCEDURE — 99999 PR PBB SHADOW E&M-EST. PATIENT-LVL II: ICD-10-PCS | Mod: PBBFAC,,, | Performed by: ORTHOPAEDIC SURGERY

## 2023-11-09 PROCEDURE — 1160F RVW MEDS BY RX/DR IN RCRD: CPT | Mod: CPTII,,, | Performed by: ORTHOPAEDIC SURGERY

## 2023-11-09 PROCEDURE — 1159F PR MEDICATION LIST DOCUMENTED IN MEDICAL RECORD: ICD-10-PCS | Mod: CPTII,,, | Performed by: ORTHOPAEDIC SURGERY

## 2023-11-09 NOTE — PROGRESS NOTES
Chief Complaint   Patient presents with    Left Knee - Pain     Fell one week ago        HPI:   This is a 48 y.o. who returns today status post left hip IMN 1 years ago. Patient has been unable to do some ADL's. Still smoking.  Pain is throbbing. No numbness or tingling. No associated signs or symptoms.    Past Medical History:   Diagnosis Date    Back injury     Femur fracture     Sciatic nerve pain     Smoker      Past Surgical History:   Procedure Laterality Date    OPEN REDUCTION AND INTERNAL FIXATION (ORIF) OF INTERTROCHANTERIC FRACTURE OF FEMUR Left 11/6/2022    Procedure: ORIF, FRACTURE, FEMUR, INTERTROCHANTERIC;  Surgeon: Mango Sauceda MD;  Location: [facility];  Service: Orthopedics;  Laterality: Left;    TUBAL LIGATION       Current Outpatient Medications on File Prior to Visit   Medication Sig Dispense Refill    methocarbamoL (ROBAXIN) 750 MG Tab Take 500 mg by mouth 4 (four) times daily as needed.       Current Facility-Administered Medications on File Prior to Visit   Medication Dose Route Frequency Provider Last Rate Last Admin    sodium chloride 0.9% flush 10 mL  10 mL Intravenous Q6H PRN Merlin Pacheco MD         Review of patient's allergies indicates:   Allergen Reactions    Propoxyphene n-acetaminophen      Nausea^    Tramadol hcl      Nausea^     Family History   Problem Relation Age of Onset    Heart disease Mother     Heart disease Father      Social History     Socioeconomic History    Marital status:    Tobacco Use    Smoking status: Every Day     Current packs/day: 0.25     Types: Cigarettes    Smokeless tobacco: Never   Substance and Sexual Activity    Alcohol use: Yes     Alcohol/week: 14.0 standard drinks of alcohol     Types: 14 Cans of beer per week    Drug use: Not Currently     Types: Marijuana     Comment: prn pain     Social Determinants of Health     Financial Resource Strain: Unknown (11/6/2022)    Overall Financial Resource Strain (CARDIA)     Difficulty of  Paying Living Expenses: Patient refused   Food Insecurity: Unknown (11/6/2022)    Hunger Vital Sign     Worried About Running Out of Food in the Last Year: Patient refused     Ran Out of Food in the Last Year: Patient refused   Transportation Needs: Unknown (11/6/2022)    PRAPARE - Transportation     Lack of Transportation (Medical): Patient refused     Lack of Transportation (Non-Medical): Patient refused   Physical Activity: Unknown (11/6/2022)    Exercise Vital Sign     Days of Exercise per Week: Patient refused     Minutes of Exercise per Session: Patient refused   Stress: Unknown (11/6/2022)    [employer] [place] [employer] - Occupational Stress Questionnaire     Feeling of Stress : Patient refused   Social Connections: Unknown (11/6/2022)    Social Connection and Isolation Panel [NHANES]     Frequency of Communication with Friends and Family: Patient refused     Frequency of Social Gatherings with Friends and Family: Patient refused     Attends Jainism Services: Patient refused     Active Member of Clubs or Organizations: Patient refused     Attends Club or Organization Meetings: Patient refused     Marital Status: Patient refused   Housing Stability: Unknown (11/6/2022)    Housing Stability Vital Sign     Unable to Pay for Housing in the Last Year: Patient refused     Unstable Housing in the Last Year: Patient refused       Review of Systems:  Constitutional:  Denies fever or chills   Eyes:  Denies change in visual acuity   HENT:  Denies nasal congestion or sore throat   Respiratory:  Denies cough or shortness of breath   Cardiovascular:  Denies chest pain or edema   GI:  Denies abdominal pain, nausea, vomiting, bloody stools or diarrhea   :  Denies dysuria   Integument:  Denies rash   Neurologic:  Denies headache, focal weakness or sensory changes   Endocrine:  Denies polyuria or polydipsia   Lymphatic:  Denies swollen glands   Psychiatric:  Denies depression or anxiety     Physical Exam:    Constitutional:  Well developed, well nourished, no acute distress, non-toxic appearance   Integument:  Well hydrated, no rash   Lymphatic:  No lymphadenopathy noted   Neurologic:  Alert & oriented x 3, CN 2-12 normal, normal motor function, normal sensory function, no focal deficits noted   Psychiatric:  Speech and behavior appropriate   Gi: abdomen soft  Eyes: EOMI    R hip  Exam performed same as contralateral side and is normal  L hip  No ROM due to mechanical block. Diffuse point TTP noted. Compartments soft. NVI distally.       Closed fracture of left femur with nonunion, unspecified fracture morphology, unspecified portion of femur, subsequent encounter        Awaiting smoking cessation to plan revision IMN.

## 2023-11-14 DIAGNOSIS — F17.210 NICOTINE DEPENDENCE, CIGARETTES, UNCOMPLICATED: Primary | ICD-10-CM

## 2023-11-30 ENCOUNTER — HOSPITAL ENCOUNTER (EMERGENCY)
Facility: HOSPITAL | Age: 48
Discharge: HOME | End: 2023-11-30
Attending: EMERGENCY MEDICINE
Payer: MEDICAID

## 2023-11-30 VITALS
OXYGEN SATURATION: 97 % | WEIGHT: 175 LBS | RESPIRATION RATE: 19 BRPM | HEIGHT: 63 IN | TEMPERATURE: 98 F | HEART RATE: 95 BPM | SYSTOLIC BLOOD PRESSURE: 189 MMHG | BODY MASS INDEX: 31.01 KG/M2 | DIASTOLIC BLOOD PRESSURE: 97 MMHG

## 2023-11-30 DIAGNOSIS — R52 PAIN: ICD-10-CM

## 2023-11-30 DIAGNOSIS — S72.415A CLOSED NONDISPLACED FRACTURE OF CONDYLE OF LEFT FEMUR, INITIAL ENCOUNTER: Primary | ICD-10-CM

## 2023-11-30 PROCEDURE — 96372 THER/PROPH/DIAG INJ SC/IM: CPT | Performed by: NURSE PRACTITIONER

## 2023-11-30 PROCEDURE — 63600175 PHARM REV CODE 636 W HCPCS: Performed by: NURSE PRACTITIONER

## 2023-11-30 PROCEDURE — 99284 EMERGENCY DEPT VISIT MOD MDM: CPT | Mod: 25

## 2023-11-30 RX ORDER — HYDROCODONE BITARTRATE AND ACETAMINOPHEN 5; 325 MG/1; MG/1
1 TABLET ORAL EVERY 6 HOURS PRN
Qty: 12 TABLET | Refills: 0 | Status: SHIPPED | OUTPATIENT
Start: 2023-11-30 | End: 2023-12-12

## 2023-11-30 RX ORDER — KETOROLAC TROMETHAMINE 30 MG/ML
15 INJECTION, SOLUTION INTRAMUSCULAR; INTRAVENOUS
Status: COMPLETED | OUTPATIENT
Start: 2023-11-30 | End: 2023-11-30

## 2023-11-30 RX ADMIN — KETOROLAC TROMETHAMINE 15 MG: 30 INJECTION INTRAMUSCULAR; INTRAVENOUS at 09:11

## 2023-11-30 NOTE — ED PROVIDER NOTES
Encounter Date: 11/30/2023       History     Chief Complaint   Patient presents with    Fall     Patient states she fell at a store last night and has swelling on her left knee      Patient is a 48 y.o. female who presents to the ED 11/30/2023 with a chief complaint of left knee pain that occurred after a fall that occurred at approximately 7 pm yesterday.  Patient states she was in a store using crutches when her crutch got caught on a rug and she fell onto her left knee.  She did hit the right side of her face she felt a L on her forehead.  She did not black out lose consciousness. She denies any vision changes, numbness, or weakness. She denies any neck or back pain.  She states she is on crutches because she needs a new left hip replacement.  She states she currently has a hip replacement but there has been a hardware complication and she is scheduled to have surgery with a new orthopedic Dr. Pacheco as soon as she can be nicotine free for 2 weeks.  She states her hip actually feels good today and felt good yesterday and is not hurting her but she has acute pain in the left knee. She has not been walking on it today and is using crutches.              Review of patient's allergies indicates:   Allergen Reactions    Propoxyphene n-acetaminophen      Nausea^    Tramadol hcl      Nausea^     Past Medical History:   Diagnosis Date    Back injury     Femur fracture     Sciatic nerve pain     Smoker      Past Surgical History:   Procedure Laterality Date    OPEN REDUCTION AND INTERNAL FIXATION (ORIF) OF INTERTROCHANTERIC FRACTURE OF FEMUR Left 11/6/2022    Procedure: ORIF, FRACTURE, FEMUR, INTERTROCHANTERIC;  Surgeon: Mango Sauceda MD;  Location: [facility];  Service: Orthopedics;  Laterality: Left;    TUBAL LIGATION       Family History   Problem Relation Age of Onset    Heart disease Mother     Heart disease Father      Social History     Tobacco Use    Smoking status: Every Day     Current packs/day: 0.25      Types: Cigarettes    Smokeless tobacco: Never   Substance Use Topics    Alcohol use: Yes     Alcohol/week: 14.0 standard drinks of alcohol     Types: 14 Cans of beer per week    Drug use: Not Currently     Types: Marijuana     Comment: prn pain     Review of Systems   Constitutional:  Negative for chills and fever.   HENT:  Negative for sore throat.    Respiratory:  Negative for chest tightness and shortness of breath.    Cardiovascular:  Negative for chest pain.   Gastrointestinal:  Negative for abdominal pain.   Genitourinary:  Negative for dysuria.   Musculoskeletal:  Positive for arthralgias. Negative for myalgias.   Skin:  Negative for rash and wound.   Neurological:  Negative for syncope, weakness and numbness.   Hematological:  Does not bruise/bleed easily.       Physical Exam     Initial Vitals [11/30/23 0919]   BP Pulse Resp Temp SpO2   (!) 189/97 95 19 98.2 °F (36.8 °C) 97 %      MAP       --         Physical Exam    Nursing note and vitals reviewed.  Constitutional: Vital signs are normal. She appears well-developed and well-nourished.   HENT:   Head: Normocephalic and atraumatic.   Eyes: Pupils are equal, round, and reactive to light.   Neck: Neck supple.    Full passive range of motion without pain.     Cardiovascular:  Normal rate, regular rhythm, normal heart sounds and intact distal pulses.     Exam reveals no gallop and no friction rub.       No murmur heard.  Pulses:       Dorsalis pedis pulses are 2+ on the left side.        Posterior tibial pulses are 2+ on the left side.   Pulmonary/Chest: Breath sounds normal. She has no wheezes. She has no rhonchi. She has no rales.   Abdominal: Abdomen is soft. Bowel sounds are normal. There is no abdominal tenderness.   Musculoskeletal:      Cervical back: Full passive range of motion without pain and neck supple. No rigidity. No spinous process tenderness or muscular tenderness. Normal range of motion.      Left upper leg: Normal.      Left knee: Swelling  and bony tenderness present. No deformity, effusion, erythema, ecchymosis or lacerations. Decreased range of motion. Tenderness present over the medial joint line and lateral joint line. Normal alignment and normal meniscus. Normal pulse.      Left lower leg: Normal.      Comments: Tenderness over the anterior and posterior knee with swelling without erythema.      Neurological: She is alert and oriented to person, place, and time. She has normal strength.   No focal neurological deficits noted. Cranial nerves III-XII grossly intact. Equal rapid alternating movements noted.       Skin: Skin is warm, dry and intact.   Psychiatric: She has a normal mood and affect. Her speech is normal and behavior is normal.         ED Course   Procedures  Labs Reviewed - No data to display       Imaging Results              CT Knee Without Contrast Left (Final result)  Result time 11/30/23 11:09:00      Final result by Oscar Joyner MD (11/30/23 11:09:00)                   Impression:      Nondisplaced fracture of the medial femoral condyle with large volume lipohemarthrosis.      Electronically signed by: Oscar Joyner  Date:    11/30/2023  Time:    11:09               Narrative:    EXAMINATION:  CT KNEE WITHOUT CONTRAST LEFT    CLINICAL HISTORY:  Fracture, knee;    TECHNIQUE:  Noncontrast transaxial CT images through the left knee.  Multiplanar reformats.  3D reformats.    COMPARISON:  Same-day radiograph    FINDINGS:  There is an oblique longitudinal nondisplaced fracture along the posterior weight-bearing medial femoral condyle best seen on sagittal series 200, image 36.  There is large volume joint effusion with hematocrit level.  No malalignment.  Minimal degenerative change in the medial knee compartment with remaining joint spaces relatively well preserved.  Osteopenia.  Faint atherosclerosis.  There is moderate sized Baker cyst containing hyperdense material, presumably hemorrhage.                                        X-Ray Knee 1 or 2 View Left (Final result)  Result time 11/30/23 10:12:00   Procedure changed from X-Ray Knee 3 View Left     Final result by Oscar Joyner MD (11/30/23 10:12:00)                   Impression:      There is no displaced fracture although lipohemarthrosis suggests a radiographically occult fracture.  CT may add further characterization.    Findings discussed with Dr. Villanueva by telephone at 10:05.      Electronically signed by: Oscar Joyner  Date:    11/30/2023  Time:    10:12               Narrative:    EXAMINATION:  XR KNEE 1 OR 2 VIEW LEFT    CLINICAL HISTORY:  pain; Pain, unspecified    TECHNIQUE:  One or two views of the left knee were performed.    COMPARISON:  None    FINDINGS:  There is no displaced fracture.  There is subtle cortical contour deformity along the anterior tibia only seen on lateral image.  Unclear whether this is projectional or reflects a nondisplaced fracture.    No malalignment.  Relative preservation of joint spaces.  Large volume lipohemarthrosis.  Osteopenia and atherosclerosis.                                       Medications   ketorolac injection 15 mg (15 mg Intramuscular Given 11/30/23 0955)     Medical Decision Making  Amount and/or Complexity of Data Reviewed  Radiology: ordered.    Risk  Prescription drug management.         APC / Resident Notes:   Patient is a 48 y.o. female who presents to the ED 11/30/2023 who underwent emergent evaluation for left knee pain after fall that occurred yesterday. Xray with generalized swelling and tenderness. No erythema or warmth. She can flex it 20 degrees and has normal extension. She has + 2 DP and PT pulses to the LLE. I do not think septic joint or other emergent process such as compartment syndrome. Initial xray concerning for occult fx and CT is ordered consistent with Nondisplaced fracture of the medial femoral condyle with large volume lipohemarthrosis.  Spoke with Dr. Pacheco patient's orthopedic who  recommends knee immobilizer with rolling walker and touchdown weight-bearing as tolerated it.  This is discussed with patient.  Short course of pain medication is called into her local pharmacy. Based on my clinical evaluation, I do not appreciate any immediate, emergent, or life threatening condition or etiology that warrants additional workup today and feel that the patient can be discharged with close follow up care. Case discussed with Dr. Villanueva who is agreeable to plan of care. Follow up and return precautions discussed; patient verbalized understanding and is agreeable to plan of care. Patient discharged home in stable condition.                      Attending Attestation:     Physician Attestation Statement for NP/PA:   I have directed and reviewed the workup performed by the PA/NP.  I performed the substantive portion of the medical decision making.     Other NP/PA Attestation Additions:    History of Present Illness: 48-year-old female presents with knee pain status post injury.    Medical Decision Making: Initial differential diagnosis included but not limited to fracture, dislocation, and contusion.  The patient's x-ray does show evidence of a fracture to the region.  Radiology did recommend a CT scan for better delineation.  The patient's CT scan does show a nondisplaced fracture of the condyle of the left femur per Radiology.  The case was discussed with the patient's orthopedic physician.  I am in agreement with the nurse practitioner's assessment, treatment, and plan of care.                        Medical Decision Making:   Differential Diagnosis:   Fracture  Contusion  dislocation             Clinical Impression:  Final diagnoses:  [R52] Pain  [S72.415A] Closed nondisplaced fracture of condyle of left femur, initial encounter (Primary)          ED Disposition Condition    Discharge Stable          ED Prescriptions       Medication Sig Dispense Start Date End Date Auth. Provider     HYDROcodone-acetaminophen (NORCO) 5-325 mg per tablet Take 1 tablet by mouth every 6 (six) hours as needed for Pain. 12 tablet 11/30/2023 12/12/2023 Joanie Persaud NP          Follow-up Information       Follow up With Specialties Details Why Contact Info Additional Information    Merlin Pacheco MD Orthopedic Surgery In 1 week  [address] [place] [address]  [phone]       [facility] - ED Emergency Medicine  As needed, If symptoms worsen [address] [place] [address] [address] [place] [address] 1st floor             Joanie Persaud NP  11/30/23 1710       Prabhjot Villanueva MD  11/30/23 8397

## 2023-12-01 DIAGNOSIS — S72.92XK CLOSED FRACTURE OF LEFT FEMUR WITH NONUNION, UNSPECIFIED FRACTURE MORPHOLOGY, UNSPECIFIED PORTION OF FEMUR, SUBSEQUENT ENCOUNTER: Primary | ICD-10-CM

## 2023-12-07 ENCOUNTER — OFFICE VISIT (OUTPATIENT)
Dept: ORTHOPEDICS | Facility: CLINIC | Age: 48
End: 2023-12-07
Payer: MEDICAID

## 2023-12-07 ENCOUNTER — HOSPITAL ENCOUNTER (OUTPATIENT)
Dept: RADIOLOGY | Facility: HOSPITAL | Age: 48
Discharge: HOME | End: 2023-12-07
Attending: ORTHOPAEDIC SURGERY
Payer: MEDICAID

## 2023-12-07 VITALS — HEIGHT: 63 IN | BODY MASS INDEX: 31.01 KG/M2 | WEIGHT: 175 LBS

## 2023-12-07 DIAGNOSIS — S72.435A CLOSED NONDISPLACED FRACTURE OF MEDIAL CONDYLE OF LEFT FEMUR, INITIAL ENCOUNTER: Primary | ICD-10-CM

## 2023-12-07 DIAGNOSIS — S72.92XK CLOSED FRACTURE OF LEFT FEMUR WITH NONUNION, UNSPECIFIED FRACTURE MORPHOLOGY, UNSPECIFIED PORTION OF FEMUR, SUBSEQUENT ENCOUNTER: ICD-10-CM

## 2023-12-07 PROCEDURE — 27508 TREATMENT OF THIGH FRACTURE: CPT | Mod: PBBFAC,PO | Performed by: ORTHOPAEDIC SURGERY

## 2023-12-07 PROCEDURE — 27508 TREATMENT OF THIGH FRACTURE: CPT | Mod: S$PBB,LT,, | Performed by: ORTHOPAEDIC SURGERY

## 2023-12-07 PROCEDURE — 1159F PR MEDICATION LIST DOCUMENTED IN MEDICAL RECORD: ICD-10-PCS | Mod: CPTII,,, | Performed by: ORTHOPAEDIC SURGERY

## 2023-12-07 PROCEDURE — 99214 OFFICE O/P EST MOD 30 MIN: CPT | Mod: S$PBB,57,, | Performed by: ORTHOPAEDIC SURGERY

## 2023-12-07 PROCEDURE — 99212 OFFICE O/P EST SF 10 MIN: CPT | Mod: PBBFAC,PO,25 | Performed by: ORTHOPAEDIC SURGERY

## 2023-12-07 PROCEDURE — 27508 PR CLOSED RX FEMUR,DISTAL: ICD-10-PCS | Mod: S$PBB,LT,, | Performed by: ORTHOPAEDIC SURGERY

## 2023-12-07 PROCEDURE — 73552 XR FEMUR 2 VIEW LEFT: ICD-10-PCS | Mod: 26,LT,, | Performed by: RADIOLOGY

## 2023-12-07 PROCEDURE — 3008F BODY MASS INDEX DOCD: CPT | Mod: CPTII,,, | Performed by: ORTHOPAEDIC SURGERY

## 2023-12-07 PROCEDURE — 4010F PR ACE/ARB THEARPY RXD/TAKEN: ICD-10-PCS | Mod: CPTII,,, | Performed by: ORTHOPAEDIC SURGERY

## 2023-12-07 PROCEDURE — 3008F PR BODY MASS INDEX (BMI) DOCUMENTED: ICD-10-PCS | Mod: CPTII,,, | Performed by: ORTHOPAEDIC SURGERY

## 2023-12-07 PROCEDURE — 73552 X-RAY EXAM OF FEMUR 2/>: CPT | Mod: 26,LT,, | Performed by: RADIOLOGY

## 2023-12-07 PROCEDURE — 4010F ACE/ARB THERAPY RXD/TAKEN: CPT | Mod: CPTII,,, | Performed by: ORTHOPAEDIC SURGERY

## 2023-12-07 PROCEDURE — 1160F PR REVIEW ALL MEDS BY PRESCRIBER/CLIN PHARMACIST DOCUMENTED: ICD-10-PCS | Mod: CPTII,,, | Performed by: ORTHOPAEDIC SURGERY

## 2023-12-07 PROCEDURE — 1160F RVW MEDS BY RX/DR IN RCRD: CPT | Mod: CPTII,,, | Performed by: ORTHOPAEDIC SURGERY

## 2023-12-07 PROCEDURE — 1159F MED LIST DOCD IN RCRD: CPT | Mod: CPTII,,, | Performed by: ORTHOPAEDIC SURGERY

## 2023-12-07 PROCEDURE — 99214 PR OFFICE/OUTPT VISIT, EST, LEVL IV, 30-39 MIN: ICD-10-PCS | Mod: S$PBB,57,, | Performed by: ORTHOPAEDIC SURGERY

## 2023-12-07 PROCEDURE — 73552 X-RAY EXAM OF FEMUR 2/>: CPT | Mod: TC,PO,LT

## 2023-12-07 PROCEDURE — 99999 PR PBB SHADOW E&M-EST. PATIENT-LVL II: CPT | Mod: PBBFAC,,, | Performed by: ORTHOPAEDIC SURGERY

## 2023-12-07 PROCEDURE — 99999 PR PBB SHADOW E&M-EST. PATIENT-LVL II: ICD-10-PCS | Mod: PBBFAC,,, | Performed by: ORTHOPAEDIC SURGERY

## 2023-12-08 ENCOUNTER — HOSPITAL ENCOUNTER (OUTPATIENT)
Dept: RADIOLOGY | Facility: HOSPITAL | Age: 48
Discharge: HOME | End: 2023-12-08
Payer: MEDICAID

## 2023-12-08 VITALS — HEIGHT: 63 IN | BODY MASS INDEX: 31.02 KG/M2 | WEIGHT: 175.06 LBS

## 2023-12-08 DIAGNOSIS — N64.52 NIPPLE DISCHARGE: ICD-10-CM

## 2023-12-08 DIAGNOSIS — S72.92XK CLOSED FRACTURE OF LEFT FEMUR WITH NONUNION, UNSPECIFIED FRACTURE MORPHOLOGY, UNSPECIFIED PORTION OF FEMUR, SUBSEQUENT ENCOUNTER: Primary | ICD-10-CM

## 2023-12-08 DIAGNOSIS — F17.210 NICOTINE DEPENDENCE, CIGARETTES, UNCOMPLICATED: ICD-10-CM

## 2023-12-08 PROCEDURE — 77062 BREAST TOMOSYNTHESIS BI: CPT | Mod: TC,PO

## 2023-12-08 PROCEDURE — 71271 CT THORAX LUNG CANCER SCR C-: CPT | Mod: TC,PO

## 2023-12-08 PROCEDURE — 76642 ULTRASOUND BREAST LIMITED: CPT | Mod: TC,PO,LT

## 2023-12-08 RX ORDER — TRAMADOL HYDROCHLORIDE 50 MG/1
50 TABLET ORAL EVERY 6 HOURS PRN
Qty: 44 TABLET | Refills: 0 | Status: SHIPPED | OUTPATIENT
Start: 2023-12-08 | End: 2024-01-30 | Stop reason: SDUPTHER

## 2023-12-08 NOTE — TELEPHONE ENCOUNTER
----- Message from Andrei Gusman sent at 12/8/2023  9:57 AM CST -----  Regarding: Checking on Rx, call pt   Contact: pt   Checking on Rx, call pt

## 2023-12-15 NOTE — PROGRESS NOTES
Chief Complaint   Patient presents with    Left Knee - Pain    Left Hip - Pain       HPI:    This is a 48 y.o. who presents today complaining of left knee pain for 2 weeks after fall. Pain is throbbing. No numbness or tingling. No associated signs or symptoms.      Past Medical History:   Diagnosis Date    Back injury     Femur fracture     Sciatic nerve pain     Smoker       Past Surgical History:   Procedure Laterality Date    OPEN REDUCTION AND INTERNAL FIXATION (ORIF) OF INTERTROCHANTERIC FRACTURE OF FEMUR Left 11/6/2022    Procedure: ORIF, FRACTURE, FEMUR, INTERTROCHANTERIC;  Surgeon: Mango Sauceda MD;  Location: [facility];  Service: Orthopedics;  Laterality: Left;    TUBAL LIGATION        Current Outpatient Medications on File Prior to Visit   Medication Sig Dispense Refill    methocarbamoL (ROBAXIN) 750 MG Tab Take 500 mg by mouth 4 (four) times daily as needed.       Current Facility-Administered Medications on File Prior to Visit   Medication Dose Route Frequency Provider Last Rate Last Admin    sodium chloride 0.9% flush 10 mL  10 mL Intravenous Q6H PRN Merlin Pacheco MD          Review of patient's allergies indicates:   Allergen Reactions    Propoxyphene n-acetaminophen      Nausea^    Tramadol hcl      Nausea^      Family History not pertinent   Social History     Socioeconomic History    Marital status:    Tobacco Use    Smoking status: Every Day     Current packs/day: 0.25     Types: Cigarettes    Smokeless tobacco: Never   Substance and Sexual Activity    Alcohol use: Yes     Alcohol/week: 14.0 standard drinks of alcohol     Types: 14 Cans of beer per week    Drug use: Not Currently     Types: Marijuana     Comment: prn pain     Social Determinants of Health     Financial Resource Strain: Unknown (11/6/2022)    Overall Financial Resource Strain (CARDIA)     Difficulty of Paying Living Expenses: Patient refused   Food Insecurity: Unknown (11/6/2022)    Hunger Vital Sign     Worried  About Running Out of Food in the Last Year: Patient refused     Ran Out of Food in the Last Year: Patient refused   Transportation Needs: Unknown (11/6/2022)    PRAPARE - Transportation     Lack of Transportation (Medical): Patient refused     Lack of Transportation (Non-Medical): Patient refused   Physical Activity: Unknown (11/6/2022)    Exercise Vital Sign     Days of Exercise per Week: Patient refused     Minutes of Exercise per Session: Patient refused   Stress: Unknown (11/6/2022)    [employer] [place] [employer] - Occupational Stress Questionnaire     Feeling of Stress : Patient refused   Social Connections: Unknown (11/6/2022)    Social Connection and Isolation Panel [NHANES]     Frequency of Communication with Friends and Family: Patient refused     Frequency of Social Gatherings with Friends and Family: Patient refused     Attends Catholic Services: Patient refused     Active Member of Clubs or Organizations: Patient refused     Attends Club or Organization Meetings: Patient refused     Marital Status: Patient refused   Housing Stability: Unknown (11/6/2022)    Housing Stability Vital Sign     Unable to Pay for Housing in the Last Year: Patient refused     Unstable Housing in the Last Year: Patient refused         Review of Systems:   Constitutional:  Denies fever or chills    Eyes:  Denies change in visual acuity    HENT:  Denies nasal congestion or sore throat    Respiratory:  Denies cough or shortness of breath    Cardiovascular:  Denies chest pain or edema    GI:  Denies abdominal pain, nausea, vomiting, bloody stools or diarrhea    :  Denies dysuria    Integument:  Denies rash    Neurologic:  Denies headache, focal weakness or sensory changes    Endocrine:  Denies polyuria or polydipsia    Lymphatic:  Denies swollen glands    Psychiatric:  Denies depression or anxiety       Physical Exam:    Constitutional:  Well developed, well nourished, no acute distress, non-toxic appearance     Integument:  Well hydrated, no rash    Lymphatic:  No lymphadenopathy noted    Neurologic:  Alert & oriented x 3,     Psychiatric:  Speech and behavior appropriate    Gi: abdomen soft  Eyes: EOMI   R knee  Exam performed same as contralateral side and is normal  L knee  No ROM due to pain. Point TTP about the joint lines. Overall normal alignment. Skin intact. NVI distally.      X-rays were performed today, personally reviewed by me and findings discussed with the patient.   3 views of the left knee as well as CT show nondisplaced medial condyle fracture      Closed nondisplaced fracture of medial condyle of left femur, initial encounter        Will treat nonop. Hinged brace. TDWB LLE. Smoking cessation. RTC 3 weeks.

## 2023-12-20 DIAGNOSIS — S72.435A CLOSED NONDISPLACED FRACTURE OF MEDIAL CONDYLE OF LEFT FEMUR, INITIAL ENCOUNTER: Primary | ICD-10-CM

## 2023-12-28 ENCOUNTER — OFFICE VISIT (OUTPATIENT)
Dept: ORTHOPEDICS | Facility: CLINIC | Age: 48
End: 2023-12-28
Payer: MEDICAID

## 2023-12-28 ENCOUNTER — HOSPITAL ENCOUNTER (OUTPATIENT)
Dept: RADIOLOGY | Facility: HOSPITAL | Age: 48
Discharge: HOME | End: 2023-12-28
Attending: ORTHOPAEDIC SURGERY
Payer: MEDICAID

## 2023-12-28 VITALS — HEIGHT: 63 IN | BODY MASS INDEX: 31.01 KG/M2 | WEIGHT: 175 LBS

## 2023-12-28 DIAGNOSIS — S72.435A CLOSED NONDISPLACED FRACTURE OF MEDIAL CONDYLE OF LEFT FEMUR, INITIAL ENCOUNTER: ICD-10-CM

## 2023-12-28 DIAGNOSIS — S72.435D CLOSED NONDISPLACED FRACTURE OF MEDIAL CONDYLE OF LEFT FEMUR WITH ROUTINE HEALING, SUBSEQUENT ENCOUNTER: Primary | ICD-10-CM

## 2023-12-28 PROCEDURE — 73552 X-RAY EXAM OF FEMUR 2/>: CPT | Mod: TC,PO,LT

## 2023-12-28 PROCEDURE — 73552 XR FEMUR 2 VIEW LEFT: ICD-10-PCS | Mod: 26,LT,, | Performed by: RADIOLOGY

## 2023-12-28 PROCEDURE — 1160F RVW MEDS BY RX/DR IN RCRD: CPT | Mod: CPTII,,, | Performed by: ORTHOPAEDIC SURGERY

## 2023-12-28 PROCEDURE — 99999 PR PBB SHADOW E&M-EST. PATIENT-LVL II: CPT | Mod: PBBFAC,,, | Performed by: ORTHOPAEDIC SURGERY

## 2023-12-28 PROCEDURE — 99212 OFFICE O/P EST SF 10 MIN: CPT | Mod: PBBFAC,PO | Performed by: ORTHOPAEDIC SURGERY

## 2023-12-28 PROCEDURE — 73552 X-RAY EXAM OF FEMUR 2/>: CPT | Mod: 26,LT,, | Performed by: RADIOLOGY

## 2023-12-28 PROCEDURE — 1159F MED LIST DOCD IN RCRD: CPT | Mod: CPTII,,, | Performed by: ORTHOPAEDIC SURGERY

## 2023-12-28 PROCEDURE — 99024 POSTOP FOLLOW-UP VISIT: CPT | Mod: ,,, | Performed by: ORTHOPAEDIC SURGERY

## 2024-01-04 NOTE — PROGRESS NOTES
Chief Complaint   Patient presents with    Left Femur - Follow-up, Pain         HPI:  48 y.o. returns to clinic today status post non-operative treatment of a  left femoral condyle fracture 2 weeks ago. Pain is mild. Patient is compliant most of the time with restrictions.     L knee  Improved ROM noted. Stable to stress. Skin intact. Compartments soft. NVI distally.     X-rays were performed today, personally reviewed by me and findings discussed with the patient.  2 views of the left femur show good overall alignment    Closed nondisplaced fracture of medial condyle of left femur with routine healing, subsequent encounter        Continue brace. RTC 1 month

## 2024-01-24 DIAGNOSIS — S72.435D CLOSED NONDISPLACED FRACTURE OF MEDIAL CONDYLE OF LEFT FEMUR WITH ROUTINE HEALING, SUBSEQUENT ENCOUNTER: Primary | ICD-10-CM

## 2024-01-30 ENCOUNTER — HOSPITAL ENCOUNTER (OUTPATIENT)
Dept: RADIOLOGY | Facility: HOSPITAL | Age: 49
Discharge: HOME | End: 2024-01-30
Attending: ORTHOPAEDIC SURGERY
Payer: MEDICAID

## 2024-01-30 ENCOUNTER — OFFICE VISIT (OUTPATIENT)
Dept: ORTHOPEDICS | Facility: CLINIC | Age: 49
End: 2024-01-30
Payer: MEDICAID

## 2024-01-30 VITALS — BODY MASS INDEX: 31.02 KG/M2 | WEIGHT: 175.06 LBS | HEIGHT: 63 IN

## 2024-01-30 DIAGNOSIS — S72.92XK CLOSED FRACTURE OF LEFT FEMUR WITH NONUNION, UNSPECIFIED FRACTURE MORPHOLOGY, UNSPECIFIED PORTION OF FEMUR, SUBSEQUENT ENCOUNTER: ICD-10-CM

## 2024-01-30 DIAGNOSIS — S72.435D CLOSED NONDISPLACED FRACTURE OF MEDIAL CONDYLE OF LEFT FEMUR WITH ROUTINE HEALING, SUBSEQUENT ENCOUNTER: ICD-10-CM

## 2024-01-30 DIAGNOSIS — S72.435D CLOSED NONDISPLACED FRACTURE OF MEDIAL CONDYLE OF LEFT FEMUR WITH ROUTINE HEALING, SUBSEQUENT ENCOUNTER: Primary | ICD-10-CM

## 2024-01-30 PROCEDURE — 4010F ACE/ARB THERAPY RXD/TAKEN: CPT | Mod: CPTII,,, | Performed by: ORTHOPAEDIC SURGERY

## 2024-01-30 PROCEDURE — 99213 OFFICE O/P EST LOW 20 MIN: CPT | Mod: PBBFAC,PO | Performed by: ORTHOPAEDIC SURGERY

## 2024-01-30 PROCEDURE — 1160F RVW MEDS BY RX/DR IN RCRD: CPT | Mod: CPTII,,, | Performed by: ORTHOPAEDIC SURGERY

## 2024-01-30 PROCEDURE — 73552 X-RAY EXAM OF FEMUR 2/>: CPT | Mod: TC,PO,LT

## 2024-01-30 PROCEDURE — 73552 X-RAY EXAM OF FEMUR 2/>: CPT | Mod: 26,LT,, | Performed by: RADIOLOGY

## 2024-01-30 PROCEDURE — 99024 POSTOP FOLLOW-UP VISIT: CPT | Mod: ,,, | Performed by: ORTHOPAEDIC SURGERY

## 2024-01-30 PROCEDURE — 1159F MED LIST DOCD IN RCRD: CPT | Mod: CPTII,,, | Performed by: ORTHOPAEDIC SURGERY

## 2024-01-30 PROCEDURE — 99999 PR PBB SHADOW E&M-EST. PATIENT-LVL III: CPT | Mod: PBBFAC,,, | Performed by: ORTHOPAEDIC SURGERY

## 2024-01-30 RX ORDER — METHOCARBAMOL 750 MG/1
750 TABLET, FILM COATED ORAL 4 TIMES DAILY PRN
Qty: 44 TABLET | Refills: 3 | Status: SHIPPED | OUTPATIENT
Start: 2024-01-30 | End: 2024-04-16 | Stop reason: SDUPTHER

## 2024-01-30 RX ORDER — TRAMADOL HYDROCHLORIDE 50 MG/1
50 TABLET ORAL EVERY 6 HOURS PRN
Qty: 44 TABLET | Refills: 0 | Status: SHIPPED | OUTPATIENT
Start: 2024-01-30 | End: 2024-03-12 | Stop reason: SDUPTHER

## 2024-02-06 NOTE — PROGRESS NOTES
Chief Complaint   Patient presents with    Left Femur - Injury     Follow up to non op fx    Left Knee - Injury     Follow up to non op fx         HPI:  48 y.o. returns to clinic today status post non-operative treatment of a  left MFC fracture 5 weeks ago. Pain is mild. Patient is compliant most of the time with restrictions.     L knee    No erythema or fluctuance. Overall normal alignment. Mild point TTP about the fracture site. Decreased ROM due to stiffness. Compartments soft. Skin intact. NVI distally.      X-rays were performed today, personally reviewed by me and findings discussed with the patient.  3 views of the left knee show good overall position with interval healing noted    Closed nondisplaced fracture of medial condyle of left femur with routine healing, subsequent encounter  -     Ambulatory referral/consult to Physical/Occupational Therapy; Future; Expected date: 02/06/2024    Closed fracture of left femur with nonunion, unspecified fracture morphology, unspecified portion of femur, subsequent encounter  -     traMADoL (ULTRAM) 50 mg tablet; Take 1 tablet (50 mg total) by mouth every 6 (six) hours as needed for Pain.  Dispense: 44 tablet; Refill: 0    Other orders  -     methocarbamoL (ROBAXIN) 750 MG Tab; Take 1 tablet (750 mg total) by mouth 4 (four) times daily as needed.  Dispense: 44 tablet; Refill: 3        Continue as discussed. Begin PT. RTC as needed.

## 2024-03-12 DIAGNOSIS — S72.92XK CLOSED FRACTURE OF LEFT FEMUR WITH NONUNION, UNSPECIFIED FRACTURE MORPHOLOGY, UNSPECIFIED PORTION OF FEMUR, SUBSEQUENT ENCOUNTER: ICD-10-CM

## 2024-03-12 RX ORDER — TRAMADOL HYDROCHLORIDE 50 MG/1
50 TABLET ORAL EVERY 6 HOURS PRN
Qty: 28 TABLET | Refills: 0 | Status: SHIPPED | OUTPATIENT
Start: 2024-03-12 | End: 2024-03-19

## 2024-03-12 NOTE — TELEPHONE ENCOUNTER
----- Message from Anita Noble sent at 3/12/2024 10:26 AM CDT -----  Regarding: Refill request  Contact: pt  Type:  RX Refill Request    Who Called: pt    Refill or New Rx:refill    RX Name and Strength:traMADoL (ULTRAM) 50 mg tablet    How is the patient currently taking it? (ex. 1XDay): 1/ day    Is this a 30 day or 90 day RX: 30    Preferred Pharmacy with phone number:  [facility] - [facility] [address] [facility]  [address] [address] [address]  Phone: [phone] Fax: [phone]    Local or Mail Order:local\    Ordering Provider:Junior    Would the patient rather a call back or a response via [facility]? Call back    Best Call Back Number:[phone]    Additional Information: refill request  Please advise.  Thank you.

## 2024-04-03 RX ORDER — TRAMADOL HYDROCHLORIDE 50 MG/1
50 TABLET ORAL EVERY 6 HOURS PRN
Qty: 22 TABLET | Refills: 0 | OUTPATIENT
Start: 2024-04-03 | End: 2024-04-10

## 2024-04-03 NOTE — TELEPHONE ENCOUNTER
----- Message from Myriam Roach sent at 4/3/2024 11:10 AM CDT -----  Type:  RX Refill Request    Who Called:  Pt    Refill or New Rx:  refill    RX Name and Strength:  tramadol 50 mg    How is the patient currently taking it? (ex. 1XDay):  as directed    Is this a 30 day or 90 day RX:  90    Preferred Pharmacy with phone number:    [facility] - [place], [place] - [id number] [facility]  [address] [address] [address]  Phone: [phone] Fax: [phone]    Local or Mail Order:  Local    Ordering Provider:  Dr Pacheco    Would the patient rather a call back or a response via [facility]?  Call back    Best Call Back Number:  [phone]    Additional Information:  Pt is needing a refill.   Please call back to advise. Thanks!

## 2024-04-16 RX ORDER — METHOCARBAMOL 750 MG/1
750 TABLET, FILM COATED ORAL 4 TIMES DAILY PRN
Qty: 44 TABLET | Refills: 3 | Status: SHIPPED | OUTPATIENT
Start: 2024-04-16

## 2024-04-18 ENCOUNTER — TELEPHONE (OUTPATIENT)
Dept: ORTHOPEDICS | Facility: CLINIC | Age: 49
End: 2024-04-18
Payer: MEDICAID

## 2024-04-18 NOTE — TELEPHONE ENCOUNTER
----- Message from Vaishali Lopez MA sent at 4/18/2024 11:20 AM CDT -----  Contact: pt  Calling for statement for temp disabled for her food stamps   Call back

## 2024-04-18 NOTE — TELEPHONE ENCOUNTER
Spoke with pt and advise that per dr. Pacheco, he do not write or fill out disable paperwork for snap benefits

## 2024-05-21 DIAGNOSIS — R10.30 LOWER ABDOMINAL PAIN, UNSPECIFIED: Primary | ICD-10-CM

## 2024-05-27 ENCOUNTER — HOSPITAL ENCOUNTER (OUTPATIENT)
Dept: RADIOLOGY | Facility: HOSPITAL | Age: 49
Discharge: HOME | End: 2024-05-27
Payer: MEDICAID

## 2024-05-27 DIAGNOSIS — R10.30 LOWER ABDOMINAL PAIN, UNSPECIFIED: ICD-10-CM

## 2024-05-27 PROCEDURE — 76856 US EXAM PELVIC COMPLETE: CPT | Mod: TC,PO

## 2024-05-27 PROCEDURE — 76856 US EXAM PELVIC COMPLETE: CPT | Mod: 26,,, | Performed by: RADIOLOGY

## 2024-05-27 PROCEDURE — 76830 TRANSVAGINAL US NON-OB: CPT | Mod: 26,,, | Performed by: RADIOLOGY

## 2024-05-27 PROCEDURE — 76830 TRANSVAGINAL US NON-OB: CPT | Mod: TC,PO

## 2024-06-27 ENCOUNTER — TELEPHONE (OUTPATIENT)
Dept: ORTHOPEDICS | Facility: CLINIC | Age: 49
End: 2024-06-27
Payer: MEDICAID

## 2024-06-27 DIAGNOSIS — Z01.818 PRE-OP TESTING: Primary | ICD-10-CM

## 2024-06-27 NOTE — TELEPHONE ENCOUNTER
----- Message from Cory Seo MA sent at 6/27/2024 10:46 AM CDT -----  Contact: patient  Patient stated she quit smoking and was told to call Dr. Pacheco directly so she can come to office to be nicotine tested????    Call back number is [phone]

## 2025-01-06 ENCOUNTER — OFFICE VISIT (OUTPATIENT)
Dept: URGENT CARE | Facility: CLINIC | Age: 50
End: 2025-01-06
Payer: MEDICAID

## 2025-01-06 VITALS
WEIGHT: 220 LBS | RESPIRATION RATE: 16 BRPM | SYSTOLIC BLOOD PRESSURE: 130 MMHG | BODY MASS INDEX: 38.98 KG/M2 | DIASTOLIC BLOOD PRESSURE: 85 MMHG | TEMPERATURE: 99 F | HEIGHT: 63 IN | OXYGEN SATURATION: 100 % | HEART RATE: 96 BPM

## 2025-01-06 DIAGNOSIS — J06.9 VIRAL URI: Primary | ICD-10-CM

## 2025-01-06 DIAGNOSIS — R09.81 NASAL CONGESTION: ICD-10-CM

## 2025-01-06 DIAGNOSIS — R05.9 COUGH, UNSPECIFIED TYPE: ICD-10-CM

## 2025-01-06 LAB
CTP QC/QA: YES
CTP QC/QA: YES
FLUAV AG NPH QL: NEGATIVE
FLUBV AG NPH QL: NEGATIVE
SARS-COV-2 AG RESP QL IA.RAPID: NEGATIVE

## 2025-01-06 PROCEDURE — 87811 SARS-COV-2 COVID19 W/OPTIC: CPT | Mod: QW,S$GLB,, | Performed by: NURSE PRACTITIONER

## 2025-01-06 PROCEDURE — 99214 OFFICE O/P EST MOD 30 MIN: CPT | Mod: S$GLB,,, | Performed by: NURSE PRACTITIONER

## 2025-01-06 PROCEDURE — 87804 INFLUENZA ASSAY W/OPTIC: CPT | Mod: QW,,, | Performed by: NURSE PRACTITIONER

## 2025-01-06 RX ORDER — GUAIFENESIN 600 MG/1
1200 TABLET, EXTENDED RELEASE ORAL 2 TIMES DAILY
Qty: 40 TABLET | Refills: 0 | Status: SHIPPED | OUTPATIENT
Start: 2025-01-06 | End: 2025-01-16

## 2025-01-06 RX ORDER — FLUTICASONE PROPIONATE 50 MCG
1 SPRAY, SUSPENSION (ML) NASAL DAILY
Qty: 18.2 ML | Refills: 0 | Status: SHIPPED | OUTPATIENT
Start: 2025-01-06

## 2025-01-06 NOTE — PATIENT INSTRUCTIONS
- Rest.    - Drink plenty of fluids.  - Viral upper respiratory infections typically run their course in 10-14 days.      - You can take over-the-counter claritin, zyrtec, allegra, or xyzal as directed. These are antihistamines that can help with runny nose, nasal congestion, sneezing, and helps to dry up post-nasal drip, which usually causes sore throat and cough.    - You can take plain Mucinex (guaifenesin) 1200 mg twice a day to help loosen mucous.        - You can use Flonase (fluticasone) nasal spray as directed for sinus congestion and postnasal drip. This is a steroid nasal spray that works locally over time to decrease the inflammation in your nose/sinuses and help with allergic symptoms. This is not an quick- relief spray like afrin, but it works well if used daily.  Discontinue if you develop nose bleed  - Use nasal saline prior to Flonase.  - Use Ocean Spray Nasal Saline 1-3 puffs each nostril every 2-3 hours then blow out onto tissue. This is to irrigate the nasal passage way to clear the sinus openings. Use until sinus problem resolved.    - A Neti Pot with sterile saline can help break up nasal congestion and give relief.      - Warm salt water gargles can help with sore throat     - Warm tea with honey can help with sore throat and cough. Honey is a natural cough suppressant.       Follow up if symptoms do not improve or worsen

## 2025-01-06 NOTE — PROGRESS NOTES
"Subjective:      Patient ID: Socorro Willis is a 49 y.o. female.    Vitals:  height is 5' 3" (1.6 m) and weight is 99.8 kg (220 lb). Her oral temperature is 98.5 °F (36.9 °C). Her blood pressure is 130/85 and her pulse is 96. Her respiration is 16 and oxygen saturation is 100%.     Chief Complaint: Cough (Cough, nasal congestion since yesterday. Acid reflux x 4 days)    Patient is a 49-year-old female with a past medical history of dyslipidemia and hypertension who presents with nasal congestion, sinus pressure and productive cough that started yesterday.  She reports clear mucous production.  Patient states her 14-month-old grandson is also sick.  She denies fever, chills, nausea and vomiting.    Cough  This is a new problem. The current episode started yesterday. The problem has been unchanged. The problem occurs constantly. The cough is Non-productive. Associated symptoms include headaches. She has tried OTC cough suppressant for the symptoms. The treatment provided mild relief.       HENT:  Positive for congestion and sinus pain.    Respiratory:  Positive for cough.    Neurological:  Positive for headaches.      Objective:     Physical Exam   Constitutional: She is oriented to person, place, and time. She appears well-developed. She is cooperative.  Non-toxic appearance. She does not appear ill. No distress.   HENT:   Head: Normocephalic and atraumatic.   Ears:   Right Ear: Hearing and external ear normal.   Left Ear: Hearing and external ear normal.   Nose: Nose normal. No mucosal edema, rhinorrhea or nasal deformity. No epistaxis. Right sinus exhibits no maxillary sinus tenderness and no frontal sinus tenderness. Left sinus exhibits no maxillary sinus tenderness and no frontal sinus tenderness.   Mouth/Throat: Uvula is midline, oropharynx is clear and moist and mucous membranes are normal. No trismus in the jaw. Normal dentition. No uvula swelling. No oropharyngeal exudate, posterior oropharyngeal edema or " posterior oropharyngeal erythema.   Eyes: Conjunctivae and lids are normal. No scleral icterus.   Neck: Trachea normal and phonation normal. Neck supple. No edema present. No erythema present. No neck rigidity present.   Cardiovascular: Regular rhythm, normal heart sounds and normal pulses. Tachycardia present.   Pulmonary/Chest: Effort normal and breath sounds normal. No respiratory distress. She has no decreased breath sounds. She has no rhonchi.   Abdominal: Normal appearance.   Musculoskeletal: Normal range of motion.         General: No deformity. Normal range of motion.   Neurological: She is alert and oriented to person, place, and time. She exhibits normal muscle tone. Coordination normal.   Skin: Skin is warm, dry, intact, not diaphoretic and not pale.   Psychiatric: Her speech is normal and behavior is normal. Judgment and thought content normal.   Nursing note and vitals reviewed.      Assessment:     1. Viral URI    2. Cough, unspecified type    3. Nasal congestion        Plan:       Viral URI    Cough, unspecified type  -     SARS Coronavirus 2 Antigen, POCT Manual Read  -     POCT Influenza A/B Rapid Antigen    Nasal congestion        Results for orders placed or performed in visit on 01/06/25   SARS Coronavirus 2 Antigen, POCT Manual Read    Collection Time: 01/06/25  8:47 AM   Result Value Ref Range    SARS Coronavirus 2 Antigen Negative Negative     Acceptable Yes    POCT Influenza A/B Rapid Antigen    Collection Time: 01/06/25  8:47 AM   Result Value Ref Range    Rapid Influenza A Ag Negative Negative    Rapid Influenza B Ag Negative Negative     Acceptable Yes         Mucinex   Flonase   Follow up if symptoms worsen

## 2025-06-17 ENCOUNTER — TELEPHONE (OUTPATIENT)
Dept: ORTHOPEDICS | Facility: CLINIC | Age: 50
End: 2025-06-17
Payer: MEDICAID

## 2025-07-18 ENCOUNTER — LAB VISIT (OUTPATIENT)
Dept: LAB | Facility: HOSPITAL | Age: 50
End: 2025-07-18
Attending: ORTHOPAEDIC SURGERY
Payer: MEDICAID

## 2025-07-18 DIAGNOSIS — Z01.818 PRE-OP TESTING: ICD-10-CM

## 2025-07-18 PROCEDURE — 80323 ALKALOIDS NOS: CPT

## 2025-07-18 PROCEDURE — 36415 COLL VENOUS BLD VENIPUNCTURE: CPT | Mod: PO

## 2025-07-22 LAB
COTININE SERPL-MCNC: <3 NG/ML
NICOTINE SERPL-MCNC: <3 NG/ML

## 2025-07-30 ENCOUNTER — TELEPHONE (OUTPATIENT)
Dept: ORTHOPEDICS | Facility: CLINIC | Age: 50
End: 2025-07-30
Payer: MEDICAID

## 2025-07-30 NOTE — TELEPHONE ENCOUNTER
----- Message from Med Assistant Tom sent at 7/30/2025 10:16 AM CDT -----  Contact: pt  Wants nicotine results   Call back  [phone]

## 2025-07-31 ENCOUNTER — PATIENT MESSAGE (OUTPATIENT)
Dept: ORTHOPEDICS | Facility: CLINIC | Age: 50
End: 2025-07-31
Payer: MEDICAID

## 2025-08-04 ENCOUNTER — TELEPHONE (OUTPATIENT)
Dept: ORTHOPEDICS | Facility: CLINIC | Age: 50
End: 2025-08-04
Payer: MEDICAID

## 2025-08-04 NOTE — TELEPHONE ENCOUNTER
Contacted patient. All questions and concerns have been answered about Nicotine test results. Patient stated she is ready to schedule surgery since she passed. Appointment date and time given. Patient verbalized understanding.

## 2025-08-04 NOTE — TELEPHONE ENCOUNTER
----- Message from Med Assistant Ray sent at 8/4/2025 10:29 AM CDT -----  Contact: patient  Pt calling in and was curious as to the results of her Nicotine test prior to her surgery.    Call back number is [phone]

## 2025-08-05 DIAGNOSIS — M25.552 PAIN IN LEFT HIP: Primary | ICD-10-CM

## 2025-08-12 ENCOUNTER — OFFICE VISIT (OUTPATIENT)
Dept: ORTHOPEDICS | Facility: CLINIC | Age: 50
End: 2025-08-12
Payer: MEDICAID

## 2025-08-12 ENCOUNTER — HOSPITAL ENCOUNTER (OUTPATIENT)
Dept: RADIOLOGY | Facility: HOSPITAL | Age: 50
Discharge: HOME | End: 2025-08-12
Attending: ORTHOPAEDIC SURGERY
Payer: MEDICAID

## 2025-08-12 VITALS — HEIGHT: 63 IN | WEIGHT: 208.56 LBS | BODY MASS INDEX: 36.95 KG/M2

## 2025-08-12 DIAGNOSIS — Z01.818 PRE-OP TESTING: ICD-10-CM

## 2025-08-12 DIAGNOSIS — S72.92XK CLOSED FRACTURE OF LEFT FEMUR WITH NONUNION, UNSPECIFIED FRACTURE MORPHOLOGY, UNSPECIFIED PORTION OF FEMUR, SUBSEQUENT ENCOUNTER: Primary | ICD-10-CM

## 2025-08-12 DIAGNOSIS — M25.552 PAIN IN LEFT HIP: ICD-10-CM

## 2025-08-12 PROCEDURE — 99214 OFFICE O/P EST MOD 30 MIN: CPT | Mod: PBBFAC,25,PO | Performed by: ORTHOPAEDIC SURGERY

## 2025-08-12 PROCEDURE — 1159F MED LIST DOCD IN RCRD: CPT | Mod: CPTII,,, | Performed by: ORTHOPAEDIC SURGERY

## 2025-08-12 PROCEDURE — 3008F BODY MASS INDEX DOCD: CPT | Mod: CPTII,,, | Performed by: ORTHOPAEDIC SURGERY

## 2025-08-12 PROCEDURE — 3044F HG A1C LEVEL LT 7.0%: CPT | Mod: CPTII,,, | Performed by: ORTHOPAEDIC SURGERY

## 2025-08-12 PROCEDURE — 99214 OFFICE O/P EST MOD 30 MIN: CPT | Mod: S$PBB,,, | Performed by: ORTHOPAEDIC SURGERY

## 2025-08-12 PROCEDURE — 99999 PR PBB SHADOW E&M-EST. PATIENT-LVL IV: CPT | Mod: PBBFAC,,, | Performed by: ORTHOPAEDIC SURGERY

## 2025-08-12 PROCEDURE — 73502 X-RAY EXAM HIP UNI 2-3 VIEWS: CPT | Mod: TC,PO,LT

## 2025-08-12 PROCEDURE — 4010F ACE/ARB THERAPY RXD/TAKEN: CPT | Mod: CPTII,,, | Performed by: ORTHOPAEDIC SURGERY

## 2025-08-12 PROCEDURE — 73502 X-RAY EXAM HIP UNI 2-3 VIEWS: CPT | Mod: 26,LT,, | Performed by: RADIOLOGY

## 2025-08-12 PROCEDURE — 1160F RVW MEDS BY RX/DR IN RCRD: CPT | Mod: CPTII,,, | Performed by: ORTHOPAEDIC SURGERY

## 2025-08-12 RX ORDER — SODIUM CHLORIDE 0.9 % (FLUSH) 0.9 %
10 SYRINGE (ML) INJECTION EVERY 6 HOURS PRN
Status: SHIPPED | OUTPATIENT
Start: 2025-08-29

## 2025-08-12 RX ORDER — CEFAZOLIN SODIUM 2 G/50ML
2 SOLUTION INTRAVENOUS
OUTPATIENT
Start: 2025-08-12

## 2025-08-12 RX ORDER — TOLMETIN SODIUM 600 MG/1
600 TABLET, FILM COATED ORAL 3 TIMES DAILY
Qty: 90 EACH | Refills: 3 | Status: SHIPPED | OUTPATIENT
Start: 2025-08-12

## 2025-08-15 DIAGNOSIS — E75.5 OTHER LIPID STORAGE DISORDERS: Primary | ICD-10-CM

## 2025-08-15 DIAGNOSIS — Z12.31 ENCOUNTER FOR SCREENING MAMMOGRAM FOR MALIGNANT NEOPLASM OF BREAST: Primary | ICD-10-CM

## 2025-08-15 DIAGNOSIS — E75.5 OTHER LIPID STORAGE DISORDERS: ICD-10-CM

## 2025-08-15 DIAGNOSIS — F17.210 NICOTINE DEPENDENCE, CIGARETTES, UNCOMPLICATED: Primary | ICD-10-CM

## 2025-08-29 PROBLEM — S72.92XK CLOSED FRACTURE OF LEFT FEMUR WITH NONUNION: Status: ACTIVE | Noted: 2025-08-29

## 2025-08-30 PROBLEM — Z73.6 LIMITATION DUE TO DISABILITY: Status: ACTIVE | Noted: 2025-08-30

## 2025-09-01 DIAGNOSIS — S72.92XK CLOSED FRACTURE OF LEFT FEMUR WITH NONUNION, UNSPECIFIED FRACTURE MORPHOLOGY, UNSPECIFIED PORTION OF FEMUR, SUBSEQUENT ENCOUNTER: Primary | ICD-10-CM

## 2025-09-01 RX ORDER — METHOCARBAMOL 750 MG/1
750 TABLET, FILM COATED ORAL 4 TIMES DAILY PRN
Qty: 44 TABLET | Refills: 3 | Status: SHIPPED | OUTPATIENT
Start: 2025-09-01

## 2025-09-01 RX ORDER — OXYCODONE HYDROCHLORIDE 5 MG/1
5 TABLET ORAL EVERY 4 HOURS PRN
Qty: 22 TABLET | Refills: 0 | Status: SHIPPED | OUTPATIENT
Start: 2025-09-01

## 2025-09-01 RX ORDER — TRAMADOL HYDROCHLORIDE 50 MG/1
50 TABLET, FILM COATED ORAL EVERY 4 HOURS PRN
Qty: 44 TABLET | Refills: 0 | Status: SHIPPED | OUTPATIENT
Start: 2025-09-01

## 2025-09-05 DIAGNOSIS — M25.559 HIP PAIN, UNSPECIFIED LATERALITY: Primary | ICD-10-CM

## (undated) DEVICE — COVER PROXIMA MAYO STAND

## (undated) DEVICE — ALCOHOL 70% ISOP RUBBING 4OZ

## (undated) DEVICE — YANKAUER OPEN TIP W/O VENT

## (undated) DEVICE — STAPLER SKIN ROTATING HEAD

## (undated) DEVICE — MANIFOLD 4 PORT

## (undated) DEVICE — BANDAGE MATRIX HK LOOP 6IN 5YD

## (undated) DEVICE — IMPLANTABLE DEVICE
Type: IMPLANTABLE DEVICE | Site: HIP | Status: NON-FUNCTIONAL
Removed: 2022-11-06

## (undated) DEVICE — DRESSING AQUACEL AG SILVER 4X4

## (undated) DEVICE — SLEEVE SCD EXPRESS KNEE MEDIUM

## (undated) DEVICE — ELECTRODE REM PLYHSV RETURN 9

## (undated) DEVICE — DRAPE THREE-QTR REINF 53X77IN

## (undated) DEVICE — DRESSING AQUACEL AG FOAM 4X4

## (undated) DEVICE — UNDERGLOVES BIOGEL PI SIZE 7.5

## (undated) DEVICE — GLOVE SURG ULTRA TOUCH 8

## (undated) DEVICE — BIT DRILL CANN TAPERED 10MM

## (undated) DEVICE — BIT DRILL QCK-CPLG 4.2MM

## (undated) DEVICE — SUT 2-0 VICRYL / CT-1

## (undated) DEVICE — ADHESIVE DERMABOND ADVANCED

## (undated) DEVICE — REAMER STEPPED DHS DCS

## (undated) DEVICE — APPLICATOR CHLORAPREP ORN 26ML

## (undated) DEVICE — PACK CUSTOM UNIV BASIN SLI

## (undated) DEVICE — PADDING WYTEX UNDRCST 6INX4YD

## (undated) DEVICE — DRESSING N ADH OIL EMUL 3X3

## (undated) DEVICE — SOL 9P NACL IRR PIC IL

## (undated) DEVICE — DRAPE IOBAN 2 STERI

## (undated) DEVICE — GOWN POLY REINF X-LONG XL

## (undated) DEVICE — DRAPE C ARM 42 X 120 10/BX

## (undated) DEVICE — SUT 0 VICRYL / CT-1

## (undated) DEVICE — DRESSING ABSRBNT ISLAND 3.6X8

## (undated) DEVICE — SUT MONOCRYL 3-0 PS-1

## (undated) DEVICE — WIRE GUIDE 3.2MM 400MM
Type: IMPLANTABLE DEVICE | Site: HIP | Status: NON-FUNCTIONAL
Removed: 2022-11-06

## (undated) DEVICE — TOWEL OR DISP STRL BLUE 4/PK

## (undated) DEVICE — PAD PERI POST REPLACEMNT

## (undated) DEVICE — STRAP OR TABLE 5IN X 72IN

## (undated) DEVICE — Device

## (undated) DEVICE — SPONGE BULKEE II ABSRB 6X6.75

## (undated) DEVICE — BNDG COFLEX FOAM LF2 ST 6X5YD

## (undated) DEVICE — GOWN POLY REINF BRTH SLV XL